# Patient Record
Sex: MALE | Race: WHITE | NOT HISPANIC OR LATINO | URBAN - METROPOLITAN AREA
[De-identification: names, ages, dates, MRNs, and addresses within clinical notes are randomized per-mention and may not be internally consistent; named-entity substitution may affect disease eponyms.]

---

## 2017-09-23 ENCOUNTER — GENERIC CONVERSION - ENCOUNTER (OUTPATIENT)
Dept: OTHER | Facility: OTHER | Age: 57
End: 2017-09-23

## 2017-10-04 ENCOUNTER — GENERIC CONVERSION - ENCOUNTER (OUTPATIENT)
Dept: OTHER | Facility: OTHER | Age: 57
End: 2017-10-04

## 2018-08-14 ENCOUNTER — OFFICE VISIT (OUTPATIENT)
Dept: FAMILY MEDICINE CLINIC | Facility: CLINIC | Age: 58
End: 2018-08-14
Payer: MEDICARE

## 2018-08-14 VITALS
SYSTOLIC BLOOD PRESSURE: 124 MMHG | HEIGHT: 69 IN | DIASTOLIC BLOOD PRESSURE: 82 MMHG | WEIGHT: 231 LBS | BODY MASS INDEX: 34.21 KG/M2 | HEART RATE: 68 BPM | TEMPERATURE: 98.5 F

## 2018-08-14 DIAGNOSIS — F20.9 SCHIZOPHRENIA, UNSPECIFIED TYPE (HCC): ICD-10-CM

## 2018-08-14 DIAGNOSIS — E78.2 MIXED HYPERLIPIDEMIA: ICD-10-CM

## 2018-08-14 DIAGNOSIS — N40.0 BENIGN PROSTATIC HYPERPLASIA, UNSPECIFIED WHETHER LOWER URINARY TRACT SYMPTOMS PRESENT: ICD-10-CM

## 2018-08-14 DIAGNOSIS — Z00.00 HEALTH MAINTENANCE EXAMINATION: Primary | ICD-10-CM

## 2018-08-14 DIAGNOSIS — Z12.11 SCREENING FOR COLON CANCER: ICD-10-CM

## 2018-08-14 DIAGNOSIS — G20 PARKINSONISM, UNSPECIFIED PARKINSONISM TYPE (HCC): ICD-10-CM

## 2018-08-14 DIAGNOSIS — Z12.5 SCREENING FOR PROSTATE CANCER: ICD-10-CM

## 2018-08-14 DIAGNOSIS — I10 ESSENTIAL HYPERTENSION: ICD-10-CM

## 2018-08-14 DIAGNOSIS — I48.21 PERMANENT ATRIAL FIBRILLATION (HCC): Primary | ICD-10-CM

## 2018-08-14 PROCEDURE — G0438 PPPS, INITIAL VISIT: HCPCS | Performed by: FAMILY MEDICINE

## 2018-08-14 RX ORDER — DIVALPROEX SODIUM 500 MG/1
500 TABLET, DELAYED RELEASE ORAL 2 TIMES DAILY
COMMUNITY

## 2018-08-14 RX ORDER — METOPROLOL TARTRATE 50 MG/1
1 TABLET, FILM COATED ORAL 3 TIMES DAILY
COMMUNITY
End: 2018-08-14 | Stop reason: DRUGHIGH

## 2018-08-14 RX ORDER — DIVALPROEX SODIUM 250 MG/1
250 TABLET, DELAYED RELEASE ORAL EVERY OTHER DAY
COMMUNITY

## 2018-08-14 RX ORDER — OLANZAPINE 10 MG/1
10 TABLET ORAL EVERY EVENING
COMMUNITY

## 2018-08-14 RX ORDER — GABAPENTIN 600 MG/1
1500 TABLET ORAL 2 TIMES DAILY
COMMUNITY
Start: 2014-11-26

## 2018-08-14 RX ORDER — FUROSEMIDE 20 MG/1
2 TABLET ORAL DAILY
COMMUNITY
End: 2021-08-10 | Stop reason: DRUGHIGH

## 2018-08-14 RX ORDER — METOPROLOL SUCCINATE 50 MG/1
50 TABLET, EXTENDED RELEASE ORAL 2 TIMES DAILY
COMMUNITY

## 2018-08-14 NOTE — PROGRESS NOTES
Assessment/Plan:  Health maintenance-the patient will go for the lab work and will follow up with the results when available  He will continue with his healthy diet and exercise  He will continue with his current medications  We will see him back in the office as scheduled  Diagnoses and all orders for this visit:    Health maintenance examination    Screening for colon cancer  -     Ambulatory Referral to GI Endoscopy; Future    Other orders  -     aspirin 81 MG tablet; Take 1 tablet by mouth daily  -     divalproex sodium (DEPAKOTE) 500 mg EC tablet; Take 500 mg by mouth 2 (two) times a day    -     gabapentin (NEURONTIN) 300 mg capsule; Take 600 mg by mouth 2 (two) times a day    -     furosemide (LASIX) 20 mg tablet; Take 3 tablets by mouth daily    -     Discontinue: metoprolol tartrate (LOPRESSOR) 50 mg tablet; Take 1 tablet by mouth 3 (three) times a day  -     OLANZapine (ZYPREXA) 10 mg tablet; Take 3 tablets by mouth every evening    -     divalproex sodium (DEPAKOTE) 250 mg EC tablet; Take 250 mg by mouth every other day  -     metoprolol succinate (TOPROL-XL) 50 mg 24 hr tablet; Take 50 mg by mouth 2 (two) times a day       Return in about 6 months (around 2/14/2019) for Recheck  Subjective:   Chief Complaint   Patient presents with    Physical Exam     Complete Physical 62 y/o male -- knows needs colonoscopy        Patient ID: Ajay Monteiro is a 61 y o  male presents today for a medicare wellness visit  Ajay Monteiro is a 61 y o  Male who presents for a complete physical    He is seeing the psychiatrist Dr Conchita Montano  He sees his cardiologist in [place]  There is occasional shortness of breath- he did address this with his cardiologist with heavy exertion  There is no severe dyspnea  He has not had a colonoscopy yet  He is interested in one  There are no GI or  problems  There is some straining with his bowel movements and he gets constipation at times      He is trying to drink water regularly  He is staying active  The following portions of the patient's history were reviewed and updated as appropriate: allergies, current medications, past family history, past medical history, past social history, past surgical history and problem list   Patient Active Problem List   Diagnosis    Atrial fibrillation ([facility] )    Benign prostatic hyperplasia    Cataract of both eyes    Hyperlipidemia    Hypertension    Obesity    Obstructive sleep apnea    Osteoarthritis    [facility]    Schizophrenia ([facility] )    Stasis dermatitis    Venous insufficiency     History reviewed  No pertinent past medical history  Past Surgical History:   Procedure Laterality Date    TOTAL HIP ARTHROPLASTY Left 08/2006    TOTAL KNEE ARTHROPLASTY Right 09/17/2011     Allergies   Allergen Reactions    Haloperidol      Family History   Problem Relation Age of Onset    Hypertension Mother     Stroke Family         syndrome      Social History     Social History    Marital status: /Civil Union     Spouse name: N/A    Number of children: N/A    Years of education: N/A     Occupational History    Not on file  Social History Main Topics    Smoking status: Never Smoker    Smokeless tobacco: Never Used    Alcohol use No    Drug use: No    Sexual activity: Not on file     Other Topics Concern    Not on file     Social History Narrative    Physical disability      No current outpatient prescriptions on file prior to visit  No current facility-administered medications on file prior to visit  Callum Scales is here for his Initial Wellness visit  Health Risk Assessment:  Patient rates overall health as good  Patient feels that their physical health rating is Same  Eyesight was rated as Same  Hearing was rated as Same  Patient feels that their emotional and mental health rating is Same  Pain experienced by patient in the last 7 days has been None       Emotional/Mental Health:  Patient has been feeling nervous/anxious  PHQ-9 Depression Screening:    Frequency of the following problems over the past two weeks:      1  Little interest or pleasure in doing things: 0 - not at all      2  Feeling down, depressed, or hopeless: 0 - not at all  PHQ-2 Score: 0          Broken Bones/Falls: Fall Risk Assessment:    In the past year, patient has experienced: No history of falling in past year          Bladder/Bowel:  Patient has not leaked urine accidently in the last six months  Patient reports no loss of bowel control  Immunizations:  Patient has had a flu vaccination within the last year  Patient has received a pneumonia shot  Patient has not received a shingles shot  Patient has received tetanus/diphtheria shot  Home Safety:  Patient does not have trouble with stairs inside or outside of their home  Patient currently reports that there are no safety hazards present in home, working smoke alarms,     Preventative Screenings:   prostate cancer screen performed, colon cancer screen completed, cholesterol screen completed, glaucoma eye exam completed,     Nutrition:  Current diet: Regular and Limited junk food with servings of the following:    Medications:  Patient is not currently taking any over-the-counter supplements  Patient is able to manage medications  Lifestyle Choices:  Patient reports no tobacco use  Patient has not smoked or used tobacco in the past   Patient reports no alcohol use  Patient drives a vehicle  Patient wears seat belt  Current level of exercise of physical activity described by patient as: walking daily          Activities of Daily Living:  Can get out of bed by his or her self, able to dress self, able to make own meals, able to do own shopping, able to bathe self, can do own laundry/housekeeping, can manage own money, pay bills and track expenses    Previous Hospitalizations:  No hospitalization or ED visit in past 12 months        Advanced Directives:  Patient has not decided on power of   Patient has not completed advanced directive  Review of Systems   Constitutional: Negative  HENT: Negative  Eyes: Negative  Respiratory: Negative  Cardiovascular: Negative  Gastrointestinal: Negative  Negative for bowel incontinence  Endocrine: Negative  Genitourinary: Negative  Musculoskeletal: Negative  Skin: Negative  Allergic/Immunologic: Negative  Neurological: Negative  Hematological: Negative  Psychiatric/Behavioral: The patient is nervous/anxious  Objective:  Vitals:    08/14/18 1506 08/14/18 1601   BP: 152/84 124/82   BP Location: Right arm    Patient Position: Sitting    Cuff Size: Standard    Pulse: 78 68   Temp: 98 5 °F (36 9 °C)    TempSrc: Tympanic    Weight: 105 kg (231 lb)    Height: 5' 9 3" (1 76 m)      Body mass index is 33 82 kg/m²  Wt Readings from Last 3 Encounters:   08/14/18 105 kg (231 lb)   05/13/14 105 kg (231 lb 8 oz)   11/12/13 109 kg (239 lb 8 oz)     Temp Readings from Last 3 Encounters:   08/14/18 98 5 °F (36 9 °C) (Tympanic)   07/18/12 98 6 °F (37 °C)     BP Readings from Last 3 Encounters:   08/14/18 124/82   05/13/14 120/80   11/12/13 120/80     Pulse Readings from Last 3 Encounters:   08/14/18 68   05/13/14 66   11/12/13 60        Physical Exam   Constitutional: He is oriented to person, place, and time  He appears well-developed and well-nourished  No distress  Eyes: EOM are normal  Pupils are equal, round, and reactive to light  Neck: Normal range of motion  Neck supple  No JVD present  No tracheal deviation present  No thyromegaly present  Cardiovascular: Normal rate, regular rhythm and normal heart sounds  Exam reveals no gallop and no friction rub  No murmur heard  Pulmonary/Chest: Effort normal and breath sounds normal  No stridor  No respiratory distress  He has no wheezes  He has no rales  He exhibits no tenderness  Abdominal: Soft  Bowel sounds are normal  He exhibits no distension and no mass  There is no tenderness  There is no rebound and no guarding  Musculoskeletal: Normal range of motion  Lymphadenopathy:     He has no cervical adenopathy  Neurological: He is alert and oriented to person, place, and time  He has normal reflexes  No cranial nerve deficit  He exhibits normal muscle tone  Coordination normal    Skin: He is not diaphoretic  Nursing note and vitals reviewed  No visits with results within 2 Month(s) from this visit     Latest known visit with results is:   Conversion Encounter Outpatient on 03/14/2015   Component Date Value    VALPROIC ACID TOTAL 03/14/2015 97     GLUCOSE FASTING 03/14/2015 92     Triglycerides 03/14/2015 168     Cholesterol 03/14/2015 157     HDL 03/14/2015 40     LDL Calculated 03/14/2015 83     Total Bilirubin 03/14/2015 0 53     Total Protein 03/14/2015 7 5     Alkaline Phosphatase 03/14/2015 53     ALT 03/14/2015 50     AST 03/14/2015 34     BILIRUBIN DIRECT 03/14/2015 0 15     Albumin 03/14/2015 3 9

## 2018-08-15 ENCOUNTER — TELEPHONE (OUTPATIENT)
Dept: FAMILY MEDICINE CLINIC | Facility: CLINIC | Age: 58
End: 2018-08-15

## 2018-08-15 DIAGNOSIS — F20.9 CHRONIC SCHIZOPHRENIA (HCC): Primary | ICD-10-CM

## 2018-08-16 ENCOUNTER — APPOINTMENT (OUTPATIENT)
Dept: LAB | Facility: CLINIC | Age: 58
End: 2018-08-16
Payer: MEDICARE

## 2018-08-16 DIAGNOSIS — Z12.5 SCREENING FOR PROSTATE CANCER: ICD-10-CM

## 2018-08-16 DIAGNOSIS — I10 ESSENTIAL HYPERTENSION: ICD-10-CM

## 2018-08-16 DIAGNOSIS — F20.9 SCHIZOPHRENIA, UNSPECIFIED TYPE (HCC): ICD-10-CM

## 2018-08-16 DIAGNOSIS — E78.2 MIXED HYPERLIPIDEMIA: ICD-10-CM

## 2018-08-16 DIAGNOSIS — N40.0 BENIGN PROSTATIC HYPERPLASIA, UNSPECIFIED WHETHER LOWER URINARY TRACT SYMPTOMS PRESENT: ICD-10-CM

## 2018-08-16 DIAGNOSIS — I48.21 PERMANENT ATRIAL FIBRILLATION (HCC): ICD-10-CM

## 2018-08-16 DIAGNOSIS — F20.9 CHRONIC SCHIZOPHRENIA (HCC): ICD-10-CM

## 2018-08-16 DIAGNOSIS — G20 PARKINSONISM, UNSPECIFIED PARKINSONISM TYPE (HCC): ICD-10-CM

## 2018-08-16 LAB
ALBUMIN SERPL BCP-MCNC: 3.9 G/DL (ref 3.5–5)
ALP SERPL-CCNC: 55 U/L (ref 46–116)
ALT SERPL W P-5'-P-CCNC: 48 U/L (ref 12–78)
ANION GAP SERPL CALCULATED.3IONS-SCNC: 9 MMOL/L (ref 4–13)
AST SERPL W P-5'-P-CCNC: 36 U/L (ref 5–45)
BASOPHILS # BLD AUTO: 0.05 THOUSANDS/ΜL (ref 0–0.1)
BASOPHILS NFR BLD AUTO: 1 % (ref 0–1)
BILIRUB SERPL-MCNC: 0.89 MG/DL (ref 0.2–1)
BILIRUB UR QL STRIP: NEGATIVE
BUN SERPL-MCNC: 14 MG/DL (ref 5–25)
CALCIUM SERPL-MCNC: 9 MG/DL (ref 8.3–10.1)
CHLORIDE SERPL-SCNC: 103 MMOL/L (ref 100–108)
CHOLEST SERPL-MCNC: 156 MG/DL (ref 50–200)
CLARITY UR: CLEAR
CO2 SERPL-SCNC: 28 MMOL/L (ref 21–32)
COLOR UR: YELLOW
CREAT SERPL-MCNC: 1.22 MG/DL (ref 0.6–1.3)
EOSINOPHIL # BLD AUTO: 0.24 THOUSAND/ΜL (ref 0–0.61)
EOSINOPHIL NFR BLD AUTO: 4 % (ref 0–6)
ERYTHROCYTE [DISTWIDTH] IN BLOOD BY AUTOMATED COUNT: 12.8 % (ref 11.6–15.1)
GFR SERPL CREATININE-BSD FRML MDRD: 65 ML/MIN/1.73SQ M
GLUCOSE P FAST SERPL-MCNC: 90 MG/DL (ref 65–99)
GLUCOSE UR STRIP-MCNC: NEGATIVE MG/DL
HCT VFR BLD AUTO: 41.6 % (ref 36.5–49.3)
HDLC SERPL-MCNC: 38 MG/DL (ref 40–60)
HGB BLD-MCNC: 13.7 G/DL (ref 12–17)
HGB UR QL STRIP.AUTO: NEGATIVE
IMM GRANULOCYTES # BLD AUTO: 0.02 THOUSAND/UL (ref 0–0.2)
IMM GRANULOCYTES NFR BLD AUTO: 0 % (ref 0–2)
KETONES UR STRIP-MCNC: NEGATIVE MG/DL
LDLC SERPL CALC-MCNC: 81 MG/DL (ref 0–100)
LDLC SERPL DIRECT ASSAY-MCNC: 100 MG/DL (ref 0–100)
LEUKOCYTE ESTERASE UR QL STRIP: NEGATIVE
LYMPHOCYTES # BLD AUTO: 1.92 THOUSANDS/ΜL (ref 0.6–4.47)
LYMPHOCYTES NFR BLD AUTO: 34 % (ref 14–44)
MCH RBC QN AUTO: 29.5 PG (ref 26.8–34.3)
MCHC RBC AUTO-ENTMCNC: 32.9 G/DL (ref 31.4–37.4)
MCV RBC AUTO: 90 FL (ref 82–98)
MONOCYTES # BLD AUTO: 0.62 THOUSAND/ΜL (ref 0.17–1.22)
MONOCYTES NFR BLD AUTO: 11 % (ref 4–12)
NEUTROPHILS # BLD AUTO: 2.88 THOUSANDS/ΜL (ref 1.85–7.62)
NEUTS SEG NFR BLD AUTO: 50 % (ref 43–75)
NITRITE UR QL STRIP: NEGATIVE
NONHDLC SERPL-MCNC: 118 MG/DL
NRBC BLD AUTO-RTO: 0 /100 WBCS
PH UR STRIP.AUTO: 6.5 [PH] (ref 4.5–8)
PLATELET # BLD AUTO: 223 THOUSANDS/UL (ref 149–390)
PMV BLD AUTO: 9.9 FL (ref 8.9–12.7)
POTASSIUM SERPL-SCNC: 3.9 MMOL/L (ref 3.5–5.3)
PROT SERPL-MCNC: 7.5 G/DL (ref 6.4–8.2)
PROT UR STRIP-MCNC: NEGATIVE MG/DL
RBC # BLD AUTO: 4.65 MILLION/UL (ref 3.88–5.62)
SODIUM SERPL-SCNC: 140 MMOL/L (ref 136–145)
SP GR UR STRIP.AUTO: 1.01 (ref 1–1.03)
T4 FREE SERPL-MCNC: 0.92 NG/DL (ref 0.76–1.46)
TRIGL SERPL-MCNC: 186 MG/DL
TSH SERPL DL<=0.05 MIU/L-ACNC: 6.08 UIU/ML (ref 0.36–3.74)
UROBILINOGEN UR QL STRIP.AUTO: 0.2 E.U./DL
VALPROATE SERPL-MCNC: 97 UG/ML (ref 50–100)
WBC # BLD AUTO: 5.73 THOUSAND/UL (ref 4.31–10.16)

## 2018-08-16 PROCEDURE — 83721 ASSAY OF BLOOD LIPOPROTEIN: CPT

## 2018-08-16 PROCEDURE — 80053 COMPREHEN METABOLIC PANEL: CPT

## 2018-08-16 PROCEDURE — 36415 COLL VENOUS BLD VENIPUNCTURE: CPT

## 2018-08-16 PROCEDURE — 84153 ASSAY OF PSA TOTAL: CPT

## 2018-08-16 PROCEDURE — 84439 ASSAY OF FREE THYROXINE: CPT

## 2018-08-16 PROCEDURE — 84443 ASSAY THYROID STIM HORMONE: CPT

## 2018-08-16 PROCEDURE — 80061 LIPID PANEL: CPT

## 2018-08-16 PROCEDURE — 81003 URINALYSIS AUTO W/O SCOPE: CPT

## 2018-08-16 PROCEDURE — 80164 ASSAY DIPROPYLACETIC ACD TOT: CPT

## 2018-08-16 PROCEDURE — 85025 COMPLETE CBC W/AUTO DIFF WBC: CPT

## 2018-08-17 DIAGNOSIS — R79.89 ABNORMAL THYROID BLOOD TEST: Primary | ICD-10-CM

## 2018-08-17 LAB — PSA SERPL DL<=0.01 NG/ML-MCNC: 2.05 NG/ML (ref 0–4)

## 2018-08-17 NOTE — PROGRESS NOTES
8/17/18 called and spoke in detail to patient about labs and to watch his sweet and carb intake   Also told patient we would like to repeat his tsh in 3 months

## 2018-08-17 NOTE — PROGRESS NOTES
8/17/18 called and tried to talk to patient   Patient was unavailable at this time will try again later

## 2018-08-22 ENCOUNTER — TELEPHONE (OUTPATIENT)
Dept: FAMILY MEDICINE CLINIC | Facility: CLINIC | Age: 58
End: 2018-08-22

## 2018-11-09 ENCOUNTER — TELEPHONE (OUTPATIENT)
Dept: FAMILY MEDICINE CLINIC | Facility: CLINIC | Age: 58
End: 2018-11-09

## 2018-11-09 DIAGNOSIS — R79.89 ELEVATED TSH: Primary | ICD-10-CM

## 2018-11-09 NOTE — TELEPHONE ENCOUNTER
Devorah Cheatham had an abnormal thyroid test 3 months ago and you wanted to repeat the test and he is asking for the paper work to have the testing done downstairs, if you would be so kind as to put it in the system he will get it done next week, thank you     [phone]

## 2018-11-13 ENCOUNTER — APPOINTMENT (OUTPATIENT)
Dept: LAB | Facility: CLINIC | Age: 58
End: 2018-11-13
Payer: MEDICARE

## 2018-11-13 DIAGNOSIS — R79.89 ELEVATED TSH: ICD-10-CM

## 2018-11-13 LAB
T4 FREE SERPL-MCNC: 0.95 NG/DL (ref 0.76–1.46)
TSH SERPL DL<=0.05 MIU/L-ACNC: 4.4 UIU/ML (ref 0.36–3.74)

## 2018-11-13 PROCEDURE — 36415 COLL VENOUS BLD VENIPUNCTURE: CPT

## 2018-11-13 PROCEDURE — 84443 ASSAY THYROID STIM HORMONE: CPT

## 2018-11-13 PROCEDURE — 84439 ASSAY OF FREE THYROXINE: CPT

## 2018-11-20 ENCOUNTER — TELEPHONE (OUTPATIENT)
Dept: FAMILY MEDICINE CLINIC | Facility: CLINIC | Age: 58
End: 2018-11-20

## 2018-11-21 NOTE — TELEPHONE ENCOUNTER
I spoke with the patient on 11/20/2018 and reviewed the results of his testing  His TSH was still mildly elevated but lower than his last value  His T4 free was in the normal range consistent with subclinical hypothyroidism  I advised him that no treatment is necessary at this time but will continue to monitor his thyroid levels every 6 months  He will continue with his current regimen and we will see him back as scheduled in March 2019 for checkup

## 2019-03-15 ENCOUNTER — OFFICE VISIT (OUTPATIENT)
Dept: FAMILY MEDICINE CLINIC | Facility: CLINIC | Age: 59
End: 2019-03-15
Payer: MEDICARE

## 2019-03-15 VITALS
HEART RATE: 67 BPM | WEIGHT: 231 LBS | DIASTOLIC BLOOD PRESSURE: 80 MMHG | BODY MASS INDEX: 34.21 KG/M2 | TEMPERATURE: 97.5 F | SYSTOLIC BLOOD PRESSURE: 138 MMHG | RESPIRATION RATE: 14 BRPM | OXYGEN SATURATION: 96 % | HEIGHT: 69 IN

## 2019-03-15 DIAGNOSIS — I48.21 PERMANENT ATRIAL FIBRILLATION (HCC): ICD-10-CM

## 2019-03-15 DIAGNOSIS — E03.8 SUBCLINICAL HYPOTHYROIDISM: Primary | ICD-10-CM

## 2019-03-15 DIAGNOSIS — Z23 NEED FOR VACCINATION: ICD-10-CM

## 2019-03-15 DIAGNOSIS — Z12.11 SCREENING FOR COLON CANCER: ICD-10-CM

## 2019-03-15 DIAGNOSIS — E66.9 OBESITY (BMI 30.0-34.9): ICD-10-CM

## 2019-03-15 DIAGNOSIS — F20.9 CHRONIC SCHIZOPHRENIA (HCC): ICD-10-CM

## 2019-03-15 DIAGNOSIS — G20 PARKINSONISM, UNSPECIFIED PARKINSONISM TYPE (HCC): ICD-10-CM

## 2019-03-15 DIAGNOSIS — E78.2 MIXED HYPERLIPIDEMIA: ICD-10-CM

## 2019-03-15 PROCEDURE — 99213 OFFICE O/P EST LOW 20 MIN: CPT | Performed by: FAMILY MEDICINE

## 2019-03-15 RX ORDER — OLANZAPINE 20 MG/1
TABLET ORAL
COMMUNITY
Start: 2018-12-21

## 2019-03-15 NOTE — PROGRESS NOTES
Assessment/Plan:  Subclinical hypothyroidism  The patient has a history of subclinical hypothyroidism-the patient will go for the follow-up lab work as ordered  We will follow up with him with the results  Hyperlipidemia  The patient will continue to work on his diet and exercise  We will see him back as scheduled  Atrial fibrillation [facility])  The patient will continue to follow up with his cardiologist as scheduled  The    Chronic schizophrenia [facility])  The patient is stable on his medication  He will continue to follow up with his psychiatrist as scheduled  Diagnoses and all orders for this visit:    Subclinical hypothyroidism  -     Comprehensive metabolic panel; Future  -     T4, free; Future  -     TSH, 3rd generation; Future    Mixed hyperlipidemia    Permanent atrial fibrillation (HCC)    Screening for colon cancer  -     Ambulatory referral to Gastroenterology; Future    [facility], unspecified [facility] )    Chronic schizophrenia ([facility] )  -     OLANZapine (ZyPREXA) 20 MG tablet; Once daily to take with a 10mg tab    Need for vaccination  -     Zoster Vac Recomb Adjuvanted ([address] [place] [address] [address]) 50 MCG/0 5ML SUSR; Inject 1 Dose into a muscle once for 1 dose    Obesity (BMI 30 0-34  9)       Return in about 5 months (around 8/15/2019) for Annual physical-physical      Subjective:   Chief Complaint   Patient presents with    Follow-up     recheck thyroid  Patient ID: Prudence Lindsey is a 60 y o  male presents today for a routine checkup  Prudence Lindsey is a 60 y o  male who presents today for follow-up of his subclinical hypothyroidism, hyperlipidemia, and other chronic medical conditions  He continues to see a psychiatrist regularly for management of his schizophrenia  He has a continues to see his cardiologist [facility] for management of his history atrial fibrillation S/P ablation  He does get some mild shortness of breath with exertion  There is non chest pain    There are no palpitations  There is no edema  He is seeing his cardiologist in May 2019 for follow up  There is no fatigue  There is some tiredness with the Zyprexa if he takes it too early  The patient denies any chest pain, shortness of breath, or palpitations  There is no edema  There are no headaches or visual changes  There is no lightheadedness, dizziness, or fainting spells  The patient currently denies any nausea, vomiting, or GERD symptoms  he has normal bowel movements and normal urine output  he has a normal appetite  The following portions of the patient's history were reviewed and updated as appropriate: allergies, current medications, past family history, past medical history, past social history, past surgical history and problem list   Patient Active Problem List   Diagnosis    Atrial fibrillation ([facility] )    Benign prostatic hyperplasia    Cataract of both eyes    Hyperlipidemia    Hypertension    Obesity    Obstructive sleep apnea    Osteoarthritis    Parkinsonism ([facility] )    Chronic schizophrenia ([facility] )    Stasis dermatitis    Venous insufficiency    Subclinical hypothyroidism     No past medical history on file    Past Surgical History:   Procedure Laterality Date    TOTAL HIP ARTHROPLASTY Left 08/2006    TOTAL KNEE ARTHROPLASTY Right 09/17/2011     Allergies   Allergen Reactions    Haloperidol      Family History   Problem Relation Age of Onset    Hypertension Mother     Stroke Family         syndrome      Social History     Socioeconomic History    Marital status: /Civil Union     Spouse name: Not on file    Number of children: Not on file    Years of education: Not on file    Highest education level: Not on file   Occupational History    Not on file   Social Needs    Financial resource strain: Not on file    Food insecurity:     Worry: Not on file     Inability: Not on file    Transportation needs:     Medical: Not on file     Non-medical: Not on file   Tobacco Use    Smoking status: Never Smoker    Smokeless tobacco: Never Used   Substance and Sexual Activity    Alcohol use: No    Drug use: No    Sexual activity: Not on file   Lifestyle    Physical activity:     Days per week: Not on file     Minutes per session: Not on file    Stress: Not on file   Relationships    Social connections:     Talks on phone: Not on file     Gets together: Not on file     Attends Confucianism service: Not on file     Active member of club or organization: Not on file     Attends meetings of clubs or organizations: Not on file     Relationship status: Not on file    Intimate partner violence:     Fear of current or ex partner: Not on file     Emotionally abused: Not on file     Physically abused: Not on file     Forced sexual activity: Not on file   Other Topics Concern    Not on file   Social History Narrative    Physical disability      Current Outpatient Medications on File Prior to Visit   Medication Sig Dispense Refill    aspirin 81 MG tablet Take 1 tablet by mouth daily      divalproex sodium (DEPAKOTE) 250 mg EC tablet Take 250 mg by mouth every other day      divalproex sodium (DEPAKOTE) 500 mg EC tablet Take 500 mg by mouth 2 (two) times a day        furosemide (LASIX) 20 mg tablet Take 2 tablets by mouth daily       gabapentin (NEURONTIN) 600 MG tablet Take 600 mg by mouth 2 (two) times a day        metoprolol succinate (TOPROL-XL) 50 mg 24 hr tablet Take 50 mg by mouth 2 (two) times a day      OLANZapine (ZYPREXA) 10 mg tablet Take 10 mg by mouth every evening       OLANZapine (ZyPREXA) 20 MG tablet Once daily to take with a 10mg tab       No current facility-administered medications on file prior to visit  Review of Systems   Constitutional: Negative  HENT: Negative  Eyes: Negative  Respiratory: Negative  Cardiovascular: Negative  Gastrointestinal: Negative  Endocrine: Negative  Genitourinary: Negative  Musculoskeletal: Negative      Skin: Negative  Allergic/Immunologic: Negative  Neurological: Negative  Hematological: Negative  Psychiatric/Behavioral: Negative  Objective:  Vitals:    03/15/19 1337   BP: 138/80   BP Location: Left arm   Patient Position: Sitting   Cuff Size: Standard   Pulse: 67   Resp: 14   Temp: 97 5 °F (36 4 °C)   TempSrc: Tympanic   SpO2: 96%   Weight: 105 kg (231 lb)   Height: 5' 9 3" (1 76 m)     Body mass index is 33 82 kg/m²  Wt Readings from Last 3 Encounters:   03/15/19 105 kg (231 lb)   08/14/18 105 kg (231 lb)   05/13/14 105 kg (231 lb 8 oz)     Temp Readings from Last 3 Encounters:   03/15/19 97 5 °F (36 4 °C) (Tympanic)   08/14/18 98 5 °F (36 9 °C) (Tympanic)   07/18/12 98 6 °F (37 °C)     BP Readings from Last 3 Encounters:   03/15/19 138/80   08/14/18 124/82   05/13/14 120/80     Pulse Readings from Last 3 Encounters:   03/15/19 67   08/14/18 68   05/13/14 66        Physical Exam   Constitutional: He is oriented to person, place, and time  He appears well-developed and well-nourished  No distress  Eyes: Pupils are equal, round, and reactive to light  EOM are normal    Neck: Normal range of motion  Neck supple  No JVD present  No tracheal deviation present  No thyromegaly present  Cardiovascular: Normal rate, regular rhythm and normal heart sounds  Exam reveals no gallop and no friction rub  No murmur heard  Pulmonary/Chest: Effort normal and breath sounds normal  No stridor  No respiratory distress  He has no wheezes  He has no rales  He exhibits no tenderness  Abdominal: Soft  Bowel sounds are normal  He exhibits no distension and no mass  There is no tenderness  There is no rebound and no guarding  Musculoskeletal: Normal range of motion  Lymphadenopathy:     He has no cervical adenopathy  Neurological: He is alert and oriented to person, place, and time  He has normal reflexes  He displays normal reflexes  No cranial nerve deficit  He exhibits normal muscle tone   Coordination normal    Skin: Skin is warm and dry  No rash noted  He is not diaphoretic  No erythema  No pallor  Nursing note and vitals reviewed  No visits with results within 2 Month(s) from this visit  Latest known visit with results is:   Appointment on 11/13/2018   Component Date Value    Free T4 11/13/2018 0 95     TSH 3RD GENERATON 11/13/2018 4 400*      BMI Counseling: Body mass index is 33 82 kg/m²  Discussed the patient's BMI with him  The BMI is above average  BMI counseling and education was provided to the patient  Nutrition recommendations include reducing portion sizes, decreasing overall calorie intake, 3-5 servings of fruits/vegetables daily, reducing fast food intake, moderation in carbohydrate intake, increasing intake of lean protein, reducing intake of saturated fat and trans fat and reducing intake of cholesterol  Exercise recommendations include exercising 3-5 times per week and strength training exercises

## 2019-03-17 NOTE — ASSESSMENT & PLAN NOTE
The patient has a history of subclinical hypothyroidism-the patient will go for the follow-up lab work as ordered  We will follow up with him with the results

## 2019-03-17 NOTE — ASSESSMENT & PLAN NOTE
The patient is stable on his medication  He will continue to follow up with his psychiatrist as scheduled

## 2019-03-18 ENCOUNTER — APPOINTMENT (OUTPATIENT)
Dept: LAB | Facility: CLINIC | Age: 59
End: 2019-03-18
Payer: MEDICARE

## 2019-03-18 DIAGNOSIS — E03.8 SUBCLINICAL HYPOTHYROIDISM: ICD-10-CM

## 2019-03-18 LAB
ALBUMIN SERPL BCP-MCNC: 3.8 G/DL (ref 3.5–5)
ALP SERPL-CCNC: 51 U/L (ref 46–116)
ALT SERPL W P-5'-P-CCNC: 31 U/L (ref 12–78)
ANION GAP SERPL CALCULATED.3IONS-SCNC: 4 MMOL/L (ref 4–13)
AST SERPL W P-5'-P-CCNC: 23 U/L (ref 5–45)
BILIRUB SERPL-MCNC: 0.55 MG/DL (ref 0.2–1)
BUN SERPL-MCNC: 18 MG/DL (ref 5–25)
CALCIUM SERPL-MCNC: 8.6 MG/DL (ref 8.3–10.1)
CHLORIDE SERPL-SCNC: 106 MMOL/L (ref 100–108)
CO2 SERPL-SCNC: 29 MMOL/L (ref 21–32)
CREAT SERPL-MCNC: 1.09 MG/DL (ref 0.6–1.3)
GFR SERPL CREATININE-BSD FRML MDRD: 74 ML/MIN/1.73SQ M
GLUCOSE SERPL-MCNC: 95 MG/DL (ref 65–140)
POTASSIUM SERPL-SCNC: 4.2 MMOL/L (ref 3.5–5.3)
PROT SERPL-MCNC: 7 G/DL (ref 6.4–8.2)
SODIUM SERPL-SCNC: 139 MMOL/L (ref 136–145)
T4 FREE SERPL-MCNC: 0.89 NG/DL (ref 0.76–1.46)
TSH SERPL DL<=0.05 MIU/L-ACNC: 5.34 UIU/ML (ref 0.36–3.74)

## 2019-03-18 PROCEDURE — 84439 ASSAY OF FREE THYROXINE: CPT

## 2019-03-18 PROCEDURE — 36415 COLL VENOUS BLD VENIPUNCTURE: CPT

## 2019-03-18 PROCEDURE — 80053 COMPREHEN METABOLIC PANEL: CPT

## 2019-03-18 PROCEDURE — 84443 ASSAY THYROID STIM HORMONE: CPT

## 2019-06-05 DIAGNOSIS — I48.21 PERMANENT ATRIAL FIBRILLATION (HCC): ICD-10-CM

## 2019-06-05 DIAGNOSIS — E66.9 OBESITY, UNSPECIFIED CLASSIFICATION, UNSPECIFIED OBESITY TYPE, UNSPECIFIED WHETHER SERIOUS COMORBIDITY PRESENT: ICD-10-CM

## 2019-06-05 DIAGNOSIS — E78.2 MIXED HYPERLIPIDEMIA: ICD-10-CM

## 2019-06-05 DIAGNOSIS — Z12.5 SCREENING FOR PROSTATE CANCER: ICD-10-CM

## 2019-06-05 DIAGNOSIS — Z11.4 SCREENING FOR HIV (HUMAN IMMUNODEFICIENCY VIRUS): ICD-10-CM

## 2019-06-05 DIAGNOSIS — Z00.00 HEALTHCARE MAINTENANCE: Primary | ICD-10-CM

## 2019-06-05 DIAGNOSIS — I10 ESSENTIAL HYPERTENSION: ICD-10-CM

## 2019-08-12 ENCOUNTER — APPOINTMENT (OUTPATIENT)
Dept: LAB | Facility: CLINIC | Age: 59
End: 2019-08-12
Payer: MEDICARE

## 2019-08-12 DIAGNOSIS — E66.9 OBESITY, UNSPECIFIED CLASSIFICATION, UNSPECIFIED OBESITY TYPE, UNSPECIFIED WHETHER SERIOUS COMORBIDITY PRESENT: ICD-10-CM

## 2019-08-12 DIAGNOSIS — I10 ESSENTIAL HYPERTENSION: ICD-10-CM

## 2019-08-12 DIAGNOSIS — Z12.5 SCREENING FOR PROSTATE CANCER: ICD-10-CM

## 2019-08-12 DIAGNOSIS — Z00.00 HEALTHCARE MAINTENANCE: ICD-10-CM

## 2019-08-12 DIAGNOSIS — E78.2 MIXED HYPERLIPIDEMIA: ICD-10-CM

## 2019-08-12 DIAGNOSIS — Z11.4 SCREENING FOR HIV (HUMAN IMMUNODEFICIENCY VIRUS): ICD-10-CM

## 2019-08-12 DIAGNOSIS — I48.21 PERMANENT ATRIAL FIBRILLATION (HCC): ICD-10-CM

## 2019-08-12 LAB
ALBUMIN SERPL BCP-MCNC: 3.8 G/DL (ref 3.5–5)
ALP SERPL-CCNC: 57 U/L (ref 46–116)
ALT SERPL W P-5'-P-CCNC: 42 U/L (ref 12–78)
ANION GAP SERPL CALCULATED.3IONS-SCNC: 7 MMOL/L (ref 4–13)
AST SERPL W P-5'-P-CCNC: 30 U/L (ref 5–45)
BACTERIA UR QL AUTO: ABNORMAL /HPF
BASOPHILS # BLD AUTO: 0.09 THOUSANDS/ΜL (ref 0–0.1)
BASOPHILS NFR BLD AUTO: 1 % (ref 0–1)
BILIRUB SERPL-MCNC: 0.74 MG/DL (ref 0.2–1)
BILIRUB UR QL STRIP: NEGATIVE
BUN SERPL-MCNC: 15 MG/DL (ref 5–25)
CALCIUM SERPL-MCNC: 8.8 MG/DL (ref 8.3–10.1)
CHLORIDE SERPL-SCNC: 102 MMOL/L (ref 100–108)
CHOLEST SERPL-MCNC: 155 MG/DL (ref 50–200)
CLARITY UR: CLEAR
CO2 SERPL-SCNC: 30 MMOL/L (ref 21–32)
COLOR UR: ABNORMAL
CREAT SERPL-MCNC: 1.23 MG/DL (ref 0.6–1.3)
EOSINOPHIL # BLD AUTO: 0.28 THOUSAND/ΜL (ref 0–0.61)
EOSINOPHIL NFR BLD AUTO: 4 % (ref 0–6)
ERYTHROCYTE [DISTWIDTH] IN BLOOD BY AUTOMATED COUNT: 12.9 % (ref 11.6–15.1)
GFR SERPL CREATININE-BSD FRML MDRD: 64 ML/MIN/1.73SQ M
GLUCOSE P FAST SERPL-MCNC: 95 MG/DL (ref 65–99)
GLUCOSE UR STRIP-MCNC: NEGATIVE MG/DL
HCT VFR BLD AUTO: 43.1 % (ref 36.5–49.3)
HDLC SERPL-MCNC: 34 MG/DL (ref 40–60)
HGB BLD-MCNC: 13.9 G/DL (ref 12–17)
HGB UR QL STRIP.AUTO: NEGATIVE
HYALINE CASTS #/AREA URNS LPF: ABNORMAL /LPF
IMM GRANULOCYTES # BLD AUTO: 0.03 THOUSAND/UL (ref 0–0.2)
IMM GRANULOCYTES NFR BLD AUTO: 0 % (ref 0–2)
KETONES UR STRIP-MCNC: NEGATIVE MG/DL
LDLC SERPL CALC-MCNC: 77 MG/DL (ref 0–100)
LEUKOCYTE ESTERASE UR QL STRIP: NEGATIVE
LYMPHOCYTES # BLD AUTO: 2.74 THOUSANDS/ΜL (ref 0.6–4.47)
LYMPHOCYTES NFR BLD AUTO: 37 % (ref 14–44)
MCH RBC QN AUTO: 28.9 PG (ref 26.8–34.3)
MCHC RBC AUTO-ENTMCNC: 32.3 G/DL (ref 31.4–37.4)
MCV RBC AUTO: 90 FL (ref 82–98)
MONOCYTES # BLD AUTO: 0.69 THOUSAND/ΜL (ref 0.17–1.22)
MONOCYTES NFR BLD AUTO: 9 % (ref 4–12)
NEUTROPHILS # BLD AUTO: 3.64 THOUSANDS/ΜL (ref 1.85–7.62)
NEUTS SEG NFR BLD AUTO: 49 % (ref 43–75)
NITRITE UR QL STRIP: NEGATIVE
NON-SQ EPI CELLS URNS QL MICRO: ABNORMAL /HPF
NRBC BLD AUTO-RTO: 0 /100 WBCS
PH UR STRIP.AUTO: 6.5 [PH]
PLATELET # BLD AUTO: 235 THOUSANDS/UL (ref 149–390)
PMV BLD AUTO: 10 FL (ref 8.9–12.7)
POTASSIUM SERPL-SCNC: 4 MMOL/L (ref 3.5–5.3)
PROT SERPL-MCNC: 7.3 G/DL (ref 6.4–8.2)
PROT UR STRIP-MCNC: ABNORMAL MG/DL
RBC # BLD AUTO: 4.81 MILLION/UL (ref 3.88–5.62)
RBC #/AREA URNS AUTO: ABNORMAL /HPF
SODIUM SERPL-SCNC: 139 MMOL/L (ref 136–145)
SP GR UR STRIP.AUTO: 1.02 (ref 1–1.03)
T4 FREE SERPL-MCNC: 0.87 NG/DL (ref 0.76–1.46)
TRIGL SERPL-MCNC: 222 MG/DL
TSH SERPL DL<=0.05 MIU/L-ACNC: 4.11 UIU/ML (ref 0.36–3.74)
UROBILINOGEN UR QL STRIP.AUTO: 0.2 E.U./DL
WBC # BLD AUTO: 7.47 THOUSAND/UL (ref 4.31–10.16)
WBC #/AREA URNS AUTO: ABNORMAL /HPF

## 2019-08-12 PROCEDURE — 80053 COMPREHEN METABOLIC PANEL: CPT

## 2019-08-12 PROCEDURE — G0103 PSA SCREENING: HCPCS

## 2019-08-12 PROCEDURE — 85025 COMPLETE CBC W/AUTO DIFF WBC: CPT

## 2019-08-12 PROCEDURE — 80061 LIPID PANEL: CPT

## 2019-08-12 PROCEDURE — 36415 COLL VENOUS BLD VENIPUNCTURE: CPT

## 2019-08-12 PROCEDURE — 84443 ASSAY THYROID STIM HORMONE: CPT

## 2019-08-12 PROCEDURE — 87389 HIV-1 AG W/HIV-1&-2 AB AG IA: CPT

## 2019-08-12 PROCEDURE — 84439 ASSAY OF FREE THYROXINE: CPT

## 2019-08-12 PROCEDURE — 81001 URINALYSIS AUTO W/SCOPE: CPT

## 2019-08-13 LAB
PSA FREE MFR SERPL: 21.8 %
PSA FREE SERPL-MCNC: 0.24 NG/ML
PSA SERPL-MCNC: 1.1 NG/ML (ref 0–4)

## 2019-08-14 LAB — HIV 1+2 AB+HIV1 P24 AG SERPL QL IA: NORMAL

## 2019-08-22 ENCOUNTER — OFFICE VISIT (OUTPATIENT)
Dept: FAMILY MEDICINE CLINIC | Facility: CLINIC | Age: 59
End: 2019-08-22
Payer: MEDICARE

## 2019-08-22 VITALS
SYSTOLIC BLOOD PRESSURE: 124 MMHG | TEMPERATURE: 97.3 F | BODY MASS INDEX: 34.51 KG/M2 | HEIGHT: 69 IN | DIASTOLIC BLOOD PRESSURE: 70 MMHG | RESPIRATION RATE: 18 BRPM | HEART RATE: 66 BPM | WEIGHT: 233 LBS

## 2019-08-22 DIAGNOSIS — Z23 NEED FOR VACCINATION: ICD-10-CM

## 2019-08-22 DIAGNOSIS — Z00.00 MEDICARE ANNUAL WELLNESS VISIT, INITIAL: Primary | ICD-10-CM

## 2019-08-22 PROCEDURE — G0439 PPPS, SUBSEQ VISIT: HCPCS | Performed by: FAMILY MEDICINE

## 2019-08-22 PROCEDURE — 90715 TDAP VACCINE 7 YRS/> IM: CPT | Performed by: FAMILY MEDICINE

## 2019-08-22 PROCEDURE — 90471 IMMUNIZATION ADMIN: CPT | Performed by: FAMILY MEDICINE

## 2019-08-22 NOTE — PROGRESS NOTES
Assessment and Plan:     Problem List Items Addressed This Visit     None      Visit Diagnoses     Medicare annual wellness visit, initial    -  Primary    Need for vaccination        Relevant Orders    TDAP VACCINE GREATER THAN OR EQUAL TO 6YO IM (Completed)       The patient had a normal exam today in the office  He is stable on his current medications  He will continue with his healthy diet and exercise  Will follow up with his specialists as scheduled  Will see him back in the office again 6 months  He was reminded to follow up for his flu shot in October or November  He is going to check with the pharmacy in regards to the Shingrix shingles vaccine  We will see him back as scheduled  Chief Complaint   Patient presents with   [facility] Wellness Visit     Initial Annual        History of Present Illness:     Patient presents for an initial Medicare wellness visit  Mary Grace Montano is a 60 y o  male who presents today for Medicare wellness visit  he   has been feeling well and has no complaints today  The patient denies any chest pain, shortness of breath, or palpitations  There is no edema  There are no headaches or visual changes  There is no lightheadedness, dizziness, or fainting spells  There are no GI symptoms  The patient goes for dental exams every 6 months and sees his eye doctor  The patient is watching his diet and follows a regular exercise program    He continues to see his Cardiologist and his Psychiatrist regularly  He exercises regularly  He is going to be scheduling a colonoscopy  Patient Care Team:  Dina Mondragon DO as PCP - General (Family Medicine)  Mayra Mckinley MD (Psychiatry)     Review of Systems:     Review of Systems   Constitutional: Negative  HENT: Negative  Eyes: Negative  Respiratory: Negative  Cardiovascular: Negative  Gastrointestinal: Negative  Negative for bowel incontinence  Endocrine: Negative  Genitourinary: Negative  Musculoskeletal: Negative  Skin: Negative  Allergic/Immunologic: Negative  Neurological: Negative  Hematological: Negative  Psychiatric/Behavioral: Negative  The patient is not nervous/anxious  Problem List:     Patient Active Problem List   Diagnosis    [facility] fibrillation [facility]    Benign prostatic hyperplasia    Cataract of both eyes    Hyperlipidemia    Hypertension    Obesity    Obstructive sleep apnea    Osteoarthritis    Parkinsonism [facility]    [facility]    Stasis dermatitis    Venous insufficiency    Subclinical hypothyroidism      Past Medical and Surgical History:     History reviewed  No pertinent past medical history    Past Surgical History:   Procedure Laterality Date    TOTAL HIP ARTHROPLASTY Left 08/2006    TOTAL KNEE ARTHROPLASTY Right 09/17/2011      Family History:     Family History   Problem Relation Age of Onset    Hypertension Mother     Stroke Family         syndrome       Social History:     Social History     Tobacco Use   Smoking Status Never Smoker   Smokeless Tobacco Never Used     Social History     Substance and Sexual Activity   Alcohol Use No     Social History     Substance and Sexual Activity   Drug Use No      Medications and Allergies:     Current Outpatient Medications   Medication Sig Dispense Refill    aspirin 81 MG tablet Take 1 tablet by mouth daily      divalproex sodium (DEPAKOTE) 250 mg EC tablet Take 250 mg by mouth every other day      divalproex sodium (DEPAKOTE) 500 mg EC tablet Take 500 mg by mouth 2 (two) times a day        furosemide (LASIX) 20 mg tablet Take 2 tablets by mouth daily       gabapentin (NEURONTIN) 600 MG tablet Take 600 mg by mouth 2 (two) times a day        metoprolol succinate (TOPROL-XL) 50 mg 24 hr tablet Take 50 mg by mouth 2 (two) times a day      OLANZapine (ZYPREXA) 10 mg tablet Take 10 mg by mouth every evening       OLANZapine (ZyPREXA) 20 MG tablet Once daily to take with a 10mg tab       No current facility-administered medications for this visit  Allergies   Allergen Reactions    Haloperidol      Neuroleptic Malignant Syndrome  Immunizations:     Immunization History   Administered Date(s) Administered    INFLUENZA 11/02/2013, 11/04/2016, 09/23/2017, 10/13/2018    Influenza TIV (IM) 1960, 11/10/2012, 11/04/2016, 09/23/2017    Pneumococcal Polysaccharide PPV23 11/12/2013    Td (adult), adsorbed 1959    Tdap 08/22/2019      Medicare Screening Tests and Risk Assessments:     Chauncey Limon is here for his Initial Wellness visit  Last Medicare Wellness visit information reviewed, patient interviewed and updates made to the record today  Health Risk Assessment:  Patient rates overall health as good  Patient feels that their physical health rating is Same  Eyesight was rated as Same  Hearing was rated as Same  Patient feels that their emotional and mental health rating is Same  Pain experienced by patient in the last 7 days has been None  Emotional/Mental Health:  Patient has not been feeling nervous/anxious  PHQ-9 Depression Screening:    Frequency of the following problems over the past two weeks:      1  Little interest or pleasure in doing things: 0 - not at all      2  Feeling down, depressed, or hopeless: 0 - not at all  PHQ-2 Score: 0          Broken Bones/Falls: Fall Risk Assessment:    In the past year, patient has experienced: No history of falling in past year          Bladder/Bowel:  Patient has not leaked urine accidently in the last six months  Patient reports no loss of bowel control  Immunizations:  Patient has had a flu vaccination within the last year  Patient has received a pneumonia shot  Patient has not received a shingles shot  Patient has not received tetanus/diphtheria shot   (Additional Comments: He is looking into the shingles vaccine   )    Home Safety:  Patient does not have trouble with stairs inside or outside of their home  Patient currently reports that there are no safety hazards present in home, working smoke alarms, working carbon monoxide detectors  Preventative Screenings:   prostate cancer screen performed, no colon cancer screen completed, cholesterol screen completed, glaucoma eye exam completed,     Nutrition:  Current diet: Regular and Limited junk food with servings of the following:    Medications:  Patient is not currently taking any over-the-counter supplements  Patient is able to manage medications  Lifestyle Choices:  Patient reports no tobacco use  Patient has not smoked or used tobacco in the past   Patient reports no alcohol use  Patient drives a vehicle  Patient wears seat belt  Current level of exercise of physical activity described by patient as: He exercises regularly           Activities of Daily Living:  Can get out of bed by his or her self, able to dress self, able to make own meals, able to do own shopping, able to bathe self, can do own laundry/housekeeping, can manage own money, pay bills and track expenses    Previous Hospitalizations:  No hospitalization or ED visit in past 12 months        Advanced Directives:  Patient has not decided on power of   Patient has not completed advanced directive          Preventative Screening/Counseling:      Cardiovascular:      General: Risks and Benefits Discussed and Screening Current      Counseling: Healthy Diet and Healthy Weight          Diabetes:      General: Risks and Benefits Discussed and Screening Current      Counseling: Healthy Diet and Healthy Weight          Colorectal Cancer:      General: Risks and Benefits Discussed      Due for studies: Colonoscopy - Low Risk          Prostate Cancer:      General: Risks and Benefits Discussed and Screening Current          Osteoporosis:      General: Risks and Benefits Discussed and Screening Current          AAA:      General: Screening Not Indicated          Glaucoma: General: Risks and Benefits Discussed and Screening Current          HIV:      General: Screening Not Indicated          Hepatitis C:      General: Screening Not Indicated        Advanced Directives:   Patient has no living will for healthcare, does not have durable POA for healthcare, patient does not have an advanced directive  Information on ACP and/or AD provided  No 5 wishes given  End of life assessment reviewed with patient  Provider agrees with end of life decisions   Immunizations:  Patient reviewed and up to date           Visual Acuity Screening    Right eye Left eye Both eyes   Without correction: 20/40 20/40 20/40   With correction:           Physical Exam:     /70 (BP Location: Left arm, Patient Position: Sitting, Cuff Size: Standard)   Pulse 66   Temp (!) 97 3 °F (36 3 °C) (Tympanic)   Resp 18   Ht 5' 9 3" (1 76 m)   Wt 106 kg (233 lb)   BMI 34 11 kg/m²     Physical Exam   Constitutional: He is oriented to person, place, and time  He appears well-developed and well-nourished  No distress  Eyes: Pupils are equal, round, and reactive to light  EOM are normal    Neck: Normal range of motion  Neck supple  No JVD present  No tracheal deviation present  No thyromegaly present  Cardiovascular: Normal rate, regular rhythm and normal heart sounds  Exam reveals no gallop and no friction rub  No murmur heard  Pulmonary/Chest: Effort normal and breath sounds normal  No stridor  No respiratory distress  He has no wheezes  He has no rales  He exhibits no tenderness  Abdominal: Soft  Bowel sounds are normal  He exhibits no distension and no mass  There is no tenderness  There is no rebound and no guarding  Musculoskeletal: Normal range of motion  Lymphadenopathy:     He has no cervical adenopathy  Neurological: He is alert and oriented to person, place, and time  He has normal reflexes  He displays normal reflexes  No cranial nerve deficit  He exhibits normal muscle tone   Coordination normal    Skin: Skin is warm and dry  No rash noted  He is not diaphoretic  No erythema  No pallor  Nursing note and vitals reviewed        Appointment on 08/12/2019   Component Date Value    WBC 08/12/2019 7 47     RBC 08/12/2019 4 81     Hemoglobin 08/12/2019 13 9     Hematocrit 08/12/2019 43 1     MCV 08/12/2019 90     MCH 08/12/2019 28 9     MCHC 08/12/2019 32 3     RDW 08/12/2019 12 9     MPV 08/12/2019 10 0     Platelets 94/12/3781 235     nRBC 08/12/2019 0     Neutrophils Relative 08/12/2019 49     Immat GRANS % 08/12/2019 0     Lymphocytes Relative 08/12/2019 37     Monocytes Relative 08/12/2019 9     Eosinophils Relative 08/12/2019 4     Basophils Relative 08/12/2019 1     Neutrophils Absolute 08/12/2019 3 64     Immature Grans Absolute 08/12/2019 0 03     Lymphocytes Absolute 08/12/2019 2 74     Monocytes Absolute 08/12/2019 0 69     Eosinophils Absolute 08/12/2019 0 28     Basophils Absolute 08/12/2019 0 09     Sodium 08/12/2019 139     Potassium 08/12/2019 4 0     Chloride 08/12/2019 102     CO2 08/12/2019 30     ANION GAP 08/12/2019 7     BUN 08/12/2019 15     Creatinine 08/12/2019 1 23     Glucose, Fasting 08/12/2019 95     Calcium 08/12/2019 8 8     AST 08/12/2019 30     ALT 08/12/2019 42     Alkaline Phosphatase 08/12/2019 57     Total Protein 08/12/2019 7 3     Albumin 08/12/2019 3 8     Total Bilirubin 08/12/2019 0 74     eGFR 08/12/2019 64     Cholesterol 08/12/2019 155     Triglycerides 08/12/2019 222*    HDL, Direct 08/12/2019 34*    LDL Calculated 08/12/2019 77     TSH 3RD GENERATON 08/12/2019 4 110*    Color, UA 08/12/2019 Dk Yellow     Clarity, UA 08/12/2019 Clear     Specific Gravity, UA 08/12/2019 1 024     pH, UA 08/12/2019 6 5     Leukocytes, UA 08/12/2019 Negative     Nitrite, UA 08/12/2019 Negative     Protein, UA 08/12/2019 Trace*    Glucose, UA 08/12/2019 Negative     Ketones, UA 08/12/2019 Negative     Urobilinogen, UA 08/12/2019 0  2     Bilirubin, UA 08/12/2019 Negative     Blood, UA 08/12/2019 Negative     Prostate Specific Antige* 08/12/2019 1 1     PSA, Free 08/12/2019 0 24     PSA, Free Pct 08/12/2019 21 8     HIV-1/HIV-2 Ab 08/12/2019 Non-Reactive     RBC, UA 08/12/2019 4-10*    WBC, UA 08/12/2019 None Seen     Epithelial Cells 08/12/2019 None Seen     Bacteria, UA 08/12/2019 None Seen     Hyaline Casts, UA 08/12/2019 None Seen     Free T4 08/12/2019 0 87

## 2019-08-22 NOTE — PATIENT INSTRUCTIONS
Obesity   AMBULATORY CARE:   Obesity  is when your body mass index (BMI) is greater than 30  Your healthcare provider will use your height and weight to measure your BMI  The risks of obesity include  many health problems, such as injuries or physical disability  You may need tests to check for the following:  · Diabetes     · High blood pressure or high cholesterol     · Heart disease     · Gallbladder or liver disease     · Cancer of the colon, breast, prostate, liver, or kidney     · Sleep apnea     · Arthritis or gout  Seek care immediately if:   · You have a severe headache, confusion, or difficulty speaking  · You have weakness on one side of your body  · You have chest pain, sweating, or shortness of breath  Contact your healthcare provider if:   · You have symptoms of gallbladder or liver disease, such as pain in your upper abdomen  · You have knee or hip pain and discomfort while walking  · You have symptoms of diabetes, such as intense hunger and thirst, and frequent urination  · You have symptoms of sleep apnea, such as snoring or daytime sleepiness  · You have questions or concerns about your condition or care  Treatment for obesity  focuses on helping you lose weight to improve your health  Even a small decrease in BMI can reduce the risk for many health problems  Your healthcare provider will help you set a weight-loss goal   · Lifestyle changes  are the first step in treating obesity  These include making healthy food choices and getting regular physical activity  Your healthcare provider may suggest a weight-loss program that involves coaching, education, and therapy  · Medicine  may help you lose weight when it is used with a healthy diet and physical activity  · Surgery  can help you lose weight if you are very obese and have other health problems  There are several types of weight-loss surgery  Ask your healthcare provider for more information    Be successful losing weight:   · Set small, realistic goals  An example of a small goal is to walk for 20 minutes 5 days a week  Anther goal is to lose 5% of your body weight  · Tell friends, family members, and coworkers about your goals  and ask for their support  Ask a friend to lose weight with you, or join a weight-loss support group  · Identify foods or triggers that may cause you to overeat , and find ways to avoid them  Remove tempting high-calorie foods from your home and workplace  Place a bowl of fresh fruit on your kitchen counter  If stress causes you to eat, then find other ways to cope with stress  · Keep a diary to track what you eat and drink  Also write down how many minutes of physical activity you do each day  Weigh yourself once a week and record it in your diary  Eating changes: You will need to eat 500 to 1,000 fewer calories each day than you currently eat to lose 1 to 2 pounds a week  The following changes will help you cut calories:  · Eat smaller portions  Use small plates, no larger than 9 inches in diameter  Fill your plate half full of fruits and vegetables  Measure your food using measuring cups until you know what a serving size looks like  · Eat 3 meals and 1 or 2 snacks each day  Plan your meals in advance  Zonia Stalls and eat at home most of the time  Eat slowly  · Eat fruits and vegetables at every meal   They are low in calories and high in fiber, which makes you feel full  Do not add butter, margarine, or cream sauce to vegetables  Use herbs to season steamed vegetables  · Eat less fat and fewer fried foods  Eat more baked or grilled chicken and fish  These protein sources are lower in calories and fat than red meat  Limit fast food  Dress your salads with olive oil and vinegar instead of bottled dressing  · Limit the amount of sugar you eat  Do not drink sugary beverages  Limit alcohol  Activity changes:  Physical activity is good for your body in many ways   It helps you burn calories and build strong muscles  It decreases stress and depression, and improves your mood  It can also help you sleep better  Talk to your healthcare provider before you begin an exercise program   · Exercise for at least 30 minutes 5 days a week  Start slowly  Set aside time each day for physical activity that you enjoy and that is convenient for you  It is best to do both weight training and an activity that increases your heart rate, such as walking, bicycling, or swimming  · Find ways to be more active  Do yard work and housecleaning  Walk up the stairs instead of using elevators  Spend your leisure time going to events that require walking, such as outdoor festivals or fairs  This extra physical activity can help you lose weight and keep it off  Follow up with your healthcare provider as directed: You may need to meet with a dietitian  Write down your questions so you remember to ask them during your visits  © 2017 [address] [employer] Information is for End User's use only and may not be sold, redistributed or otherwise used for commercial purposes  All illustrations and images included in CareNotes® are the copyrighted property of [employer]  or Feroz Hernandez  The above information is an  only  It is not intended as medical advice for individual conditions or treatments  Talk to your doctor, nurse or pharmacist before following any medical regimen to see if it is safe and effective for you  Urinary Incontinence   WHAT YOU NEED TO KNOW:   What is urinary incontinence? Urinary incontinence (UI) is when you lose control of your bladder  What causes UI? UI occurs because your bladder cannot store or empty urine properly  The following are the most common types of UI:  · Stress incontinence  is when you leak urine due to increased bladder pressure  This may happen when you cough, sneeze, or exercise       · Urge incontinence  is when you feel the need to urinate right away and leak urine accidentally  · Mixed incontinence  is when you have both stress and urge UI  What are the signs and symptoms of UI?   · You feel like your bladder does not empty completely when you urinate  · You urinate often and need to urinate immediately  · You leak urine when you sleep, or you wake up with the urge to urinate  · You leak urine when you cough, sneeze, exercise, or laugh  How is UI diagnosed? Your healthcare provider will ask how often you leak urine and whether you have stress or urge symptoms  Tell him which medicines you take, how often you urinate, and how much liquid you drink each day  You may need any of the following tests:  · Urine tests  may show infection or kidney function  · A pelvic exam  may be done to check for blockages  A pelvic exam will also show if your bladder, uterus, or other organs have moved out of place  · An x-ray, ultrasound, or CT  may show problems with parts of your urinary system  You may be given contrast liquid to help your organs show up better in the pictures  Tell the healthcare provider if you have ever had an allergic reaction to contrast liquid  Do not enter the MRI room with anything metal  Metal can cause serious injury  Tell the healthcare provider if you have any metal in or on your body  · A bladder scan  will show how much urine is left in your bladder after you urinate  You will be asked to urinate and then healthcare providers will use a small ultrasound machine to check the urine left in your bladder  · Cystometry  is used to check the function of your urinary system  Your healthcare provider checks the pressure in your bladder while filling it with fluid  Your bladder pressure may also be tested when your bladder is full and while you urinate  How is UI treated? · Medicines  can help strengthen your bladder control      · Electrical stimulation  is used to send a small amount of electrical energy to your pelvic floor muscles  This helps control your bladder function  Electrodes may be placed outside your body or in your rectum  For women, the electrodes may be placed in the vagina  · A bulking agent  may be injected into the wall of your urethra to make it thicker  This helps keep your urethra closed and decreases urine leakage  · Devices  such as a clamp, pessary, or tampon may help stop urine leaks  Ask your healthcare provider for more information about these and other devices  · Surgery  may be needed if other treatments do not work  Several types of surgery can help improve your bladder control  Ask your healthcare provider for more information about the surgery you may need  How can I manage my symptoms? · Do pelvic muscle exercises often  Your pelvic muscles help you stop urinating  Squeeze these muscles tight for 5 seconds, then relax for 5 seconds  Gradually work up to squeezing for 10 seconds  Do 3 sets of 15 repetitions a day, or as directed  This will help strengthen your pelvic muscles and improve bladder control  · A catheter  may be used to help empty your bladder  A catheter is a tiny, plastic tube that is put into your bladder to drain your urine  Your healthcare provider may tell you to use a catheter to prevent your bladder from getting too full and leaking urine  · Keep a UI record  Write down how often you leak urine and how much you leak  Make a note of what you were doing when you leaked urine  · Train your bladder  Go to the bathroom at set times, such as every 2 hours, even if you do not feel the urge to go  You can also try to hold your urine when you feel the urge to go  For example, hold your urine for 5 minutes when you feel the urge to go  As that becomes easier, hold your urine for 10 minutes  · Drink liquids as directed  Ask your healthcare provider how much liquid to drink each day and which liquids are best for you   You may need to limit the amount of liquid you drink to help control your urine leakage  Limit or do not have drinks that contain caffeine or alcohol  Do not drink any liquid right before you go to bed  · Prevent constipation  Eat a variety of high-fiber foods  Good examples are high-fiber cereals, beans, vegetables, and whole-grain breads  Prune juice may help make your bowel movement softer  Walking is the best way to trigger your intestines to have a bowel movement  · Exercise regularly and maintain a healthy weight  Ask your healthcare provider how much you should weigh and about the best exercise plan for you  Weight loss and exercise will decrease pressure on your bladder and help you control your leakage  Ask him to help you create a weight loss plan if you are overweight  When should I seek immediate care? · You have severe pain  · You are confused or cannot think clearly  When should I contact my healthcare provider? · You have a fever  · You see blood in your urine  · You have pain when you urinate  · You have new or worse pain, even after treatment  · Your mouth feels dry or you have vision changes  · Your urine is cloudy or smells bad  · You have questions or concerns about your condition or care  CARE AGREEMENT:   You have the right to help plan your care  Learn about your health condition and how it may be treated  Discuss treatment options with your caregivers to decide what care you want to receive  You always have the right to refuse treatment  The above information is an  only  It is not intended as medical advice for individual conditions or treatments  Talk to your doctor, nurse or pharmacist before following any medical regimen to see if it is safe and effective for you  © 2017 [address] [employer] Information is for End User's use only and may not be sold, redistributed or otherwise used for commercial purposes   All illustrations and images included in CareNotes® are the copyrighted property of [employer] M [employer] Inc  or Feroz Hernandez  Cigarette Smoking and Your Health   AMBULATORY CARE:   Risks to your health if you smoke:  Nicotine and other chemicals found in tobacco damage every cell in your body  Even if you are a light smoker, you have an increased risk for cancer, heart disease, and lung disease  If you are pregnant or have diabetes, smoking increases your risk for complications  Benefits to your health if you stop smoking:   · You decrease respiratory symptoms such as coughing, wheezing, and shortness of breath  · You reduce your risk for cancers of the lung, mouth, throat, kidney, bladder, pancreas, stomach, and cervix  If you already have cancer, you increase the benefits of chemotherapy  You also reduce your risk for cancer returning or a second cancer from developing  · You reduce your risk for heart disease, blood clots, heart attack, and stroke  · You reduce your risk for lung infections, and diseases such as pneumonia, asthma, chronic bronchitis, and emphysema  · Your circulation improves  More oxygen can be delivered to your body  If you have diabetes, you lower your risk for complications, such as kidney, artery, and eye diseases  You also lower your risk for nerve damage  Nerve damage can lead to amputations, poor vision, and blindness  · You improve your body's ability to heal and to fight infections  Benefits to the health of others if you stop smoking:  Tobacco is harmful to nonsmokers who breathe in your secondhand smoke  The following are ways the health of others around you may improve when you stop smoking:  · You lower the risks for lung cancer and heart disease in nonsmoking adults  · If you are pregnant, you lower the risk for miscarriage, early delivery, low birth weight, and stillbirth  You also lower your baby's risk for SIDS, obesity, developmental delay, and neurobehavioral problems, such as ADHD  · If you have children, you lower their risk for ear infections, colds, pneumonia, bronchitis, and asthma  For more information and support to stop smoking:   · Smokefree  gov  Phone: 8- 029 - 172-4033  Web Address: www smokefrM[employer]  Follow up with your healthcare provider as directed:  Write down your questions so you remember to ask them during your visits  © 2017 [address] [employer] Information is for End User's use only and may not be sold, redistributed or otherwise used for commercial purposes  All illustrations and images included in CareNotes® are the copyrighted property of [employer] M [employer]  or Feroz Hernandez  The above information is an  only  It is not intended as medical advice for individual conditions or treatments  Talk to your doctor, nurse or pharmacist before following any medical regimen to see if it is safe and effective for you  Fall Prevention   WHAT YOU NEED TO KNOW:   What is fall prevention? Fall prevention includes ways to make your home and other areas safer  It also includes ways you can move more carefully to prevent a fall  What increases my risk for falls? · Lack of vitamin D    · Not getting enough sleep each night    · Trouble walking or keeping your balance, or foot problems    · Health conditions that cause changes in your blood pressure, vision, or muscle strength and coordination    · Medicines that make you dizzy, weak, or sleepy    · Problems seeing clearly    · Shoes that have high heels or are not supportive    · Tripping hazards, such as items left on the floor, no handrails on the stairs, or broken steps  How can I help protect myself from falls? · Stand or sit up slowly  This may help you keep your balance and prevent falls  If you need to get up during the night, sit up first  Be sure you are fully awake before you stand  Turn on the light before you start walking  Go slowly in case you are still sleepy   Make sure you will not trip over any pets sleeping in the bedroom  · Use assistive devices as directed  Your healthcare provider may suggest that you use a cane or walker to help you keep your balance  You may need to have grab bars put in your bathroom near the toilet or in the shower  · Wear shoes that fit well and have soles that   Wear shoes both inside and outside  Use slippers with good   Do not wear shoes with high heels  · Wear a personal alarm  This is a device that allows you to call 911 if you fall and need help  Ask your healthcare provider for more information  · Stay active  Exercise can help strengthen your muscles and improve your balance  Your healthcare provider may recommend water aerobics or walking  He or she may also recommend physical therapy to improve your coordination  Never start an exercise program without talking to your healthcare provider first      · Manage medical conditions  Keep all appointments with your healthcare providers  Visit your eye doctor as directed  How can I make my home safer? · Add items to prevent falls in the bathroom  Put nonslip strips on your bath or shower floor to prevent you from slipping  Use a bath mat if you do not have carpet in the bathroom  This will prevent you from falling when you step out of the bath or shower  Use a shower seat so you do not need to stand while you shower  Sit on the toilet or a chair in your bathroom to dry yourself and put on clothing  This will prevent you from losing your balance from drying or dressing yourself while you are standing  · Keep paths clear  Remove books, shoes, and other objects from walkways and stairs  Place cords for telephones and lamps out of the way so that you do not need to walk over them  Tape them down if you cannot move them  Remove small rugs  If you cannot remove a rug, secure it with double-sided tape  This will prevent you from tripping  · Install bright lights in your home  Use night lights to help light paths to the bathroom or kitchen  Always turn on the light before you start walking  · Keep items you use often on shelves within reach  Do not use a step stool to help you reach an item  · Paint or place reflective tape on the edges of your stairs  This will help you see the stairs better  Call 911 or have someone else call if:   · You have fallen and are unconscious  · You have fallen and cannot move part of your body  Contact your healthcare provider if:   · You have fallen and have pain or a headache  · You have questions or concerns about your condition or care  CARE AGREEMENT:   You have the right to help plan your care  Learn about your health condition and how it may be treated  Discuss treatment options with your caregivers to decide what care you want to receive  You always have the right to refuse treatment  The above information is an  only  It is not intended as medical advice for individual conditions or treatments  Talk to your doctor, nurse or pharmacist before following any medical regimen to see if it is safe and effective for you  © 2017 [address] [facility] Information is for End User's use only and may not be sold, redistributed or otherwise used for commercial purposes  All illustrations and images included in CareNotes® are the copyrighted property of [employer]  or Feroz Hernandez  Advance Directives   WHAT YOU NEED TO KNOW:   What are advance directives? Advance directives are legal documents that state your wishes and plans for medical care  These plans are made ahead of time in case you lose your ability to make decisions for yourself  Advance directives can apply to any medical decision, such as the treatments you want, and if you want to donate organs  What are the types of advance directives? There are many types of advance directives, and each state has rules about how to use them   You may choose a combination of any of the following:  · Living will: This is a written record of the treatment you want  You can also choose which treatments you do not want, which to limit, and which to stop at a certain time  This includes surgery, medicine, IV fluid, and tube feedings  · Durable power of  for healthcare [facility]): This is a written record that states who you want to make healthcare choices for you when you are unable to make them for yourself  This person, called a proxy, is usually a family member or a friend  You may choose more than 1 proxy  · Do not resuscitate (DNR) order:  A DNR order is used in case your heart stops beating or you stop breathing  It is a request not to have certain forms of treatment, such as CPR  A DNR order may be included in other types of advance directives  · Medical directive: This covers the care that you want if you are in a coma, near death, or unable to make decisions for yourself  You can list the treatments you want for each condition  Treatment may include pain medicine, surgery, blood transfusions, dialysis, IV or tube feedings, and a ventilator (breathing machine)  · Values history: This document has questions about your views, beliefs, and how you feel and think about life  This information can help others choose the care that you would choose  Why are advance directives important? An advance directive helps you control your care  Although spoken wishes may be used, it is better to have your wishes written down  Spoken wishes can be misunderstood, or not followed  Treatments may be given even if you do not want them  An advance directive may make it easier for your family to make difficult choices about your care  How do I decide what to put in my advance directives? · Make informed decisions:  Make sure you fully understand treatments or care you may receive   Think about the benefits and problems your decisions could cause for you or your family  Talk to healthcare providers if you have concerns or questions before you write down your wishes  You may also want to talk with your Jain or , or a   Check your state laws to make sure that what you put in your advance directive is legal      · Sign all forms:  Sign and date your advance directive when you have finished  You may also need 2 witnesses to sign the forms  Witnesses cannot be your doctor or his staff, your spouse, heirs or beneficiaries, people you owe money to, or your chosen proxy  Talk to your family, proxy, and healthcare providers about your advance directive  Give each person a copy, and keep one for yourself in a place you can get to easily  Do not keep it hidden or locked away  · Review and revise your plans: You can revise your advance directive at any time, as long as you are able to make decisions  Review your plan every year, and when there are changes in your life, or your health  When you make changes, let your family, proxy, and healthcare providers know  Give each a new copy  Where can I find more information? · [employer]  [address] , [address]  Phone: 9- 625 - 140-2014  Phone: 4- 946 - 852-6752  Web Address: http://www  aafp org  · [address] [facility])  [address] [facility], [address] [facility] , [address] [place] [address]  Phone: 5- [phone] - [phone]  Phone: [phone]  Web Address: Amy pedersen  [facility] AGREEMENT:   You have the right to help plan your care  To help with this plan, you must learn about your health condition and treatment options  You must also learn about advance directives and how they are used  Work with your healthcare providers to decide what care will be used to treat you  You always have the right to refuse treatment  The above information is an  only   It is not intended as medical advice for individual conditions or treatments  Talk to your doctor, nurse or pharmacist before following any medical regimen to see if it is safe and effective for you  © 2017 [address] [employer] Information is for End User's use only and may not be sold, redistributed or otherwise used for commercial purposes  All illustrations and images included in CareNotes® are the copyrighted property of A D A M , Inc  or Feroz Hernandez

## 2020-02-27 ENCOUNTER — APPOINTMENT (OUTPATIENT)
Dept: LAB | Facility: CLINIC | Age: 60
End: 2020-02-27
Payer: MEDICARE

## 2020-02-27 ENCOUNTER — OFFICE VISIT (OUTPATIENT)
Dept: FAMILY MEDICINE CLINIC | Facility: CLINIC | Age: 60
End: 2020-02-27
Payer: MEDICARE

## 2020-02-27 VITALS
SYSTOLIC BLOOD PRESSURE: 110 MMHG | DIASTOLIC BLOOD PRESSURE: 80 MMHG | WEIGHT: 233.6 LBS | HEART RATE: 77 BPM | TEMPERATURE: 98 F | OXYGEN SATURATION: 96 % | BODY MASS INDEX: 34.6 KG/M2 | HEIGHT: 69 IN

## 2020-02-27 DIAGNOSIS — E78.2 MIXED HYPERLIPIDEMIA: ICD-10-CM

## 2020-02-27 DIAGNOSIS — Z12.11 SCREENING FOR COLON CANCER: ICD-10-CM

## 2020-02-27 DIAGNOSIS — I10 ESSENTIAL HYPERTENSION: ICD-10-CM

## 2020-02-27 DIAGNOSIS — F20.9 CHRONIC SCHIZOPHRENIA (HCC): ICD-10-CM

## 2020-02-27 DIAGNOSIS — Z11.59 ENCOUNTER FOR HEPATITIS C SCREENING TEST FOR LOW RISK PATIENT: ICD-10-CM

## 2020-02-27 DIAGNOSIS — E03.8 SUBCLINICAL HYPOTHYROIDISM: ICD-10-CM

## 2020-02-27 DIAGNOSIS — I10 ESSENTIAL HYPERTENSION: Primary | ICD-10-CM

## 2020-02-27 DIAGNOSIS — I48.21 PERMANENT ATRIAL FIBRILLATION (HCC): ICD-10-CM

## 2020-02-27 LAB
ALBUMIN SERPL BCP-MCNC: 4.2 G/DL (ref 3.5–5)
ALP SERPL-CCNC: 61 U/L (ref 46–116)
ALT SERPL W P-5'-P-CCNC: 39 U/L (ref 12–78)
ANION GAP SERPL CALCULATED.3IONS-SCNC: 4 MMOL/L (ref 4–13)
AST SERPL W P-5'-P-CCNC: 25 U/L (ref 5–45)
BILIRUB SERPL-MCNC: 0.43 MG/DL (ref 0.2–1)
BUN SERPL-MCNC: 17 MG/DL (ref 5–25)
CALCIUM SERPL-MCNC: 8.7 MG/DL (ref 8.3–10.1)
CHLORIDE SERPL-SCNC: 107 MMOL/L (ref 100–108)
CHOLEST SERPL-MCNC: 144 MG/DL (ref 50–200)
CO2 SERPL-SCNC: 29 MMOL/L (ref 21–32)
CREAT SERPL-MCNC: 1.11 MG/DL (ref 0.6–1.3)
GFR SERPL CREATININE-BSD FRML MDRD: 72 ML/MIN/1.73SQ M
GLUCOSE P FAST SERPL-MCNC: 101 MG/DL (ref 65–99)
HCV AB SER QL: NORMAL
HDLC SERPL-MCNC: 37 MG/DL
LDLC SERPL CALC-MCNC: 69 MG/DL (ref 0–100)
LDLC SERPL DIRECT ASSAY-MCNC: 82 MG/DL (ref 0–100)
NONHDLC SERPL-MCNC: 107 MG/DL
POTASSIUM SERPL-SCNC: 4.3 MMOL/L (ref 3.5–5.3)
PROT SERPL-MCNC: 7.6 G/DL (ref 6.4–8.2)
SODIUM SERPL-SCNC: 140 MMOL/L (ref 136–145)
T4 FREE SERPL-MCNC: 0.99 NG/DL (ref 0.76–1.46)
TRIGL SERPL-MCNC: 191 MG/DL
TSH SERPL DL<=0.05 MIU/L-ACNC: 4.59 UIU/ML (ref 0.36–3.74)
VALPROATE SERPL-MCNC: 57 UG/ML (ref 50–100)

## 2020-02-27 PROCEDURE — 86803 HEPATITIS C AB TEST: CPT

## 2020-02-27 PROCEDURE — 3074F SYST BP LT 130 MM HG: CPT | Performed by: FAMILY MEDICINE

## 2020-02-27 PROCEDURE — 80164 ASSAY DIPROPYLACETIC ACD TOT: CPT

## 2020-02-27 PROCEDURE — 3008F BODY MASS INDEX DOCD: CPT | Performed by: FAMILY MEDICINE

## 2020-02-27 PROCEDURE — 80053 COMPREHEN METABOLIC PANEL: CPT

## 2020-02-27 PROCEDURE — 83721 ASSAY OF BLOOD LIPOPROTEIN: CPT

## 2020-02-27 PROCEDURE — 99214 OFFICE O/P EST MOD 30 MIN: CPT | Performed by: FAMILY MEDICINE

## 2020-02-27 PROCEDURE — 84439 ASSAY OF FREE THYROXINE: CPT

## 2020-02-27 PROCEDURE — 84443 ASSAY THYROID STIM HORMONE: CPT

## 2020-02-27 PROCEDURE — 3079F DIAST BP 80-89 MM HG: CPT | Performed by: FAMILY MEDICINE

## 2020-02-27 PROCEDURE — 1036F TOBACCO NON-USER: CPT | Performed by: FAMILY MEDICINE

## 2020-02-27 PROCEDURE — 80061 LIPID PANEL: CPT

## 2020-02-27 PROCEDURE — 36415 COLL VENOUS BLD VENIPUNCTURE: CPT

## 2020-02-27 NOTE — ASSESSMENT & PLAN NOTE
The patient will go for the up-to-date thyroid panel as ordered and will follow up with the results  We will have further instructions at that time

## 2020-02-27 NOTE — ASSESSMENT & PLAN NOTE
The patient will go for the up-to-date lipid panel as ordered  He will continue to work on improving his diet and exercise  We will see him back as scheduled

## 2020-02-27 NOTE — ASSESSMENT & PLAN NOTE
The patient's blood pressure is well controlled with his current medication  We have made no changes today  He will go for the lab work as ordered and we will follow up with the results  We will see him back as scheduled for his Medicare wellness visit

## 2020-02-27 NOTE — PROGRESS NOTES
Assessment/Plan:  Hypertension  The patient's blood pressure is well controlled with his current medication  We have made no changes today  He will go for the lab work as ordered and we will follow up with the results  We will see him back as scheduled for his Medicare wellness visit  Subclinical hypothyroidism  The patient will go for the up-to-date thyroid panel as ordered and will follow up with the results  We will have further instructions at that time  Hyperlipidemia  The patient will go for the up-to-date lipid panel as ordered  He will continue to work on improving his diet and exercise  We will see him back as scheduled  Diagnoses and all orders for this visit:    Essential hypertension  -     Comprehensive metabolic panel; Future  -     LDL cholesterol, direct; Future  -     Lipid panel; Future  -     T4, free; Future  -     TSH, 3rd generation; Future    Subclinical hypothyroidism  -     T4, free; Future  -     TSH, 3rd generation; Future    Mixed hyperlipidemia  -     Comprehensive metabolic panel; Future  -     LDL cholesterol, direct; Future  -     Lipid panel; Future    Permanent atrial fibrillation    Encounter for hepatitis C screening test for low risk patient  -     Hepatitis C antibody; Future    Screening for colon cancer  -     Ambulatory referral to Gastroenterology; Future    [facility] ([facility] )  -     Valproic acid level, total; Future       Return in about 6 months (around 8/27/2020) for Annual physical- [facility]  Subjective:   Chief Complaint   Patient presents with    Follow-up     Check up 6 months Hypertension        Patient ID: Suleiman Mckeon is a 60 y o  male presents today for a routine checkup  Suleiman Mckeon is a 60 y o  Male  The patient denies any chest pain, shortness of breath, or palpitations  There is no edema  There are no headaches or visual changes  There is no lightheadedness, dizziness, or fainting spells    The patient currently denies any nausea, vomiting, or GERD symptoms  he has normal bowel movements and normal urine output  he has a normal appetite  He still needs to schedule his colonoscopy and is planning to schedule it in the near future  Hypertension   This is a chronic problem  The current episode started more than 1 year ago  The problem is unchanged  The problem is controlled  Pertinent negatives include no anxiety, blurred vision, chest pain, headaches, malaise/fatigue, neck pain, orthopnea, palpitations, peripheral edema, PND, shortness of breath or sweats  The following portions of the patient's history were reviewed and updated as appropriate: allergies, current medications, past family history, past medical history, past social history, past surgical history and problem list   Patient Active Problem List   Diagnosis    Atrial fibrillation ([facility] )    Benign prostatic hyperplasia    Cataract of both eyes    Hyperlipidemia    Hypertension    Obesity    Obstructive sleep apnea    Osteoarthritis    [facility]    Chronic schizophrenia ([facility] )    Stasis dermatitis    Venous insufficiency    Subclinical hypothyroidism     History reviewed  No pertinent past medical history  Past Surgical History:   Procedure Laterality Date    TOTAL HIP ARTHROPLASTY Left 08/2006    TOTAL KNEE ARTHROPLASTY Right 09/17/2011     Allergies   Allergen Reactions    Haloperidol      Neuroleptic Malignant Syndrome         Family History   Problem Relation Age of Onset    Hypertension Mother     Stroke Family         syndrome      Social History     Socioeconomic History    Marital status: /Civil Union     Spouse name: Not on file    Number of children: Not on file    Years of education: Not on file    Highest education level: Not on file   Occupational History    Not on file   Social Needs    Financial resource strain: Not on file    Food insecurity:     Worry: Not on file     Inability: Not on file   Justyna Smith Transportation needs:     Medical: Not on file     Non-medical: Not on file   Tobacco Use    Smoking status: Never Smoker    Smokeless tobacco: Never Used   Substance and Sexual Activity    Alcohol use: No    Drug use: No    Sexual activity: Not on file   Lifestyle    Physical activity:     Days per week: Not on file     Minutes per session: Not on file    Stress: Not on file   Relationships    Social connections:     Talks on phone: Not on file     Gets together: Not on file     Attends Faith service: Not on file     Active member of club or organization: Not on file     Attends meetings of clubs or organizations: Not on file     Relationship status: Not on file    Intimate partner violence:     Fear of current or ex partner: Not on file     Emotionally abused: Not on file     Physically abused: Not on file     Forced sexual activity: Not on file   Other Topics Concern    Not on file   Social History Narrative    Physical disability      Current Outpatient Medications on File Prior to Visit   Medication Sig Dispense Refill    aspirin 81 MG tablet Take 1 tablet by mouth daily      divalproex sodium (DEPAKOTE) 250 mg EC tablet Take 250 mg by mouth every other day      divalproex sodium (DEPAKOTE) 500 mg EC tablet Take 500 mg by mouth 2 (two) times a day        furosemide (LASIX) 20 mg tablet Take 2 tablets by mouth daily       gabapentin (NEURONTIN) 600 MG tablet Take 600 mg by mouth 2 (two) times a day        metoprolol succinate (TOPROL-XL) 50 mg 24 hr tablet Take 50 mg by mouth 2 (two) times a day      OLANZapine (ZYPREXA) 10 mg tablet Take 10 mg by mouth every evening       OLANZapine (ZyPREXA) 20 MG tablet Once daily to take with a 10mg tab       No current facility-administered medications on file prior to visit  Review of Systems   Constitutional: Negative  Negative for malaise/fatigue  HENT: Negative  Eyes: Negative  Negative for blurred vision  Respiratory: Negative  Negative for shortness of breath  Cardiovascular: Negative  Negative for chest pain, palpitations, orthopnea and PND  Gastrointestinal: Negative  Endocrine: Negative  Genitourinary: Negative  Musculoskeletal: Negative  Negative for neck pain  Skin: Negative  Allergic/Immunologic: Negative  Neurological: Negative  Negative for headaches  Hematological: Negative  Psychiatric/Behavioral: Negative  Objective:  Vitals:    02/27/20 0902   BP: 110/80   BP Location: Left arm   Patient Position: Sitting   Cuff Size: Standard   Pulse: 77   Temp: 98 °F (36 7 °C)   TempSrc: Tympanic   SpO2: 96%   Weight: 106 kg (233 lb 9 6 oz)   Height: 5' 9 3" (1 76 m)     Body mass index is 34 2 kg/m²  Physical Exam   Constitutional: He is oriented to person, place, and time  He appears well-developed and well-nourished  No distress  Eyes: Pupils are equal, round, and reactive to light  EOM are normal    Neck: Normal range of motion  Neck supple  No JVD present  No tracheal deviation present  No thyromegaly present  Cardiovascular: Normal rate, regular rhythm and normal heart sounds  Exam reveals no gallop and no friction rub  No murmur heard  Pulmonary/Chest: Effort normal and breath sounds normal  No stridor  No respiratory distress  He has no wheezes  He has no rales  He exhibits no tenderness  Abdominal: Soft  Bowel sounds are normal  He exhibits no distension and no mass  There is no tenderness  There is no rebound and no guarding  Musculoskeletal: Normal range of motion  Lymphadenopathy:     He has no cervical adenopathy  Neurological: He is alert and oriented to person, place, and time  He has normal reflexes  He displays normal reflexes  No cranial nerve deficit  He exhibits normal muscle tone  Coordination normal    Skin: Skin is warm and dry  No rash noted  He is not diaphoretic  No erythema  No pallor  Nursing note and vitals reviewed        No visits with results within 2 Month(s) from this visit     Latest known visit with results is:   Appointment on 08/12/2019   Component Date Value    WBC 08/12/2019 7 47     RBC 08/12/2019 4 81     Hemoglobin 08/12/2019 13 9     Hematocrit 08/12/2019 43 1     MCV 08/12/2019 90     MCH 08/12/2019 28 9     MCHC 08/12/2019 32 3     RDW 08/12/2019 12 9     MPV 08/12/2019 10 0     Platelets 81/54/1913 235     nRBC 08/12/2019 0     Neutrophils Relative 08/12/2019 49     Immat GRANS % 08/12/2019 0     Lymphocytes Relative 08/12/2019 37     Monocytes Relative 08/12/2019 9     Eosinophils Relative 08/12/2019 4     Basophils Relative 08/12/2019 1     Neutrophils Absolute 08/12/2019 3 64     Immature Grans Absolute 08/12/2019 0 03     Lymphocytes Absolute 08/12/2019 2 74     Monocytes Absolute 08/12/2019 0 69     Eosinophils Absolute 08/12/2019 0 28     Basophils Absolute 08/12/2019 0 09     Sodium 08/12/2019 139     Potassium 08/12/2019 4 0     Chloride 08/12/2019 102     CO2 08/12/2019 30     ANION GAP 08/12/2019 7     BUN 08/12/2019 15     Creatinine 08/12/2019 1 23     Glucose, Fasting 08/12/2019 95     Calcium 08/12/2019 8 8     AST 08/12/2019 30     ALT 08/12/2019 42     Alkaline Phosphatase 08/12/2019 57     Total Protein 08/12/2019 7 3     Albumin 08/12/2019 3 8     Total Bilirubin 08/12/2019 0 74     eGFR 08/12/2019 64     Cholesterol 08/12/2019 155     Triglycerides 08/12/2019 222*    HDL, Direct 08/12/2019 34*    LDL Calculated 08/12/2019 77     TSH 3RD GENERATON 08/12/2019 4 110*    Color, UA 08/12/2019 Dk Yellow     Clarity, UA 08/12/2019 Clear     Specific Gravity, UA 08/12/2019 1 024     pH, UA 08/12/2019 6 5     Leukocytes, UA 08/12/2019 Negative     Nitrite, UA 08/12/2019 Negative     Protein, UA 08/12/2019 Trace*    Glucose, UA 08/12/2019 Negative     Ketones, UA 08/12/2019 Negative     Urobilinogen, UA 08/12/2019 0 2     Bilirubin, UA 08/12/2019 Negative     Blood, UA 08/12/2019 Negative     Prostate Specific Antige* 08/12/2019 1 1     PSA, Free 08/12/2019 0 24     PSA, Free Pct 08/12/2019 21 8     HIV-1/HIV-2 Ab 08/12/2019 Non-Reactive     RBC, UA 08/12/2019 4-10*    WBC, UA 08/12/2019 None Seen     Epithelial Cells 08/12/2019 None Seen     Bacteria, UA 08/12/2019 None Seen     Hyaline Casts, UA 08/12/2019 None Seen     Free T4 08/12/2019 0 87       BMI Counseling: Body mass index is 34 2 kg/m²  The BMI is above normal  Nutrition recommendations include decreasing portion sizes, encouraging healthy choices of fruits and vegetables, decreasing fast food intake, consuming healthier snacks, limiting drinks that contain sugar, moderation in carbohydrate intake and increasing intake of lean protein  Exercise recommendations include exercising 3-5 times per week and strength training exercises  No pharmacotherapy was ordered  Patient referred to PCP due to patient being overweight

## 2020-03-09 ENCOUNTER — TELEPHONE (OUTPATIENT)
Dept: FAMILY MEDICINE CLINIC | Facility: CLINIC | Age: 60
End: 2020-03-09

## 2020-03-09 NOTE — TELEPHONE ENCOUNTER
Please let the patient know that his labs looked okay but his fasting blood sugar and triglycerides were mildly elevated  So he should watch his diet  Otherwise everything else looked okay  We will see him back as scheduled

## 2020-03-10 ENCOUNTER — TELEPHONE (OUTPATIENT)
Dept: FAMILY MEDICINE CLINIC | Facility: CLINIC | Age: 60
End: 2020-03-10

## 2020-03-10 NOTE — TELEPHONE ENCOUNTER
Message left on patient's cell [phone]:  Labs all OK except triglycerides and FBS a little elevated  Needs to decrease intake of starchy and sugary foods and drinks  Call office if has any ? Yogi RICHTER

## 2020-03-16 NOTE — TELEPHONE ENCOUNTER
I did speak with the patient on 3/10/2020 and reviewed the results of his testing  His TSH was mildly elevated with a normal T4 free consistent with his previously known diagnosis of subclinical hypothyroidism  I reassured the patient that he does not need treatment for it at this time since he is asymptomatic and since his TSH is below 10  All of his other lab work looked very good  He will continue with his current medications  We will see him back as scheduled

## 2020-06-17 NOTE — TELEPHONE ENCOUNTER
Patient's Name: Brisa Perez  MRN: <[id number]>  YOB: 1966  Date of Visit: 6/17/2020  Primary Care Provider: REGINA Guadarrama CNP  Referring Provider: No ref. provider found    Subjective:     Chief Complaint   Patient presents with    Follow-up     itchy, flaky and burning of scalp     Dermatitis     pt has antisynthetase syndrome and polymyositis       History of Present Illness: Patient is a new patient to me, previously evaluated by Dr. Maryanne Lilly. Patient presents for follow-up of scalp issues. Patient was last evaluated on 2/19/20. At their last visit they were prescribed clobetasol cream 0.05% to her body and Tac 0.025% oint to face. Patient reports her rash on her thighs, chest and scalp have not improved    She has been using clobetasol ointment every other day, which her  applies on her scalp. Signs and symptoms: itching, burning and flaking  Location: scalp  Severity: Severity now is 10/10. Overall since last visit severity was 10/10. Modifying factors: Things that make this condition worse are unsure. Things that make this condition better are topical steroids (for a few hours). Duration: This condition has gone on for several years. Current treatment: clobetasol cream  Previous treatment: similar to current    Patient is known to have polymyositis/ anti-synthetase syndrome with polymyositis. She is followed closely by rheumatology. Currently on Cellcept 1g twice daily, Azathioprine 50 mg once daily and Prednisone 10 mg daily. She reports improvement of her muscle strength  She reports being very vigilant with sun exposure, however at times she doesn't because she feels she does better when exposed to the sun. denies fatigue, poor appetite, recent infections, fevers, night sweats, or unintentional weight loss  GI: denies   nausea, vomiting, diarrhea, abdominal pain.   Skin: denies new rash or mouth sores      Past medical/surgical/family history reviewed Spouse informed 11/12/18 alk with no changes since last visit on 2/19/20    Past Medical History:  Past Medical History:   Diagnosis Date    Allergic rhinitis     Arthritis     Asthma     Chronic sinusitis     Dermatitis     Hypothyroidism     Polymyositis ([address]        Past Surgical History:  Past Surgical History:   Procedure Laterality Date    CARPAL TUNNEL RELEASE Right 9/11/2019    RIGHT CARPAL TUNNEL RELEASE performed by Ryne Cheema MD at [facility] Left 12/27/2019    LEFT CARPAL TUNNEL RELEASE performed by Ryne Cheema MD at [address] [address]    SKIN BIOPSY      TONSILLECTOMY      WISDOM TOOTH EXTRACTION         Past Family History:  Family History   Problem Relation Age of Onset    Heart Disease Mother         afib    Stroke Mother     Atrial Fibrillation Mother     COPD Mother     Diabetes Father          Allergies: Allergies   Allergen Reactions    Wasp Venom Other (See Comments)       Current Medications:  Current Outpatient Medications   Medication Sig Dispense Refill    levothyroxine (SYNTHROID) 100 MCG tablet 1 tab daily 90 tablet 0    mycophenolate (CELLCEPT) 500 MG tablet Take 2 tab po  tablet 3    clobetasol (TEMOVATE) 0.05 % ointment Apply to affected area twice daily on body and hands 60 g 2    triamcinolone (KENALOG) 0.025 % cream Apply to affected area on face twice daily 80 g 2    azaTHIOprine (IMURAN) 50 MG tablet Take 3 tab po daily. (Patient taking differently: daily ) 90 tablet 2    ibuprofen (ADVIL;MOTRIN) 600 MG tablet Take 1 tablet by mouth every 6 hours as needed for Pain 60 tablet 1    PARoxetine (PAXIL) 10 MG tablet Take 1 tablet by mouth every morning 90 tablet 1    ALBUTEROL SULFATE IN Inhale into the lungs as needed      predniSONE (DELTASONE) 10 MG tablet 2 tab po daily x 15 days. 1.5 tab po daily x 15 days,thereafter take 1 tab po daily.  (Patient taking differently: 5 mg daily ) 120 tablet 1     No current facility-administered medications for this visit. Review of Systems:  Constitutional: No fevers, chills or recent illness. feels well   Skin: Skin:As per HPI AND otherwise no new, bleeding or symptomatic skin lesions      Objective:   Physical Examination:  General: alert, comfortable, no apparent distress, well-appearing  Psych: alert, oriented and pleasant  Neuro: oriented to person, place, and time  Skin: Areas examined: head including face, lips, conjunctiva and lids, neck, hair/scalp, chest, including breasts and axilla, abdomen, back, buttocks, right upper extremity, left upper extremity, right lower extremity, left lower extremity, left hand, right hand, digits and nails, patient declined genital exam, teeth and gums and oropharynx including mucosa      All areas examined were within normal limits except those listed below with the appropriate assessment and plan    Lab results: 5/14    Aldolase 12. 5High   1.5 - 8.1 U/L     Total CK 441High   26 - 192 U/L     CRP 7.4High   0.0 - 5.1 mg/L     Sed Rate 10  0 - 30 mm/Hr     BIOPSY RESULTS:       Assessment and Plan (with relevant objective exam findings):     1. Rash and scalp pruritis  Location/objective findings: Upper back, posterior neck, shoulders (shawl sign), anterior neck and upper chest (V sign)  with violaceous and poikilodermatous erythema. Lateral hips and thighs with scaly violaceous and poikilodermatous plaques.    -Scalp with diffuse erythematous scaly plaques extending onto the preauricular and mastoid process    - Hyperkeratosis, scaling, fissuring on the radial aspect of thumbs and ulnar aspect of fingers. Erythema and scaling of distal phalanges    - No oral lesions noted on exam    Despite the biopsy results showing dermatitis, the clinical morphology of her rash is more consistent with DM. The scalp symptom of burning sensation is more commonly seen with DM than with psoriasis.     It seems that her myositis is controled with current

## 2021-02-10 ENCOUNTER — OFFICE VISIT (OUTPATIENT)
Dept: FAMILY MEDICINE CLINIC | Facility: CLINIC | Age: 61
End: 2021-02-10
Payer: MEDICARE

## 2021-02-10 VITALS
BODY MASS INDEX: 33.53 KG/M2 | WEIGHT: 226.4 LBS | TEMPERATURE: 97.3 F | HEIGHT: 69 IN | OXYGEN SATURATION: 98 % | SYSTOLIC BLOOD PRESSURE: 120 MMHG | DIASTOLIC BLOOD PRESSURE: 84 MMHG | HEART RATE: 72 BPM

## 2021-02-10 DIAGNOSIS — I10 ESSENTIAL HYPERTENSION: ICD-10-CM

## 2021-02-10 DIAGNOSIS — Z23 NEED FOR VACCINATION: ICD-10-CM

## 2021-02-10 DIAGNOSIS — R19.5 CHANGE IN STOOL CALIBER: ICD-10-CM

## 2021-02-10 DIAGNOSIS — Z00.00 MEDICARE ANNUAL WELLNESS VISIT, SUBSEQUENT: Primary | ICD-10-CM

## 2021-02-10 DIAGNOSIS — Z12.5 SCREENING FOR PROSTATE CANCER: ICD-10-CM

## 2021-02-10 DIAGNOSIS — N40.0 BENIGN PROSTATIC HYPERPLASIA WITHOUT LOWER URINARY TRACT SYMPTOMS: ICD-10-CM

## 2021-02-10 DIAGNOSIS — E03.8 SUBCLINICAL HYPOTHYROIDISM: ICD-10-CM

## 2021-02-10 DIAGNOSIS — E78.2 MIXED HYPERLIPIDEMIA: ICD-10-CM

## 2021-02-10 DIAGNOSIS — K59.09 CHRONIC CONSTIPATION: ICD-10-CM

## 2021-02-10 PROCEDURE — G0439 PPPS, SUBSEQ VISIT: HCPCS | Performed by: FAMILY MEDICINE

## 2021-02-10 RX ORDER — ZOSTER VACCINE RECOMBINANT, ADJUVANTED 50 MCG/0.5
KIT INTRAMUSCULAR
Qty: 1 EACH | Refills: 1 | Status: SHIPPED | OUTPATIENT
Start: 2021-02-10 | End: 2022-02-11 | Stop reason: ALTCHOICE

## 2021-02-10 NOTE — PROGRESS NOTES
Assessment and Plan:     Problem List Items Addressed This Visit        Endocrine    Subclinical hypothyroidism    Relevant Orders    T4, free    TSH, 3rd generation       Cardiovascular and Mediastinum    Hypertension    Relevant Orders    CBC and differential    Comprehensive metabolic panel    Lipid panel    LDL cholesterol, direct    T4, free    TSH, 3rd generation       Genitourinary    Benign prostatic hyperplasia    Relevant Orders    PSA, ultrasensitive       Other    Hyperlipidemia    Relevant Orders    Comprehensive metabolic panel    Lipid panel    LDL cholesterol, direct      Other Visit Diagnoses     Medicare annual wellness visit, subsequent    -  Primary    Relevant Orders    Ambulatory referral to Gastroenterology    CBC and differential    Comprehensive metabolic panel    Lipid panel    LDL cholesterol, direct    T4, free    TSH, 3rd generation    PSA, ultrasensitive    Change in stool caliber        Relevant Orders    Ambulatory referral to Gastroenterology    Chronic constipation        Relevant Orders    Ambulatory referral to Gastroenterology    Screening for prostate cancer        Relevant Orders    PSA, ultrasensitive    Need for vaccination        Relevant Medications    Zoster Vac Recomb Adjuvanted (Shingrix) 50 MCG/0 5ML SUSR       The patient had a normal exam today in the office  He is stable on his current medications  He will go for the up-to-date testing as ordered  He has noticed a change in his stool caliber and is having increased constipation -he is overdue for colonoscopy and will schedule one as soon as possible  We will monitor him closely  We will contact him with the results of his labs when available  He will continue with his current medications and will continue follow-up with his psychiatrist as scheduled  BMI Counseling: Body mass index is 33 14 kg/m²   The BMI is above normal  Nutrition recommendations include decreasing portion sizes, encouraging healthy choices of fruits and vegetables, decreasing fast food intake, consuming healthier snacks, limiting drinks that contain sugar, moderation in carbohydrate intake, increasing intake of lean protein, reducing intake of saturated and trans fat and reducing intake of cholesterol  Exercise recommendations include exercising 3-5 times per week and strength training exercises  No pharmacotherapy was ordered  Patient referred to PCP due to patient being overweight  Preventive health issues were discussed with patient, and age appropriate screening tests were ordered as noted in patient's After Visit Summary  Personalized health advice and appropriate referrals for health education or preventive services given if needed, as noted in patient's After Visit Summary  Chief Complaint   Patient presents with    Medicare Wellness Visit     Subsequent Medicare Wellness exam 63year old        History of Present Illness:     Patient presents for a Subsequent Medicare wellness visit  Trudy Burkitt is a 63 y o  male who presents today for a subsequent Medicare wellness visit  he  has been feeling well and has no complaints today  The patient denies any chest pain, shortness of breath, or palpitations  There is no edema  There are no headaches or visual changes  There is no lightheadedness, dizziness, or fainting spells  There are no GI symptoms  The patient goes for dental exams every 6 months and sees his eye doctor  The patient is watching his diet and follows a regular exercise program    He sees his Cardiologist and Psychiatrist regularly  He is feeling well  He is watching his diet  He tries to exercise  He has had some difficulty moving his bowels and he has had increased gas  His stool has changed in caliber and is in clumps  He has had this over a year        Patient Care Team:  Ankush Bains DO as PCP - General (Family Medicine)  Constance Govea MD (Psychiatry)  Marbella Delong MD (Cardiology) Review of Systems:     Review of Systems   Constitutional: Negative  HENT: Negative  Eyes: Negative  Respiratory: Negative  Cardiovascular: Negative  Gastrointestinal: Negative  Endocrine: Negative  Genitourinary: Negative  Musculoskeletal: Negative  Skin: Negative  Allergic/Immunologic: Negative  Neurological: Negative  Hematological: Negative  Psychiatric/Behavioral: Negative  Problem List:     Patient Active Problem List   Diagnosis    [facility]    Benign prostatic hyperplasia    Cataract of both eyes    Hyperlipidemia    Hypertension    Obesity    Obstructive sleep apnea    Osteoarthritis    [facility]    [facility] schizophrenia ([facility] )    Stasis dermatitis    Venous insufficiency    Subclinical hypothyroidism      Past Medical and Surgical History:     History reviewed  No pertinent past medical history    Past Surgical History:   Procedure Laterality Date    TOTAL HIP ARTHROPLASTY Left 08/2006    TOTAL KNEE ARTHROPLASTY Right 09/17/2011      Family History:     Family History   Problem Relation Age of Onset    Hypertension Mother     Stroke Family         syndrome       Social History:        Social History     Socioeconomic History    Marital status: /Civil Union     Spouse name: None    Number of children: None    Years of education: None    Highest education level: None   Occupational History    None   Social Needs    Financial resource strain: None    Food insecurity     Worry: None     Inability: None    Transportation needs     Medical: None     Non-medical: None   Tobacco Use    Smoking status: Never Smoker    Smokeless tobacco: Never Used   Substance and Sexual Activity    Alcohol use: No    Drug use: No    Sexual activity: None   Lifestyle    Physical activity     Days per week: 2 days     Minutes per session: 20 min    Stress: Not at all   Relationships    Social connections     Talks on phone: None     Gets together: None     Attends Congregation service: None     Active member of club or organization: None     Attends meetings of clubs or organizations: None     Relationship status:     Intimate partner violence     Fear of current or ex partner: No     Emotionally abused: No     Physically abused: No     Forced sexual activity: No   Other Topics Concern    None   Social History Narrative    Physical disability       Medications and Allergies:     Current Outpatient Medications   Medication Sig Dispense Refill    aspirin 81 MG tablet Take 1 tablet by mouth daily      divalproex sodium (DEPAKOTE) 250 mg EC tablet Take 250 mg by mouth every other day      divalproex sodium (DEPAKOTE) 500 mg EC tablet Take 500 mg by mouth 2 (two) times a day        furosemide (LASIX) 20 mg tablet Take 2 tablets by mouth daily       gabapentin (NEURONTIN) 600 MG tablet Take 600 mg by mouth 2 (two) times a day        metoprolol succinate (TOPROL-XL) 50 mg 24 hr tablet Take 50 mg by mouth 2 (two) times a day      OLANZapine (ZYPREXA) 10 mg tablet Take 10 mg by mouth every evening       OLANZapine (ZyPREXA) 20 MG tablet Once daily to take with a 10mg tab      Zoster Vac Recomb Adjuvanted (Shingrix) 50 MCG/0 5ML SUSR Administer 1 dose intramuscularly now and repeat for a 2nd dose in 2-6 months 1 each 1     No current facility-administered medications for this visit  Allergies   Allergen Reactions    Haloperidol      Neuroleptic Malignant Syndrome          Immunizations:     Immunization History   Administered Date(s) Administered    INFLUENZA 11/02/2013, 11/04/2016, 09/23/2017, 10/13/2018, 08/12/2020    Influenza Quadrivalent 3 years and older 08/12/2020    Influenza, injectable, quadrivalent, preservative free 0 5 mL 10/16/2019    Influenza, seasonal, injectable 1959, 11/10/2012, 11/04/2016, 09/23/2017    Pneumococcal Polysaccharide PPV23 11/12/2013    Td (adult), adsorbed 1959    Tdap 08/22/2019      Health Maintenance:         Topic Date Due    Colorectal Cancer Screening  01/09/2014    HIV Screening  Completed    Hepatitis C Screening  Completed     There are no preventive care reminders to display for this patient  Medicare Screening Tests and Risk Assessments:     Sara Infante is here for his Initial Wellness visit  Last Medicare Wellness visit information reviewed, patient interviewed and updates made to the record today  Health Risk Assessment:   Patient rates overall health as very good  Patient feels that their physical health rating is slightly better  Eyesight was rated as same  Hearing was rated as same  Patient feels that their emotional and mental health rating is same  Pain experienced in the last 7 days has been some  Patient's pain rating has been 2/10  Patient states that he has experienced no weight loss or gain in last 6 months  Occasional achiness  Depression Screening:   PHQ-2 Score: 0      Fall Risk Screening: In the past year, patient has experienced: no history of falling in past year      Home Safety:  Patient does not have trouble with stairs inside or outside of their home  Patient has working smoke alarms and has working carbon monoxide detector  Home safety hazards include: none  Nutrition:   Current diet is Regular, Limited junk food and No Added Salt  Medications:   Patient is not currently taking any over-the-counter supplements  Patient is able to manage medications  Activities of Daily Living (ADLs)/Instrumental Activities of Daily Living (IADLs):   Walk and transfer into and out of bed and chair?: Yes  Dress and groom yourself?: Yes    Bathe or shower yourself?: Yes    Feed yourself? Yes  Do your laundry/housekeeping?: Yes  Manage your money, pay your bills and track your expenses?: Yes  Make your own meals?: No    Do your own shopping?: No    ADL comments: His wife does the cooking        Previous Hospitalizations:   Any hospitalizations or ED visits within the last 12 months?: No      Advance Care Planning:   Living will: No    Durable POA for healthcare: No    Advanced directive: No    Advanced directive counseling given: Yes    Five wishes given: No    Patient declined ACP directive: No    End of Life Decisions reviewed with patient: Yes    Provider agrees with end of life decisions: Yes      Comments: He is looking into it  PREVENTIVE SCREENINGS      Cardiovascular Screening:    General: Risks and Benefits Discussed and Screening Current      Diabetes Screening:     General: Screening Current and Risks and Benefits Discussed      Colorectal Cancer Screening:     General: Risks and Benefits Discussed    Due for: Colonoscopy - Low Risk      Prostate Cancer Screening:    General: Risks and Benefits Discussed and Screening Current      Osteoporosis Screening:    General: Risks and Benefits Discussed and Screening Current      Abdominal Aortic Aneurysm (AAA) Screening:        General: Screening Not Indicated      Lung Cancer Screening:     General: Screening Not Indicated      Hepatitis C Screening:    General: Screening Current    Other Counseling Topics:   Alcohol use counseling, car/seat belt/driving safety, skin self-exam, sunscreen and calcium and vitamin D intake and regular weightbearing exercise  Visual Acuity Screening    Right eye Left eye Both eyes   Without correction: 20/25 20/20    With correction:           Physical Exam:     /84 (BP Location: Left arm, Patient Position: Sitting, Cuff Size: Large)   Pulse 72   Temp (!) 97 3 °F (36 3 °C) (Tympanic)   Ht 5' 9 3" (1 76 m)   Wt 103 kg (226 lb 6 4 oz)   SpO2 98%   BMI 33 14 kg/m²     Physical Exam  Vitals signs and nursing note reviewed  Constitutional:       Appearance: He is well-developed  HENT:      Head: Normocephalic and atraumatic        Right Ear: External ear normal       Left Ear: External ear normal       Nose: Nose normal       Mouth/Throat: Pharynx: No oropharyngeal exudate  Eyes:      Conjunctiva/sclera: Conjunctivae normal       Pupils: Pupils are equal, round, and reactive to light  Neck:      Musculoskeletal: Normal range of motion and neck supple  Thyroid: No thyromegaly  Trachea: No tracheal deviation  Cardiovascular:      Rate and Rhythm: Normal rate and regular rhythm  Heart sounds: Normal heart sounds  No murmur  No friction rub  No gallop  Pulmonary:      Effort: Pulmonary effort is normal  No respiratory distress  Breath sounds: Normal breath sounds  No stridor  No wheezing or rales  Chest:      Chest wall: No tenderness  Abdominal:      General: Bowel sounds are normal  There is no distension  Palpations: Abdomen is soft  There is no mass  Tenderness: There is no abdominal tenderness  There is no guarding or rebound  Hernia: No hernia is present  Genitourinary:     Penis: No tenderness  Musculoskeletal: Normal range of motion  General: No tenderness or deformity  Lymphadenopathy:      Cervical: No cervical adenopathy  Skin:     General: Skin is warm and dry  Coloration: Skin is not pale  Findings: No erythema or rash  Neurological:      Mental Status: He is alert and oriented to person, place, and time  Cranial Nerves: No cranial nerve deficit  Sensory: No sensory deficit  Motor: No abnormal muscle tone  Coordination: Coordination normal       Deep Tendon Reflexes: Reflexes normal    Psychiatric:         Behavior: Behavior normal          Thought Content:  Thought content normal          Judgment: Judgment normal           Michael Barcenas DO

## 2021-02-10 NOTE — PATIENT INSTRUCTIONS

## 2021-02-15 ENCOUNTER — LAB (OUTPATIENT)
Dept: LAB | Facility: CLINIC | Age: 61
End: 2021-02-15
Payer: MEDICARE

## 2021-02-15 DIAGNOSIS — E03.8 SUBCLINICAL HYPOTHYROIDISM: ICD-10-CM

## 2021-02-15 DIAGNOSIS — I10 ESSENTIAL HYPERTENSION: ICD-10-CM

## 2021-02-15 DIAGNOSIS — Z12.5 SCREENING FOR PROSTATE CANCER: ICD-10-CM

## 2021-02-15 DIAGNOSIS — Z00.00 MEDICARE ANNUAL WELLNESS VISIT, SUBSEQUENT: ICD-10-CM

## 2021-02-15 DIAGNOSIS — N40.0 BENIGN PROSTATIC HYPERPLASIA WITHOUT LOWER URINARY TRACT SYMPTOMS: ICD-10-CM

## 2021-02-15 DIAGNOSIS — E78.2 MIXED HYPERLIPIDEMIA: ICD-10-CM

## 2021-02-15 LAB
ALBUMIN SERPL BCP-MCNC: 4.2 G/DL (ref 3.5–5)
ALP SERPL-CCNC: 54 U/L (ref 46–116)
ALT SERPL W P-5'-P-CCNC: 33 U/L (ref 12–78)
ANION GAP SERPL CALCULATED.3IONS-SCNC: 4 MMOL/L (ref 4–13)
AST SERPL W P-5'-P-CCNC: 28 U/L (ref 5–45)
BASOPHILS # BLD AUTO: 0.08 THOUSANDS/ΜL (ref 0–0.1)
BASOPHILS NFR BLD AUTO: 1 % (ref 0–1)
BILIRUB SERPL-MCNC: 0.82 MG/DL (ref 0.2–1)
BUN SERPL-MCNC: 21 MG/DL (ref 5–25)
CALCIUM SERPL-MCNC: 9.1 MG/DL (ref 8.3–10.1)
CHLORIDE SERPL-SCNC: 104 MMOL/L (ref 100–108)
CHOLEST SERPL-MCNC: 152 MG/DL (ref 50–200)
CO2 SERPL-SCNC: 32 MMOL/L (ref 21–32)
CREAT SERPL-MCNC: 1.24 MG/DL (ref 0.6–1.3)
EOSINOPHIL # BLD AUTO: 0.28 THOUSAND/ΜL (ref 0–0.61)
EOSINOPHIL NFR BLD AUTO: 3 % (ref 0–6)
ERYTHROCYTE [DISTWIDTH] IN BLOOD BY AUTOMATED COUNT: 12.3 % (ref 11.6–15.1)
GFR SERPL CREATININE-BSD FRML MDRD: 62 ML/MIN/1.73SQ M
GLUCOSE P FAST SERPL-MCNC: 94 MG/DL (ref 65–99)
HCT VFR BLD AUTO: 43.6 % (ref 36.5–49.3)
HDLC SERPL-MCNC: 38 MG/DL
HGB BLD-MCNC: 14.1 G/DL (ref 12–17)
IMM GRANULOCYTES # BLD AUTO: 0.05 THOUSAND/UL (ref 0–0.2)
IMM GRANULOCYTES NFR BLD AUTO: 1 % (ref 0–2)
LDLC SERPL CALC-MCNC: 67 MG/DL (ref 0–100)
LDLC SERPL DIRECT ASSAY-MCNC: 84 MG/DL (ref 0–100)
LYMPHOCYTES # BLD AUTO: 2.43 THOUSANDS/ΜL (ref 0.6–4.47)
LYMPHOCYTES NFR BLD AUTO: 28 % (ref 14–44)
MCH RBC QN AUTO: 29.2 PG (ref 26.8–34.3)
MCHC RBC AUTO-ENTMCNC: 32.3 G/DL (ref 31.4–37.4)
MCV RBC AUTO: 90 FL (ref 82–98)
MONOCYTES # BLD AUTO: 0.97 THOUSAND/ΜL (ref 0.17–1.22)
MONOCYTES NFR BLD AUTO: 11 % (ref 4–12)
NEUTROPHILS # BLD AUTO: 4.85 THOUSANDS/ΜL (ref 1.85–7.62)
NEUTS SEG NFR BLD AUTO: 56 % (ref 43–75)
NONHDLC SERPL-MCNC: 114 MG/DL
NRBC BLD AUTO-RTO: 0 /100 WBCS
PLATELET # BLD AUTO: 235 THOUSANDS/UL (ref 149–390)
PMV BLD AUTO: 9.8 FL (ref 8.9–12.7)
POTASSIUM SERPL-SCNC: 4.4 MMOL/L (ref 3.5–5.3)
PROT SERPL-MCNC: 7.5 G/DL (ref 6.4–8.2)
RBC # BLD AUTO: 4.83 MILLION/UL (ref 3.88–5.62)
SODIUM SERPL-SCNC: 140 MMOL/L (ref 136–145)
T4 FREE SERPL-MCNC: 0.98 NG/DL (ref 0.76–1.46)
TRIGL SERPL-MCNC: 237 MG/DL
TSH SERPL DL<=0.05 MIU/L-ACNC: 4.68 UIU/ML (ref 0.36–3.74)
WBC # BLD AUTO: 8.66 THOUSAND/UL (ref 4.31–10.16)

## 2021-02-15 PROCEDURE — 84153 ASSAY OF PSA TOTAL: CPT

## 2021-02-15 PROCEDURE — 80061 LIPID PANEL: CPT

## 2021-02-15 PROCEDURE — 85025 COMPLETE CBC W/AUTO DIFF WBC: CPT

## 2021-02-15 PROCEDURE — 84439 ASSAY OF FREE THYROXINE: CPT

## 2021-02-15 PROCEDURE — 36415 COLL VENOUS BLD VENIPUNCTURE: CPT

## 2021-02-15 PROCEDURE — 80053 COMPREHEN METABOLIC PANEL: CPT

## 2021-02-15 PROCEDURE — 83721 ASSAY OF BLOOD LIPOPROTEIN: CPT

## 2021-02-15 PROCEDURE — 84443 ASSAY THYROID STIM HORMONE: CPT

## 2021-02-16 LAB — PSA SERPL DL<=0.01 NG/ML-MCNC: 2.14 NG/ML (ref 0–4)

## 2021-02-17 ENCOUNTER — OFFICE VISIT (OUTPATIENT)
Dept: GASTROENTEROLOGY | Facility: CLINIC | Age: 61
End: 2021-02-17
Payer: MEDICARE

## 2021-02-17 ENCOUNTER — TELEPHONE (OUTPATIENT)
Dept: GASTROENTEROLOGY | Facility: CLINIC | Age: 61
End: 2021-02-17

## 2021-02-17 VITALS
SYSTOLIC BLOOD PRESSURE: 136 MMHG | HEART RATE: 74 BPM | HEIGHT: 69 IN | BODY MASS INDEX: 33.77 KG/M2 | WEIGHT: 228 LBS | DIASTOLIC BLOOD PRESSURE: 86 MMHG

## 2021-02-17 DIAGNOSIS — K59.09 OTHER CONSTIPATION: Primary | ICD-10-CM

## 2021-02-17 DIAGNOSIS — Z12.11 SCREENING FOR COLON CANCER: ICD-10-CM

## 2021-02-17 PROCEDURE — 99203 OFFICE O/P NEW LOW 30 MIN: CPT | Performed by: INTERNAL MEDICINE

## 2021-02-17 RX ORDER — LINACLOTIDE 72 UG/1
72 CAPSULE, GELATIN COATED ORAL DAILY
Qty: 30 CAPSULE | Refills: 5 | Status: SHIPPED | OUTPATIENT
Start: 2021-02-17 | End: 2021-08-10

## 2021-02-17 NOTE — PROGRESS NOTES
[id number] - Outpatient Consultation  Suleiman Mckeon 63 y o  male MRN: [id number]  Encounter: [id number]    ASSESSMENT AND PLAN:      1  Other constipation    57-year-old male with worsening constipation  Probably a combination of anti cholinergic side effects of some of his medications as well as age  I would like to schedule him for colonoscopy to exclude a structural cause I have placed him on Linzess in the interim  Further recommendations await the results of the colonoscopy  - linaCLOtide (Linzess) 72 MCG CAPS; Take 72 mcg by mouth daily  Dispense: 30 capsule; Refill: 5    2  Screening for colon cancer    Colonoscopy scheduled      Followup Appointment:  2 months  ______________________________________________________________________    Chief Complaint   Patient presents with    Constipation     Has never had a colonoscopy  HPI:   Suleiman Mckeon is a 63y o  year old male Who presents with worsening constipation over the last 1-2 years  He reports his stools become much less frequent perhaps every 3-4 days  No rectal bleeding no abdominal pain  No upper tract symptoms  He started taking Metamucil every day which she thinks is helping a little bit  No significant weight loss  No abdominal pain      Historical Information   Past Medical History:   Diagnosis Date    [facility]     Hypertension     Parkinson[facility]     Schizophrenia ([facility] )      Past Surgical History:   Procedure Laterality Date    TOTAL HIP ARTHROPLASTY Left 08/2006    TOTAL KNEE ARTHROPLASTY Right 09/17/2011     Social History     Substance and Sexual Activity   Alcohol Use No     Social History     Substance and Sexual Activity   Drug Use No     Social History     Tobacco Use   Smoking Status Never Smoker   Smokeless Tobacco Never Used     Family History   Problem Relation Age of Onset    Hypertension Mother     Stroke Family         syndrome     Cancer Father  Colon polyps Sister     Colon cancer Neg Hx        Meds/Allergies     Current Outpatient Medications:     aspirin 81 MG tablet    divalproex sodium (DEPAKOTE) 250 mg EC tablet    divalproex sodium (DEPAKOTE) 500 mg EC tablet    furosemide (LASIX) 20 mg tablet    gabapentin (NEURONTIN) 600 MG tablet    metoprolol succinate (TOPROL-XL) 50 mg 24 hr tablet    OLANZapine (ZYPREXA) 10 mg tablet    OLANZapine (ZyPREXA) 20 MG tablet    linaCLOtide (Linzess) 72 MCG CAPS    Zoster Vac Recomb Adjuvanted (Shingrix) 50 MCG/0 5ML SUSR    Allergies   Allergen Reactions    Haloperidol      Neuroleptic Malignant Syndrome  PHYSICAL EXAM:    Blood pressure 136/86, pulse 74, height 5' 9" (1 753 m), weight 103 kg (228 lb)  Body mass index is 33 67 kg/m²  General Appearance: NAD, cooperative, alert  Eyes: Anicteric, PERRLA, EOMI  ENT:  Normocephalic, atraumatic, normal mucosa  Neck:  Supple, symmetrical, trachea midline,   Resp:  Clear to auscultation bilaterally; no rales, rhonchi or wheezing; respirations unlabored   CV:  S1 S2, Regular rate and rhythm; no murmur, rub, or gallop  GI:  Soft, non-tender, non-distended; normal bowel sounds; no masses, no organomegaly   Rectal: Deferred  Musculoskeletal: No cyanosis, clubbing or edema  Normal ROM    Skin:  No jaundice, rashes, or lesions   Heme/Lymph: No palpable cervical lymphadenopathy  Psych: Normal affect, good eye contact  Neuro: No gross deficits, AAOx3    Lab Results:   Lab Results   Component Value Date    WBC 8 66 02/15/2021    HGB 14 1 02/15/2021    HCT 43 6 02/15/2021    MCV 90 02/15/2021     02/15/2021     Lab Results   Component Value Date    K 4 4 02/15/2021     02/15/2021    CO2 32 02/15/2021    BUN 21 02/15/2021    CREATININE 1 24 02/15/2021    GLUF 94 02/15/2021    CALCIUM 9 1 02/15/2021    AST 28 02/15/2021    ALT 33 02/15/2021    ALKPHOS 54 02/15/2021    PROT 7 5 03/14/2015    BILITOT 0 53 03/14/2015    EGFR 62 02/15/2021     No results found for: IRON, TIBC, FERRITIN  No results found for: LIPASE    Radiology Results:   No results found  REVIEW OF SYSTEMS:    CONSTITUTIONAL: Denies any fever, chills, rigors, and weight loss  HEENT: No earache or tinnitus  Denies hearing loss or visual disturbances  CARDIOVASCULAR: No chest pain or palpitations  RESPIRATORY: Denies any cough, hemoptysis, shortness of breath or dyspnea on exertion  GASTROINTESTINAL: As noted in the History of Present Illness  GENITOURINARY: No problems with urination  Denies any hematuria or dysuria  NEUROLOGIC: No dizziness or vertigo, denies headaches  MUSCULOSKELETAL: Denies any muscle or joint pain  SKIN: Denies skin rashes or itching  ENDOCRINE: Denies excessive thirst  Denies intolerance to heat or cold  PSYCHOSOCIAL: Denies depression or anxiety  Denies any recent memory loss

## 2021-03-15 NOTE — TELEPHONE ENCOUNTER
1st call-lvm for pt to sched the colon dr Cruz Pulling ordered from last ov-pt stated he would call but has not

## 2021-03-29 DIAGNOSIS — Z23 ENCOUNTER FOR IMMUNIZATION: ICD-10-CM

## 2021-04-12 VITALS — HEIGHT: 69 IN | BODY MASS INDEX: 33.77 KG/M2 | WEIGHT: 228 LBS

## 2021-04-12 NOTE — TELEPHONE ENCOUNTER
Why does your doctor want you to have this procedure? Screening colon     Do you have kidney disease?  no  If yes, are you on dialysis :     Have you had diverticulitis within the past 2 months? no    Are you diabetic?  no  If yes, insulin dependent: If yes, provide diabetic instructions sheet     Do take iron supplements?  no  If yes, instruct patient to hold iron supplement for 7 days prior    Are you on a blood thinner? no   Was the blood thinner sheet complete and faxed to cardiologist no  Plavix (clopidogrel), Coumadin (warfarin), Lovenox (enoxaparin), Xarelto (rivaroxaban), Pradaxa(dabigatran), Eliquis(apixaban) Savaysa/Lixiana (edoxapan)    Do you have an automatic implantable cardiac defibrillator (AICD)/pacemaker ([facility])? no  Was AICD/pacemaker sheet completed and faxed to cardiologist? no    Are you on home oxygen? no  If yes, continuous or nocturnal:     Have you been treated for MRSA, VRE or any communicable diseases? no    Heart attack, stroke, or stent within 3 months? no  Schedule at Hospital if within 3-6 months   Use nitroglycerin for chest pain in the last 6 months? no    History of organ  transplant?  no   If yes, notify Endo      History of neck/throat/tongue surgery or cancer? no  IF yes, notify Endo      Any problems with anesthesia in the past? no     Was stool C diff ordered?  no Stool specimen needs to be completed prior to procedure    Do have any facial or body piercings?no     Do you have a latex allergy? no     Do have an allergy to metals? (Bravo study only) no     If pediatric patient, was consent faxed to pediatrician no     Patient rights reviewed yes     Miralax prep given and instructions emailed to patient  I have reviewed the provider's instructions with the patient, answering all questions to his satisfaction

## 2021-04-27 ENCOUNTER — TRANSCRIBE ORDERS (OUTPATIENT)
Dept: ADMINISTRATIVE | Facility: HOSPITAL | Age: 61
End: 2021-04-27

## 2021-04-27 ENCOUNTER — APPOINTMENT (OUTPATIENT)
Dept: LAB | Facility: HOSPITAL | Age: 61
End: 2021-04-27
Payer: MEDICARE

## 2021-04-27 DIAGNOSIS — G93.41 METABOLIC ENCEPHALOPATHY: ICD-10-CM

## 2021-04-27 DIAGNOSIS — G25.89 ORGANIC WRITERS' CRAMP: Primary | ICD-10-CM

## 2021-04-27 DIAGNOSIS — G25.89 ORGANIC WRITERS' CRAMP: ICD-10-CM

## 2021-04-27 LAB
ALBUMIN SERPL BCP-MCNC: 4 G/DL (ref 3.5–5)
ALP SERPL-CCNC: 66 U/L (ref 46–116)
ALT SERPL W P-5'-P-CCNC: 29 U/L (ref 12–78)
AMMONIA PLAS-SCNC: 18 UMOL/L (ref 11–35)
AMYLASE SERPL-CCNC: 74 IU/L (ref 25–115)
ANION GAP SERPL CALCULATED.3IONS-SCNC: 10 MMOL/L (ref 4–13)
AST SERPL W P-5'-P-CCNC: 21 U/L (ref 5–45)
BASOPHILS # BLD AUTO: 0.06 THOUSANDS/ΜL (ref 0–0.1)
BASOPHILS NFR BLD AUTO: 1 % (ref 0–1)
BILIRUB SERPL-MCNC: 0.6 MG/DL (ref 0.2–1)
BUN SERPL-MCNC: 17 MG/DL (ref 5–25)
CALCIUM SERPL-MCNC: 9 MG/DL (ref 8.3–10.1)
CHLORIDE SERPL-SCNC: 106 MMOL/L (ref 100–108)
CO2 SERPL-SCNC: 27 MMOL/L (ref 21–32)
CREAT SERPL-MCNC: 1.12 MG/DL (ref 0.6–1.3)
EOSINOPHIL # BLD AUTO: 0.32 THOUSAND/ΜL (ref 0–0.61)
EOSINOPHIL NFR BLD AUTO: 6 % (ref 0–6)
ERYTHROCYTE [DISTWIDTH] IN BLOOD BY AUTOMATED COUNT: 12.3 % (ref 11.6–15.1)
ERYTHROCYTE [SEDIMENTATION RATE] IN BLOOD: 6 MM/HOUR (ref 0–19)
FOLATE SERPL-MCNC: 11.8 NG/ML (ref 3.1–17.5)
GFR SERPL CREATININE-BSD FRML MDRD: 71 ML/MIN/1.73SQ M
GLUCOSE SERPL-MCNC: 88 MG/DL (ref 65–140)
HCT VFR BLD AUTO: 42.6 % (ref 36.5–49.3)
HGB BLD-MCNC: 14.1 G/DL (ref 12–17)
IMM GRANULOCYTES # BLD AUTO: 0.04 THOUSAND/UL (ref 0–0.2)
IMM GRANULOCYTES NFR BLD AUTO: 1 % (ref 0–2)
LYMPHOCYTES # BLD AUTO: 1.63 THOUSANDS/ΜL (ref 0.6–4.47)
LYMPHOCYTES NFR BLD AUTO: 29 % (ref 14–44)
MCH RBC QN AUTO: 29.3 PG (ref 26.8–34.3)
MCHC RBC AUTO-ENTMCNC: 33.1 G/DL (ref 31.4–37.4)
MCV RBC AUTO: 88 FL (ref 82–98)
MONOCYTES # BLD AUTO: 0.53 THOUSAND/ΜL (ref 0.17–1.22)
MONOCYTES NFR BLD AUTO: 9 % (ref 4–12)
NEUTROPHILS # BLD AUTO: 3.06 THOUSANDS/ΜL (ref 1.85–7.62)
NEUTS SEG NFR BLD AUTO: 54 % (ref 43–75)
NRBC BLD AUTO-RTO: 0 /100 WBCS
PLATELET # BLD AUTO: 235 THOUSANDS/UL (ref 149–390)
PMV BLD AUTO: 9.5 FL (ref 8.9–12.7)
POTASSIUM SERPL-SCNC: 4.2 MMOL/L (ref 3.5–5.3)
PROT SERPL-MCNC: 7.4 G/DL (ref 6.4–8.2)
RBC # BLD AUTO: 4.82 MILLION/UL (ref 3.88–5.62)
SODIUM SERPL-SCNC: 143 MMOL/L (ref 136–145)
VALPROATE SERPL-MCNC: 81 UG/ML (ref 50–100)
VIT B12 SERPL-MCNC: 416 PG/ML (ref 100–900)
WBC # BLD AUTO: 5.64 THOUSAND/UL (ref 4.31–10.16)

## 2021-04-27 PROCEDURE — 36415 COLL VENOUS BLD VENIPUNCTURE: CPT

## 2021-04-27 PROCEDURE — 82607 VITAMIN B-12: CPT

## 2021-04-27 PROCEDURE — 85025 COMPLETE CBC W/AUTO DIFF WBC: CPT

## 2021-04-27 PROCEDURE — 82140 ASSAY OF AMMONIA: CPT

## 2021-04-27 PROCEDURE — 82746 ASSAY OF FOLIC ACID SERUM: CPT

## 2021-04-27 PROCEDURE — 82150 ASSAY OF AMYLASE: CPT

## 2021-04-27 PROCEDURE — 85652 RBC SED RATE AUTOMATED: CPT

## 2021-04-27 PROCEDURE — 80164 ASSAY DIPROPYLACETIC ACD TOT: CPT

## 2021-04-27 PROCEDURE — 80053 COMPREHEN METABOLIC PANEL: CPT

## 2021-05-05 ENCOUNTER — HOSPITAL ENCOUNTER (OUTPATIENT)
Dept: GASTROENTEROLOGY | Facility: AMBULATORY SURGERY CENTER | Age: 61
Discharge: HOME | End: 2021-05-05
Payer: MEDICARE

## 2021-05-05 ENCOUNTER — ANESTHESIA (OUTPATIENT)
Dept: GASTROENTEROLOGY | Facility: AMBULATORY SURGERY CENTER | Age: 61
End: 2021-05-05

## 2021-05-05 ENCOUNTER — ANESTHESIA EVENT (OUTPATIENT)
Dept: GASTROENTEROLOGY | Facility: AMBULATORY SURGERY CENTER | Age: 61
End: 2021-05-05

## 2021-05-05 VITALS
OXYGEN SATURATION: 97 % | SYSTOLIC BLOOD PRESSURE: 122 MMHG | BODY MASS INDEX: 32.78 KG/M2 | RESPIRATION RATE: 24 BRPM | HEART RATE: 64 BPM | TEMPERATURE: 99.3 F | DIASTOLIC BLOOD PRESSURE: 74 MMHG | HEIGHT: 70 IN | WEIGHT: 229 LBS

## 2021-05-05 DIAGNOSIS — K59.09 OTHER CONSTIPATION: ICD-10-CM

## 2021-05-05 PROCEDURE — 45385 COLONOSCOPY W/LESION REMOVAL: CPT | Performed by: INTERNAL MEDICINE

## 2021-05-05 PROCEDURE — 88305 TISSUE EXAM BY PATHOLOGIST: CPT | Performed by: PATHOLOGY

## 2021-05-05 RX ORDER — ONDANSETRON 2 MG/ML
4 INJECTION INTRAMUSCULAR; INTRAVENOUS ONCE AS NEEDED
Status: CANCELLED | OUTPATIENT
Start: 2021-05-05

## 2021-05-05 RX ORDER — PROPOFOL 10 MG/ML
INJECTION, EMULSION INTRAVENOUS AS NEEDED
Status: DISCONTINUED | OUTPATIENT
Start: 2021-05-05 | End: 2021-05-05

## 2021-05-05 RX ORDER — METOCLOPRAMIDE HYDROCHLORIDE 5 MG/ML
10 INJECTION INTRAMUSCULAR; INTRAVENOUS ONCE AS NEEDED
Status: CANCELLED | OUTPATIENT
Start: 2021-05-05

## 2021-05-05 RX ORDER — SODIUM CHLORIDE 9 MG/ML
50 INJECTION, SOLUTION INTRAVENOUS CONTINUOUS
Status: DISCONTINUED | OUTPATIENT
Start: 2021-05-05 | End: 2021-05-09 | Stop reason: HOSPADM

## 2021-05-05 RX ORDER — HYDRALAZINE HYDROCHLORIDE 20 MG/ML
5 INJECTION INTRAMUSCULAR; INTRAVENOUS
Status: CANCELLED | OUTPATIENT
Start: 2021-05-05

## 2021-05-05 RX ORDER — MEPERIDINE HYDROCHLORIDE 50 MG/ML
12.5 INJECTION INTRAMUSCULAR; INTRAVENOUS; SUBCUTANEOUS
Status: CANCELLED | OUTPATIENT
Start: 2021-05-05

## 2021-05-05 RX ORDER — LABETALOL 20 MG/4 ML (5 MG/ML) INTRAVENOUS SYRINGE
5
Status: CANCELLED | OUTPATIENT
Start: 2021-05-05

## 2021-05-05 RX ORDER — FENTANYL CITRATE/PF 50 MCG/ML
12.5 SYRINGE (ML) INJECTION
Status: CANCELLED | OUTPATIENT
Start: 2021-05-05

## 2021-05-05 RX ADMIN — PROPOFOL 20 MG: 10 INJECTION, EMULSION INTRAVENOUS at 08:34

## 2021-05-05 RX ADMIN — PROPOFOL 20 MG: 10 INJECTION, EMULSION INTRAVENOUS at 08:30

## 2021-05-05 RX ADMIN — PROPOFOL 20 MG: 10 INJECTION, EMULSION INTRAVENOUS at 08:49

## 2021-05-05 RX ADMIN — PROPOFOL 20 MG: 10 INJECTION, EMULSION INTRAVENOUS at 08:44

## 2021-05-05 RX ADMIN — PROPOFOL 20 MG: 10 INJECTION, EMULSION INTRAVENOUS at 08:32

## 2021-05-05 RX ADMIN — PROPOFOL 80 MG: 10 INJECTION, EMULSION INTRAVENOUS at 08:28

## 2021-05-05 RX ADMIN — PROPOFOL 20 MG: 10 INJECTION, EMULSION INTRAVENOUS at 08:36

## 2021-05-05 RX ADMIN — PROPOFOL 20 MG: 10 INJECTION, EMULSION INTRAVENOUS at 08:51

## 2021-05-05 RX ADMIN — PROPOFOL 20 MG: 10 INJECTION, EMULSION INTRAVENOUS at 08:38

## 2021-05-05 RX ADMIN — PROPOFOL 20 MG: 10 INJECTION, EMULSION INTRAVENOUS at 09:01

## 2021-05-05 RX ADMIN — PROPOFOL 20 MG: 10 INJECTION, EMULSION INTRAVENOUS at 08:42

## 2021-05-05 RX ADMIN — PROPOFOL 20 MG: 10 INJECTION, EMULSION INTRAVENOUS at 08:58

## 2021-05-05 RX ADMIN — PROPOFOL 20 MG: 10 INJECTION, EMULSION INTRAVENOUS at 08:40

## 2021-05-05 RX ADMIN — PROPOFOL 20 MG: 10 INJECTION, EMULSION INTRAVENOUS at 08:46

## 2021-05-05 RX ADMIN — SODIUM CHLORIDE 50 ML/HR: 9 INJECTION, SOLUTION INTRAVENOUS at 08:19

## 2021-05-05 RX ADMIN — PROPOFOL 20 MG: 10 INJECTION, EMULSION INTRAVENOUS at 08:56

## 2021-05-05 RX ADMIN — PROPOFOL 20 MG: 10 INJECTION, EMULSION INTRAVENOUS at 08:54

## 2021-05-05 NOTE — DISCHARGE INSTRUCTIONS
Colonoscopy   WHAT YOU NEED TO KNOW:   A colonoscopy is a procedure to examine the inside of your colon (intestine) with a scope  Polyps or tissue growths may have been removed during your colonoscopy  It is normal to feel bloated and to have some abdominal discomfort  You should be passing gas  If you have hemorrhoids or you had polyps removed, you may have a small amount of bleeding  DISCHARGE INSTRUCTIONS:   Seek care immediately if:    You have sudden, severe abdominal pain   You have problems swallowing   You have a large amount of black, sticky bowel movements or blood in your bowel movements   You have sudden trouble breathing   You feel weak, lightheaded, or faint or your heart beats faster than normal for you  Contact your healthcare provider if:    You have a fever and chills   You have nausea or are vomiting   Your abdomen is bloated or feels full and hard   You have abdominal pain   You have black, sticky bowel movements or blood in your bowel movements   You have not had a bowel movement for 3 days after your procedure   You have rash or hives   You have questions or concerns about your procedure  Activity:    Do not lift, strain, or run for 24 hours after your procedure   Rest after your procedure  You have been given medicine to relax you  Do not drive or make important decisions until the day after your procedure  Return to your normal activity as directed   Relieve gas and discomfort from bloating by lying on your right side with a heating pad on your abdomen  You may need to take short walks to help the gas move out  Eat small meals until bloating is relieved  Follow up with your healthcare provider as directed: Write down your questions so you remember to ask them during your visits  If you take a blood thinner, please review the specific instructions from your endoscopist about when you should resume it   These can be found in the Recommendation and Your Medication list sections of this After Visit Summary  Hemorrhoids   WHAT YOU NEED TO KNOW:   What are hemorrhoids? Hemorrhoids are swollen blood vessels inside your rectum (internal hemorrhoids) or on your anus (external hemorrhoids)  Sometimes a hemorrhoid may prolapse  This means it extends out of your anus  What increases my risk for hemorrhoids? · Pregnancy or obesity    · Straining or sitting for a long time during bowel movements    · Liver disease    · Weak muscles around the anus caused by older age, rectal surgery, or anal intercourse    · A lack of physical activity    · Chronic diarrhea or constipation    · A low-fiber diet    What are the signs and symptoms of hemorrhoids? · Pain or itching around your anus or inside your rectum    · Swelling or bumps around your anus    · Bright red blood in your bowel movement, on the toilet paper, or in the toilet bowl    · Tissue bulging out of your anus (prolapsed hemorrhoids)    · Incontinence (poor control over urine or bowel movements)    How are hemorrhoids diagnosed? Your healthcare provider will ask about your symptoms, the foods you eat, and your bowel movements  He or she will examine your anus for external hemorrhoids  You may need the following:  · A digital rectal exam  is a test to check for hemorrhoids  Your healthcare provider will put a gloved finger inside your anus to feel for the hemorrhoids  · An anoscopy  is a test that uses a scope (small tube with a light and camera on the end) to look at your hemorrhoids  How are hemorrhoids treated? Treatment will depend on your symptoms  You may need any of the following:  · Medicines  can help decrease pain and swelling, and soften your bowel movement  The medicine may be a pill, pad, cream, or ointment  · Procedures  may be used to shrink or remove your hemorrhoid  Examples include rubber-band ligation, sclerotherapy, and photocoagulation   These procedures may be done in your healthcare provider's office  Ask your healthcare provider for more information about these procedures  · Surgery  may be needed to shrink or remove your hemorrhoids  How can I manage my symptoms? · Apply ice on your anus for 15 to 20 minutes every hour or as directed  Use an ice pack, or put crushed ice in a plastic bag  Cover it with a towel before you apply it to your anus  Ice helps prevent tissue damage and decreases swelling and pain  · Take a sitz bath  Fill a bathtub with 4 to 6 inches of warm water  You may also use a sitz bath pan that fits inside a toilet bowl  Sit in the sitz bath for 15 minutes  Do this 3 times a day, and after each bowel movement  The warm water can help decrease pain and swelling  · Keep your anal area clean  Gently wash the area with warm water daily  Soap may irritate the area  After a bowel movement, wipe with moist towelettes or wet toilet paper  Dry toilet paper can irritate the area  How can I help prevent hemorrhoids? · Do not strain to have a bowel movement  Do not sit on the toilet too long  These actions can increase pressure on the tissues in your rectum and anus  · Drink plenty of liquids  Liquids can help prevent constipation  Ask how much liquid to drink each day and which liquids are best for you  · Eat a variety of high-fiber foods  Examples include fruits, vegetables, and whole grains  Ask your healthcare provider how much fiber you need each day  You may need to take a fiber supplement  · Exercise as directed  Exercise, such as walking, may make it easier to have a bowel movement  Ask your healthcare provider to help you create an exercise plan  · Do not have anal sex  Anal sex can weaken the skin around your rectum and anus  · Avoid heavy lifting  This can cause straining and increase your risk for another hemorrhoid  When should I seek immediate care?    · You have severe pain in your rectum or around your anus  · You have severe pain in your abdomen and you are vomiting  · You have bleeding from your anus that soaks through your underwear  When should I contact my healthcare provider? · You have frequent and painful bowel movements  · Your hemorrhoid looks or feels more swollen than usual      · You do not have a bowel movement for 2 days or more  · You see or feel tissue coming through your anus  · You have questions or concerns about your condition or care  CARE AGREEMENT:   You have the right to help plan your care  Learn about your health condition and how it may be treated  Discuss treatment options with your healthcare providers to decide what care you want to receive  You always have the right to refuse treatment  The above information is an  only  It is not intended as medical advice for individual conditions or treatments  Talk to your doctor, nurse or pharmacist before following any medical regimen to see if it is safe and effective for you  © Copyright [address] Information is for End User's use only and may not be sold, redistributed or otherwise used for commercial purposes  All illustrations and images included in CareNotes® are the copyrighted property of [employer]  or [address] [place], [place] [address]kiley Slaughter   Colorectal Polyps   WHAT YOU NEED TO KNOW:   What are colorectal polyps? Colorectal polyps are small growths of tissue in the lining of the colon and rectum  Most polyps are hyperplastic polyps and are usually benign (noncancerous)  Certain types of polyps, called adenomatous polyps, may turn into cancer  What increases my risk of colorectal polyps? The exact cause of colorectal polyps is unknown   The following may increase your risk:  · Older age    · A diet of foods high in fat and low in fiber     · Family history of polyps    · Intestinal diseases, such as Crohn's disease or ulcerative colitis    · An unhealthy lifestyle, such as physical inactivity, smoking, or drinking alcohol    · Obesity    What are the signs and symptoms of colorectal polyps? · Blood in your bowel movement or bleeding from the rectum    · Change in bowel movement habits, such as diarrhea and constipation    · Abdominal pain    How are colorectal polyps diagnosed? You should have fecal blood screening once a year for colorectal disease if you are over 48years old  You should be screened earlier if you have an intestinal disease or a family history of polyps or colorectal cancer  During this screening, a sample of your bowel movement is checked for blood, which may be an early sign of colorectal polyps or cancer  You may also need any of the following tests:  · Digital rectal exam:  Your healthcare provider will examine your anus and use a finger to check your rectum for polyps  · Barium enema: A barium enema is an x-ray of the colon  A tube is put into your anus, and a liquid called barium is put through the tube  Barium is used so that healthcare providers can see your colon better on the x-ray film  · Virtual colonoscopy: This is a CT scan that takes pictures of the inside of your colon and rectum  A small, flexible tube is put into your rectum and air or carbon dioxide (gas) is used to expand your colon  This lets healthcare providers clearly see your colon and any polyps on a monitor  · Colonoscopy or sigmoidoscopy: These procedures help your healthcare provider see the inside of your colon using a flexible tube with a small light and camera on the end  During a sigmoidoscopy, your healthcare provider will only look at rectum and lower colon  During a colonoscopy, healthcare providers will look at the full length of your colon  Healthcare providers may remove a small amount of tissue from the colon for a biopsy  How are colorectal polyps treated? A polypectomy is a minimally invasive procedure to remove your polyps   They may be removed during a colonoscopy or sigmoidoscopy  Your healthcare provider may need to remove the polyps with a laparoscope  Laparoscopy is done by inserting a small, flexible scope into incisions made on your abdomen  What are the risks of colorectal polyps? You may bleed during a colonoscopy procedure  Your bowel may be perforated (torn) when polyps are removed  This may lead to an open abdominal surgery  During surgery, you may bleed too much or get an infection  Adenomatous polyps that are not removed may turn into cancer and become more difficult to treat  Where can I find support and more information? · Sandoval Robin ([facility])  [address], [place] [address]  Phone: [phone]  Web Address: Gabriela Barrios  [facility] gov    When should I contact my healthcare provider? · You have a fever  · You have chills, a cough, or feel weak and achy  · You have abdominal pain that does not go away or gets worse after you take medicine  · Your abdomen is swollen  · You are losing weight without trying  · You have questions or concerns about your condition or care  When should I seek immediate care or call 911? · You have sudden shortness of breath  · You have a fast heart rate, fast breathing, or are too dizzy to stand up  · You have severe abdominal pain  · You see blood in your bowel movement  CARE AGREEMENT:   You have the right to help plan your care  Learn about your health condition and how it may be treated  Discuss treatment options with your healthcare providers to decide what care you want to receive  You always have the right to refuse treatment  The above information is an  only  It is not intended as medical advice for individual conditions or treatments  Talk to your doctor, nurse or pharmacist before following any medical regimen to see if it is safe and effective for you    © [address] [employer] Information is for End User's use only and may not be sold, redistributed or otherwise used for commercial purposes   All illustrations and images included in CareNotes® are the copyrighted property of [employer]  or [facility] Heather Colmenares

## 2021-05-05 NOTE — ANESTHESIA PREPROCEDURE EVALUATION
Procedure:  COLONOSCOPY    Relevant Problems   CARDIO   (+) Atrial fibrillation (HCC)   (+) Hyperlipidemia   (+) Hypertension      ENDO   (+) Subclinical hypothyroidism      /RENAL   (+) Benign prostatic hyperplasia      MUSCULOSKELETAL   (+) Osteoarthritis      NEURO/PSYCH   (+) Chronic schizophrenia (HCC)      PULMONARY   (+) Obstructive sleep apnea        Physical Exam    Airway    Mallampati score: III  TM Distance: >3 FB  Neck ROM: full     Dental   No notable dental hx     Cardiovascular  Rhythm: regular, Rate: normal, Cardiovascular exam normal    Pulmonary  Pulmonary exam normal Breath sounds clear to auscultation,     Other Findings        Anesthesia Plan  ASA Score- 3     Anesthesia Type- IV sedation with anesthesia with ASA Monitors  Additional Monitors:   Airway Plan:           Plan Factors-Exercise tolerance (METS): >4 METS  Chart reviewed  Existing labs reviewed  Patient summary reviewed  Patient is not a current smoker  Induction-     Postoperative Plan-     Informed Consent- Anesthetic plan and risks discussed with patient

## 2021-05-05 NOTE — H&P
History and Physical - [facility]  Al Juarez 63 y o  male MRN: [id number]    HPI: Al Juarez is a 63y o  year old male who presents for colonoscopy for constipation and screening    REVIEW OF SYSTEMS: Per the HPI, and otherwise unremarkable  Historical Information   Past Medical History:   Diagnosis Date    At[facility] fibrillation ([facility] )     Hypertension     Parkinsonism ([facility] )     Schizophrenia ([facility] )      Past Surgical History:   Procedure Laterality Date    AV NODE ABLATION      JOINT REPLACEMENT      knee, hip    TOTAL HIP ARTHROPLASTY Left 08/2006    TOTAL KNEE ARTHROPLASTY Right 09/17/2011     Social History   Social History     Substance and Sexual Activity   Alcohol Use No     Social History     Substance and Sexual Activity   Drug Use No     Social History     Tobacco Use   Smoking Status Never Smoker   Smokeless Tobacco Never Used     Family History   Problem Relation Age of Onset    Hypertension Mother     Stroke Family         syndrome     Cancer Father     Colon polyps Sister     Colon cancer Neg Hx        Meds/Allergies       Current Outpatient Medications:     aspirin 81 MG tablet    divalproex sodium (DEPAKOTE) 250 mg EC tablet    divalproex sodium (DEPAKOTE) 500 mg EC tablet    furosemide (LASIX) 20 mg tablet    gabapentin (NEURONTIN) 600 MG tablet    metoprolol succinate (TOPROL-XL) 50 mg 24 hr tablet    OLANZapine (ZYPREXA) 10 mg tablet    OLANZapine (ZyPREXA) 20 MG tablet    linaCLOtide (Linzess) 72 MCG CAPS    Zoster Vac Recomb Adjuvanted (Shingrix) 50 MCG/0 5ML SUSR    Current Facility-Administered Medications:     sodium chloride 0 9 % infusion, 50 mL/hr, Intravenous, Continuous, 50 mL/hr at 05/05/21 0819    Allergies   Allergen Reactions    Haloperidol      Neuroleptic Malignant Syndrome           Objective     /78   Pulse 91   Temp 99 3 °F (37 4 °C)   Resp 15   Ht 5' 9 5" (1 765 m)   Wt 104 kg (229 lb)   SpO2 95%   BMI 33 33 kg/m²     PHYSICAL EXAM    Gen: NAD AAOx3  Head: Normocephalic, Atraumatic  CV: S1S2 RRR no m/r/g  CHEST: Clear b/l no c/r/w  ABD: soft, +BS NT/ND  EXT: no edema    ASSESSMENT/PLAN:  This is a 63y o  year old male here for colonoscopy, and he is stable and optimized for his procedure

## 2021-05-06 ENCOUNTER — TELEPHONE (OUTPATIENT)
Dept: GASTROENTEROLOGY | Facility: CLINIC | Age: 61
End: 2021-05-06

## 2021-05-06 NOTE — TELEPHONE ENCOUNTER
[place] [facility] [place]  [phone]  Ret'd call  The call he missed was courtesy f/u call from Endo  Pt asked when he would get results?-advised to allow the Dr 2 wks to get back to him  He is feeling well/has no concerns

## 2021-07-29 ENCOUNTER — OFFICE VISIT (OUTPATIENT)
Dept: URGENT CARE | Facility: CLINIC | Age: 61
End: 2021-07-29
Payer: MEDICARE

## 2021-07-29 VITALS
WEIGHT: 225 LBS | HEART RATE: 84 BPM | HEIGHT: 70 IN | RESPIRATION RATE: 16 BRPM | TEMPERATURE: 97.6 F | OXYGEN SATURATION: 97 % | BODY MASS INDEX: 32.21 KG/M2

## 2021-07-29 DIAGNOSIS — J06.9 UPPER RESPIRATORY TRACT INFECTION, UNSPECIFIED TYPE: Primary | ICD-10-CM

## 2021-07-29 PROCEDURE — G0463 HOSPITAL OUTPT CLINIC VISIT: HCPCS | Performed by: PREVENTIVE MEDICINE

## 2021-07-29 PROCEDURE — 99213 OFFICE O/P EST LOW 20 MIN: CPT | Performed by: PREVENTIVE MEDICINE

## 2021-07-29 NOTE — PROGRESS NOTES
[facility]        NAME: Joanie Calle is a 63 y o  male  : 1960    MRN: [id number]  DATE: 2021  TIME: 10:44 AM    Assessment and Plan   Upper respiratory tract infection, unspecified type [J06 9]  1  Upper respiratory tract infection, unspecified type           Patient Instructions       Follow up with PCP in 3-5 days  Proceed to  ER if symptoms worsen  Chief Complaint     Chief Complaint   Patient presents with    Sinus Problem     began 6 days ago  pt reports he is improving    Cough         History of Present Illness         Head cold type symptoms times 5 days  He started to feel better the last 2 days  Mild cough but nonproductive  Mild fullness in the left ear  Denies fever  Note he is vaccinated to [facility]  Review of Systems   Review of Systems   Constitutional: Negative for fatigue and fever  HENT: Positive for congestion  Negative for sore throat  Respiratory: Positive for cough            Current Medications       Current Outpatient Medications:     aspirin 81 MG tablet, Take 1 tablet by mouth daily, Disp: , Rfl:     divalproex sodium (DEPAKOTE) 250 mg EC tablet, Take 250 mg by mouth every other day, Disp: , Rfl:     divalproex sodium (DEPAKOTE) 500 mg EC tablet, Take 500 mg by mouth 2 (two) times a day  , Disp: , Rfl:     furosemide (LASIX) 20 mg tablet, Take 2 tablets by mouth daily , Disp: , Rfl:     gabapentin (NEURONTIN) 600 MG tablet, Take 600 mg by mouth 2 (two) times a day  , Disp: , Rfl:     metoprolol succinate (TOPROL-XL) 50 mg 24 hr tablet, Take 50 mg by mouth 2 (two) times a day, Disp: , Rfl:     OLANZapine (ZYPREXA) 10 mg tablet, Take 10 mg by mouth every evening , Disp: , Rfl:     OLANZapine (ZyPREXA) 20 MG tablet, Once daily to take with a 10mg tab, Disp: , Rfl:     linaCLOtide (Linzess) 72 MCG CAPS, Take 72 mcg by mouth daily (Patient not taking: Reported on 2021), Disp: 30 capsule, Rfl: 5    Zoster Vac Recomb Adjuvanted (Shingrix) 50 MCG/0 5ML SUSR, Administer 1 dose intramuscularly now and repeat for a 2nd dose in 2-6 months, Disp: 1 each, Rfl: 1    Current Allergies     Allergies as of 07/29/2021 - Reviewed 05/05/2021   Allergen Reaction Noted    Haloperidol  11/12/2013            The following portions of the patient's history were reviewed and updated as appropriate: allergies, current medications, past family history, past medical history, past social history, past surgical history and problem list      Past Medical History:   Diagnosis Date    [facility]     Hypertension     Parkinsonism [facility]     [facility]        Past Surgical History:   Procedure Laterality Date    AV NODE ABLATION      JOINT REPLACEMENT      knee, hip    TOTAL HIP ARTHROPLASTY Left 08/2006    TOTAL KNEE ARTHROPLASTY Right 09/17/2011       Family History   Problem Relation Age of Onset    Hypertension Mother     Stroke Family         syndrome     Cancer Father     Colon polyps Sister     Colon cancer Neg Hx          Medications have been verified  Objective   Pulse 84   Temp 97 6 °F (36 4 °C)   Resp 16   Ht 5' 9 5" (1 765 m)   Wt 102 kg (225 lb)   SpO2 97%   BMI 32 75 kg/m²   No LMP for male patient  Physical Exam     Physical Exam  HENT:      Right Ear: Tympanic membrane normal       Left Ear: Tympanic membrane normal       Mouth/Throat:      Mouth: Mucous membranes are moist       Pharynx: Oropharynx is clear  No oropharyngeal exudate or posterior oropharyngeal erythema  Pulmonary:      Comments: Very mild end inspiratory wheezes both bases  These clear with coughing and deep breathing  Lymphadenopathy:      Cervical: No cervical adenopathy

## 2021-08-05 ENCOUNTER — TELEPHONE (OUTPATIENT)
Dept: FAMILY MEDICINE CLINIC | Facility: CLINIC | Age: 61
End: 2021-08-05

## 2021-08-05 DIAGNOSIS — B34.9 VIRAL INFECTION, UNSPECIFIED: Primary | ICD-10-CM

## 2021-08-05 PROCEDURE — U0005 INFEC AGEN DETEC AMPLI PROBE: HCPCS | Performed by: FAMILY MEDICINE

## 2021-08-05 PROCEDURE — U0003 INFECTIOUS AGENT DETECTION BY NUCLEIC ACID (DNA OR RNA); SEVERE ACUTE RESPIRATORY SYNDROME CORONAVIRUS 2 (SARS-COV-2) (CORONAVIRUS DISEASE [COVID-19]), AMPLIFIED PROBE TECHNIQUE, MAKING USE OF HIGH THROUGHPUT TECHNOLOGIES AS DESCRIBED BY CMS-2020-01-R: HCPCS | Performed by: FAMILY MEDICINE

## 2021-08-05 NOTE — TELEPHONE ENCOUNTER
Patient would like covid test ordered  He still doesn't feel better  He went to urgent care the other week because we had no apt's  Are you able to order a test for him   He is still having a bad cough

## 2021-08-10 ENCOUNTER — OFFICE VISIT (OUTPATIENT)
Dept: FAMILY MEDICINE CLINIC | Facility: CLINIC | Age: 61
End: 2021-08-10
Payer: MEDICARE

## 2021-08-10 VITALS
RESPIRATION RATE: 20 BRPM | OXYGEN SATURATION: 97 % | WEIGHT: 216.4 LBS | HEIGHT: 69 IN | BODY MASS INDEX: 32.05 KG/M2 | DIASTOLIC BLOOD PRESSURE: 62 MMHG | TEMPERATURE: 97.8 F | HEART RATE: 67 BPM | SYSTOLIC BLOOD PRESSURE: 100 MMHG

## 2021-08-10 DIAGNOSIS — R06.00 DYSPNEA, UNSPECIFIED TYPE: ICD-10-CM

## 2021-08-10 DIAGNOSIS — F20.9 CHRONIC SCHIZOPHRENIA (HCC): ICD-10-CM

## 2021-08-10 DIAGNOSIS — I10 ESSENTIAL HYPERTENSION: Primary | ICD-10-CM

## 2021-08-10 DIAGNOSIS — E78.2 MIXED HYPERLIPIDEMIA: ICD-10-CM

## 2021-08-10 DIAGNOSIS — E03.8 SUBCLINICAL HYPOTHYROIDISM: ICD-10-CM

## 2021-08-10 DIAGNOSIS — G25.9 EXTRAPYRAMIDAL SYNDROME: ICD-10-CM

## 2021-08-10 DIAGNOSIS — I48.0 PAROXYSMAL ATRIAL FIBRILLATION (HCC): ICD-10-CM

## 2021-08-10 PROCEDURE — 99214 OFFICE O/P EST MOD 30 MIN: CPT | Performed by: FAMILY MEDICINE

## 2021-08-10 RX ORDER — FUROSEMIDE 40 MG/1
40 TABLET ORAL DAILY
COMMUNITY
Start: 2021-06-29

## 2021-08-10 NOTE — PROGRESS NOTES
Assessment/Plan:  Problem List Items Addressed This Visit        Endocrine    Subclinical hypothyroidism    Relevant Orders    T4, free    TSH, 3rd generation    Anti-microsomal antibody       Cardiovascular and Mediastinum    Hypertension - Primary     The patient's blood pressure stable on his current medication  We have made no changes today  He will continue with his healthy diet and exercise  We will see him back as scheduled  He will go for the lab work as ordered and we will follow up with the results  Relevant Medications    furosemide (LASIX) 40 mg tablet    Other Relevant Orders    Comprehensive metabolic panel    LDL cholesterol, direct    Lipid panel    T4, free    TSH, 3rd generation    Anti-microsomal antibody    Paroxysmal atrial fibrillation (HCC)     The patient will continue to follow-up with his cardiologist as scheduled  He will continue on the aspirin therapy  Nervous and Auditory    Extrapyramidal syndrome     The patient will continue to follow-up with Neurology as scheduled  Other    Chronic schizophrenia ([facility] )     The patient is stable on his current medication  He will continue to follow-up with his psychiatrist as scheduled  Dyspnea     The patient will go for the chest x-ray as ordered and will follow up closely with the results  We will have further instructions at that time  Relevant Orders    XR chest pa & lateral    Hyperlipidemia    Relevant Orders    Comprehensive metabolic panel    LDL cholesterol, direct    Lipid panel          Return in about 6 months (around 2/10/2022) for Annual physical- [facility]  Subjective:   Chief Complaint   Patient presents with    Follow-up     6 month follow-up, viral infection last week, finally starting to feel better, negative covid, lingering non productive cough        Patient ID: Prince Guzman is a 63 y o  male presents today for a routine checkup    Prince Guzman is a 63 y o  male presents today for checkup of his hypertension, hyperlipidemia, and subclinical hypothyroidism  He continues to see his psychiatrist regularly for management of his schizophrenia  He was sick with a respiratory infection last week and is finally starting to feel better  He did have a negative COVID-19 test   He has been fully vaccinated  The patient has a lingering cough-it is better but worse at night when he lays down  There is no shortness of breath  There are no fevers  He is feeling better overall  There is no shortness of breath or wheezing  He was having a slight wheeze when he was seen in [facility]  He has lost 9 lbs  He is watching his diet  He is doing well on his medications  The patient denies any chest pain, shortness of breath, or palpitations  There is no edema  There are no headaches or visual changes  There is no lightheadedness, dizziness, or fainting spells  Hypertension  This is a chronic problem  The current episode started more than 1 year ago  The problem is unchanged  The problem is controlled  Pertinent negatives include no anxiety, blurred vision, chest pain, headaches, malaise/fatigue, neck pain, orthopnea, palpitations, peripheral edema, PND, shortness of breath or sweats  The current treatment provides significant improvement  There are no compliance problems        The following portions of the patient's history were reviewed and updated as appropriate: allergies, current medications, past family history, past medical history, past social history, past surgical history and problem list   Patient Active Problem List   Diagnosis    Paroxysmal atrial fibrillation ([address] )    Benign prostatic hyperplasia    Cataract of both eyes    Hyperlipidemia    Hypertension    Obesity    Obstructive sleep apnea    Osteoarthritis    Extrapyramidal syndrome    Chronic schizophrenia ([address] )    Stasis dermatitis    Venous insufficiency    Subclinical hypothyroidism    Dyspnea     Past Medical History:   Diagnosis Date    Atrial fibrillation ([facility] )     Hypertension     Parkinsonism ([facility] )     Schizophrenia ([facility] )      Past Surgical History:   Procedure Laterality Date    AV NODE ABLATION      JOINT REPLACEMENT      knee, hip    TOTAL HIP ARTHROPLASTY Left 08/2006    TOTAL KNEE ARTHROPLASTY Right 09/17/2011     Allergies   Allergen Reactions    Haloperidol Hives and Rash     Neuroleptic Malignant Syndrome  Family History   Problem Relation Age of Onset    Hypertension Mother     Stroke Family         syndrome     Cancer Father     Colon polyps Sister     Colon cancer Neg Hx      Social History     Socioeconomic History    Marital status: /Civil Union     Spouse name: Not on file    Number of children: Not on file    Years of education: Not on file    Highest education level: Not on file   Occupational History    Not on file   Tobacco Use    Smoking status: Never Smoker    Smokeless tobacco: Never Used   Vaping Use    Vaping Use: Never used   Substance and Sexual Activity    Alcohol use: No    Drug use: No    Sexual activity: Not on file   Other Topics Concern    Not on file   Social History Narrative    Physical disability      Social Determinants of Health     Financial Resource Strain:     Difficulty of Paying Living Expenses:    Food Insecurity:     Worried About Running Out of Food in the Last Year:     Ran Out of Food in the Last Year:    Transportation Needs:     Lack of Transportation (Medical):      Lack of Transportation (Non-Medical):    Physical Activity: Insufficiently Active    Days of Exercise per Week: 2 days    Minutes of Exercise per Session: 20 min   Stress: No Stress Concern Present    Feeling of Stress : Not at all   Social Connections: Unknown    Frequency of Communication with Friends and Family: Not on file    Frequency of Social Gatherings with Friends and Family: Not on file    Attends Hoahaoism Services: Not on file   [place] Member of Clubs or Organizations: Not on file    Attends Club or Organization Meetings: Not on file    Marital Status:    Intimate Partner Violence: Not At Risk    Fear of Current or Ex-Partner: No    Emotionally Abused: No    Physically Abused: No    Sexually Abused: No     Current Outpatient Medications on File Prior to Visit   Medication Sig Dispense Refill    aspirin 81 MG tablet Take 1 tablet by mouth daily      divalproex sodium (DEPAKOTE) 250 mg EC tablet Take 250 mg by mouth every other day      divalproex sodium (DEPAKOTE) 500 mg EC tablet Take 500 mg by mouth 2 (two) times a day        furosemide (LASIX) 40 mg tablet Take 40 mg by mouth daily      gabapentin (NEURONTIN) 600 MG tablet Take 1,500 mg by mouth 2 (two) times a day 2 5 tablets per day  1 in AM and 1 5 in PM      metoprolol succinate (TOPROL-XL) 50 mg 24 hr tablet Take 50 mg by mouth 2 (two) times a day      OLANZapine (ZYPREXA) 10 mg tablet Take 10 mg by mouth every evening       OLANZapine (ZyPREXA) 20 MG tablet Once daily to take with a 10mg tab      Zoster Vac Recomb Adjuvanted (Shingrix) 50 MCG/0 5ML SUSR Administer 1 dose intramuscularly now and repeat for a 2nd dose in 2-6 months 1 each 1     No current facility-administered medications on file prior to visit  Review of Systems   Constitutional: Negative  Negative for malaise/fatigue  HENT: Negative  Eyes: Negative  Negative for blurred vision  Respiratory: Negative  Negative for shortness of breath  Cardiovascular: Negative  Negative for chest pain, palpitations, orthopnea and PND  Gastrointestinal: Negative  Endocrine: Negative  Genitourinary: Negative  Musculoskeletal: Negative  Negative for neck pain  Skin: Negative  Allergic/Immunologic: Negative  Neurological: Negative  Negative for headaches  Hematological: Negative  Psychiatric/Behavioral: Negative          Objective:  Vitals:    08/10/21 1041   BP: 100/62   BP Location: Right arm   Patient Position: Sitting   Cuff Size: Large   Pulse: 67   Resp: 20   Temp: 97 8 °F (36 6 °C)   TempSrc: Tympanic   SpO2: 97%   Weight: 98 2 kg (216 lb 6 4 oz)   Height: 5' 8 75" (1 746 m)     Body mass index is 32 19 kg/m²  Physical Exam  Vitals and nursing note reviewed  Constitutional:       General: He is not in acute distress  Appearance: He is well-developed  He is not diaphoretic  Eyes:      Pupils: Pupils are equal, round, and reactive to light  Neck:      Thyroid: No thyromegaly  Vascular: No JVD  Trachea: No tracheal deviation  Cardiovascular:      Rate and Rhythm: Normal rate and regular rhythm  Heart sounds: Normal heart sounds  No murmur heard  No friction rub  No gallop  Pulmonary:      Effort: Pulmonary effort is normal  No respiratory distress  Breath sounds: Normal breath sounds  No stridor  No wheezing or rales  Chest:      Chest wall: No tenderness  Abdominal:      General: Bowel sounds are normal  There is no distension  Palpations: Abdomen is soft  There is no mass  Tenderness: There is no abdominal tenderness  There is no guarding or rebound  Musculoskeletal:         General: Normal range of motion  Cervical back: Normal range of motion and neck supple  Lymphadenopathy:      Cervical: No cervical adenopathy  Skin:     General: Skin is warm and dry  Coloration: Skin is not pale  Findings: No erythema or rash  Neurological:      Mental Status: He is alert and oriented to person, place, and time  Cranial Nerves: No cranial nerve deficit  Motor: No abnormal muscle tone  Coordination: Coordination normal       Deep Tendon Reflexes: Reflexes are normal and symmetric   Reflexes normal

## 2021-08-11 PROBLEM — R06.00 DYSPNEA: Status: ACTIVE | Noted: 2021-08-11

## 2021-08-11 NOTE — ASSESSMENT & PLAN NOTE
The patient will continue to follow-up with his cardiologist as scheduled  He will continue on the aspirin therapy

## 2021-08-11 NOTE — ASSESSMENT & PLAN NOTE
The patient's blood pressure stable on his current medication  We have made no changes today  He will continue with his healthy diet and exercise  We will see him back as scheduled  He will go for the lab work as ordered and we will follow up with the results

## 2021-08-11 NOTE — ASSESSMENT & PLAN NOTE
The patient will go for the chest x-ray as ordered and will follow up closely with the results  We will have further instructions at that time

## 2021-08-13 ENCOUNTER — APPOINTMENT (OUTPATIENT)
Dept: RADIOLOGY | Facility: CLINIC | Age: 61
End: 2021-08-13
Payer: MEDICARE

## 2021-08-13 ENCOUNTER — APPOINTMENT (OUTPATIENT)
Dept: LAB | Facility: CLINIC | Age: 61
End: 2021-08-13
Payer: MEDICARE

## 2021-08-13 DIAGNOSIS — E78.2 MIXED HYPERLIPIDEMIA: ICD-10-CM

## 2021-08-13 DIAGNOSIS — I10 ESSENTIAL HYPERTENSION: ICD-10-CM

## 2021-08-13 DIAGNOSIS — E03.8 SUBCLINICAL HYPOTHYROIDISM: ICD-10-CM

## 2021-08-13 DIAGNOSIS — R06.00 DYSPNEA, UNSPECIFIED TYPE: ICD-10-CM

## 2021-08-13 LAB
ALBUMIN SERPL BCP-MCNC: 3.7 G/DL (ref 3.5–5)
ALP SERPL-CCNC: 52 U/L (ref 46–116)
ALT SERPL W P-5'-P-CCNC: 37 U/L (ref 12–78)
ANION GAP SERPL CALCULATED.3IONS-SCNC: 3 MMOL/L (ref 4–13)
AST SERPL W P-5'-P-CCNC: 42 U/L (ref 5–45)
BILIRUB SERPL-MCNC: 0.73 MG/DL (ref 0.2–1)
BUN SERPL-MCNC: 15 MG/DL (ref 5–25)
CALCIUM SERPL-MCNC: 8.8 MG/DL (ref 8.3–10.1)
CHLORIDE SERPL-SCNC: 108 MMOL/L (ref 100–108)
CHOLEST SERPL-MCNC: 148 MG/DL (ref 50–200)
CO2 SERPL-SCNC: 29 MMOL/L (ref 21–32)
CREAT SERPL-MCNC: 1.14 MG/DL (ref 0.6–1.3)
GFR SERPL CREATININE-BSD FRML MDRD: 69 ML/MIN/1.73SQ M
GLUCOSE P FAST SERPL-MCNC: 80 MG/DL (ref 65–99)
HDLC SERPL-MCNC: 38 MG/DL
LDLC SERPL CALC-MCNC: 73 MG/DL (ref 0–100)
LDLC SERPL DIRECT ASSAY-MCNC: 78 MG/DL (ref 0–100)
NONHDLC SERPL-MCNC: 110 MG/DL
POTASSIUM SERPL-SCNC: 4.3 MMOL/L (ref 3.5–5.3)
PROT SERPL-MCNC: 7.6 G/DL (ref 6.4–8.2)
SODIUM SERPL-SCNC: 140 MMOL/L (ref 136–145)
T4 FREE SERPL-MCNC: 0.93 NG/DL (ref 0.76–1.46)
TRIGL SERPL-MCNC: 187 MG/DL
TSH SERPL DL<=0.05 MIU/L-ACNC: 2.95 UIU/ML (ref 0.36–3.74)

## 2021-08-13 PROCEDURE — 83721 ASSAY OF BLOOD LIPOPROTEIN: CPT

## 2021-08-13 PROCEDURE — 84443 ASSAY THYROID STIM HORMONE: CPT

## 2021-08-13 PROCEDURE — 36415 COLL VENOUS BLD VENIPUNCTURE: CPT

## 2021-08-13 PROCEDURE — 80061 LIPID PANEL: CPT

## 2021-08-13 PROCEDURE — 71046 X-RAY EXAM CHEST 2 VIEWS: CPT

## 2021-08-13 PROCEDURE — 80053 COMPREHEN METABOLIC PANEL: CPT

## 2021-08-13 PROCEDURE — 84439 ASSAY OF FREE THYROXINE: CPT

## 2021-08-13 PROCEDURE — 86376 MICROSOMAL ANTIBODY EACH: CPT

## 2021-08-15 LAB — THYROPEROXIDASE AB SERPL-ACNC: 12 IU/ML (ref 0–34)

## 2022-02-11 ENCOUNTER — OFFICE VISIT (OUTPATIENT)
Dept: FAMILY MEDICINE CLINIC | Facility: CLINIC | Age: 62
End: 2022-02-11
Payer: MEDICARE

## 2022-02-11 VITALS
BODY MASS INDEX: 32.94 KG/M2 | TEMPERATURE: 98.3 F | RESPIRATION RATE: 18 BRPM | SYSTOLIC BLOOD PRESSURE: 120 MMHG | HEIGHT: 69 IN | HEART RATE: 80 BPM | WEIGHT: 222.4 LBS | DIASTOLIC BLOOD PRESSURE: 82 MMHG | OXYGEN SATURATION: 97 %

## 2022-02-11 DIAGNOSIS — Z12.5 ENCOUNTER FOR SCREENING FOR MALIGNANT NEOPLASM OF PROSTATE: ICD-10-CM

## 2022-02-11 DIAGNOSIS — F20.9 CHRONIC SCHIZOPHRENIA (HCC): ICD-10-CM

## 2022-02-11 DIAGNOSIS — B35.4 TINEA CORPORIS: ICD-10-CM

## 2022-02-11 DIAGNOSIS — Z13.1 SCREENING FOR DIABETES MELLITUS: ICD-10-CM

## 2022-02-11 DIAGNOSIS — Z13.6 SCREENING FOR CARDIOVASCULAR CONDITION: ICD-10-CM

## 2022-02-11 DIAGNOSIS — Z00.00 MEDICARE ANNUAL WELLNESS VISIT, SUBSEQUENT: Primary | ICD-10-CM

## 2022-02-11 DIAGNOSIS — N40.0 BENIGN PROSTATIC HYPERPLASIA, UNSPECIFIED WHETHER LOWER URINARY TRACT SYMPTOMS PRESENT: ICD-10-CM

## 2022-02-11 DIAGNOSIS — Z23 NEED FOR VACCINATION: ICD-10-CM

## 2022-02-11 PROCEDURE — G0439 PPPS, SUBSEQ VISIT: HCPCS | Performed by: FAMILY MEDICINE

## 2022-02-11 RX ORDER — ZOSTER VACCINE RECOMBINANT, ADJUVANTED 50 MCG/0.5
KIT INTRAMUSCULAR
Qty: 1 EACH | Refills: 1 | Status: SHIPPED | OUTPATIENT
Start: 2022-02-11 | End: 2022-04-29 | Stop reason: ALTCHOICE

## 2022-02-11 RX ORDER — KETOCONAZOLE 20 MG/G
CREAM TOPICAL 2 TIMES DAILY
Qty: 15 G | Refills: 1 | Status: SHIPPED | OUTPATIENT
Start: 2022-02-11 | End: 2022-04-29 | Stop reason: ALTCHOICE

## 2022-02-11 NOTE — PROGRESS NOTES
Assessment and Plan:     Problem List Items Addressed This Visit        Genitourinary    Benign prostatic hyperplasia    Relevant Orders    PSA, Total Screen       Other    Chronic schizophrenia ([address] )    Relevant Orders    Valproic acid level, total      Other Visit Diagnoses     Medicare annual wellness visit, subsequent    -  Primary    Screening for cardiovascular condition        Relevant Orders    Comprehensive metabolic panel    Lipid Panel with Direct LDL reflex    Screening for diabetes mellitus        Relevant Orders    Comprehensive metabolic panel    Lipid Panel with Direct LDL reflex    Need for vaccination        Relevant Medications    Zoster Vac Recomb Adjuvanted (Shingrix) 50 MCG/0 5ML SUSR    Tinea corporis        Relevant Medications    ketoconazole (NIZORAL) 2 % cream    Encounter for screening for malignant neoplasm of prostate         Relevant Orders    PSA, Total Screen      The patient had a normal exam today in the office  He will go for the testing as indicated and will follow up with the results  We will see him back in the office as scheduled  BMI Counseling: Body mass index is 32 94 kg/m²  The BMI is above normal  Nutrition recommendations include decreasing portion sizes, encouraging healthy choices of fruits and vegetables, decreasing fast food intake, consuming healthier snacks, limiting drinks that contain sugar, moderation in carbohydrate intake, increasing intake of lean protein, reducing intake of saturated and trans fat and reducing intake of cholesterol  Exercise recommendations include exercising 3-5 times per week and strength training exercises  No pharmacotherapy was ordered  Patient referred to PCP  Rationale for BMI follow-up plan is due to patient being overweight or obese  Depression Screening and Follow-up Plan: Patient was screened for depression during today's encounter  They screened negative with a PHQ-2 score of 0        Preventive health issues were discussed with patient, and age appropriate screening tests were ordered as noted in patient's After Visit Summary  Personalized health advice and appropriate referrals for health education or preventive services given if needed, as noted in patient's After Visit Summary  Chief Complaint   Patient presents with   [facility] Wellness Visit     Subsequent Medicare Wellness        History of Present Illness:     Patient presents for a subsequent Medicare wellness visit  Laura Perez is a 57 y o  male who presents today for a subsequent Medicare wellness visit  he   has been feeling well and has no complaints today  The patient denies any chest pain, shortness of breath, or palpitations  There is no edema  There are no headaches or visual changes  There is no lightheadedness, dizziness, or fainting spells  There are no GI symptoms  The patient goes for dental exams every 6 months and sees his eye doctor  The patient is watching his diet and follows a regular exercise program    He had his Covid booster 2 weeks ago  He is having an ongoing problems with the bowel movements  There is relief with colace as needed  There are no problems  Patient Care Team:  Parminder Uriostegui DO as PCP - General (Family Medicine)  Carlos Mayes MD (Psychiatry)  Clara Jefferson MD (Cardiology)     Review of Systems:     Review of Systems   Constitutional: Negative  HENT: Negative  Eyes: Negative  Respiratory: Negative  Cardiovascular: Negative  Gastrointestinal: Negative  Endocrine: Negative  Genitourinary: Negative  Musculoskeletal: Negative  Skin: Negative  Allergic/Immunologic: Negative  Neurological: Negative  Hematological: Negative  Psychiatric/Behavioral: Negative         Problem List:     Patient Active Problem List   Diagnosis    Paroxysmal atrial fibrillation (HCC)    Benign prostatic hyperplasia    Cataract of both eyes    Hyperlipidemia    Hypertension    Obesity    Obstructive sleep apnea    Osteoarthritis    Extrapyramidal syndrome    Chronic schizophrenia (HCC)    Stasis dermatitis    Venous insufficiency    Subclinical hypothyroidism    Dyspnea      Past Medical and Surgical History:     Past Medical History:   Diagnosis Date    Atrial fibrillation ([facility] )     Hypertension     Parkinsonism ([facility] )     Schizophrenia ([facility] )      Past Surgical History:   Procedure Laterality Date    AV NODE ABLATION      JOINT REPLACEMENT      knee, hip    TOTAL HIP ARTHROPLASTY Left 08/2006    TOTAL KNEE ARTHROPLASTY Right 09/17/2011      Family History:     Family History   Problem Relation Age of Onset    Hypertension Mother     Stroke Family         syndrome     Cancer Father     Colon polyps Sister     Colon cancer Neg Hx       Social History:     Social History     Socioeconomic History    Marital status: /Civil Union     Spouse name: None    Number of children: None    Years of education: None    Highest education level: None   Occupational History    None   Tobacco Use    Smoking status: Never Smoker    Smokeless tobacco: Never Used   Vaping Use    Vaping Use: Never used   Substance and Sexual Activity    Alcohol use: No    Drug use: No    Sexual activity: None   Other Topics Concern    None   Social History Narrative    Physical disability      Social Determinants of Health     Financial Resource Strain: Not on file   Food Insecurity: Not on file   Transportation Needs: Not on file   Physical Activity: Not on file   Stress: Not on file   Social Connections: Not on file   Intimate Partner Violence: Not At Risk    Fear of Current or Ex-Partner: No    Emotionally Abused: No    Physically Abused: No    Sexually Abused: No   Housing Stability: Not on file      Medications and Allergies:     Current Outpatient Medications   Medication Sig Dispense Refill    aspirin 81 MG tablet Take 1 tablet by mouth daily      divalproex sodium (DEPAKOTE) 250 mg EC tablet Take 250 mg by mouth every other day      divalproex sodium (DEPAKOTE) 500 mg EC tablet Take 500 mg by mouth 2 (two) times a day        furosemide (LASIX) 40 mg tablet Take 40 mg by mouth daily      gabapentin (NEURONTIN) 600 MG tablet Take 1,500 mg by mouth 2 (two) times a day        metoprolol succinate (TOPROL-XL) 50 mg 24 hr tablet Take 50 mg by mouth 2 (two) times a day      OLANZapine (ZYPREXA) 10 mg tablet Take 10 mg by mouth every evening       OLANZapine (ZyPREXA) 20 MG tablet Once daily to take with a 10mg tab      ketoconazole (NIZORAL) 2 % cream Apply topically 2 (two) times a day 15 g 1    Zoster Vac Recomb Adjuvanted (Shingrix) 50 MCG/0 5ML SUSR Administer 1 dose intramuscularly now and repeat for a 2nd dose in 2-6 months 1 each 1     No current facility-administered medications for this visit  Allergies   Allergen Reactions    Haloperidol Hives and Rash     Neuroleptic Malignant Syndrome  Immunizations:     Immunization History   Administered Date(s) Administered    COVID-19 PFIZER VACCINE 0 3 ML IM 03/28/2021, 04/18/2021    INFLUENZA 11/02/2013, 11/04/2016, 09/23/2017, 10/13/2018, 08/12/2020    Influenza Quadrivalent 3 years and older 08/12/2020    Influenza Quadrivalent Preservative Free 3 years and older IM 10/04/2021    Influenza, injectable, quadrivalent, preservative free 0 5 mL 10/16/2019    Influenza, seasonal, injectable 1959, 11/10/2012, 11/04/2016, 09/23/2017    Pneumococcal Polysaccharide PPV23 11/12/2013    Td (adult), adsorbed 1959    Tdap 08/22/2019      Health Maintenance:         Topic Date Due    Colorectal Cancer Screening  05/05/2022    HIV Screening  Completed    Hepatitis C Screening  Completed         Topic Date Due    COVID-19 Vaccine (3 - Booster for Auto I.D. series) 09/18/2021      Medicare Screening Tests and Risk Assessments:     Ronnie Kiran is here for his Initial Wellness and Subsequent Wellness visit   Last Medicare Wellness visit information reviewed, patient interviewed and updates made to the record today  Health Risk Assessment:   Patient rates overall health as very good  Patient feels that their physical health rating is same  Patient is satisfied with their life  Eyesight was rated as same  Hearing was rated as slightly worse  Patient feels that their emotional and mental health rating is same  Patients states they are never, rarely angry  Patient states they are never, rarely unusually tired/fatigued  Pain experienced in the last 7 days has been none  Patient states that he has experienced no weight loss or gain in last 6 months  Occasional achiness  He is feeling well overall  He has a stationary bike and needs to exercise more  Depression Screening:   PHQ-2 Score: 0      Fall Risk Screening: In the past year, patient has experienced: no history of falling in past year      Home Safety:  Patient does not have trouble with stairs inside or outside of their home  Patient has working smoke alarms and has working carbon monoxide detector  Home safety hazards include: none  Nutrition:   Current diet is Regular, Limited junk food and No Added Salt  Medications:   Patient is not currently taking any over-the-counter supplements  Patient is able to manage medications  Activities of Daily Living (ADLs)/Instrumental Activities of Daily Living (IADLs):   Walk and transfer into and out of bed and chair?: Yes  Dress and groom yourself?: Yes    Bathe or shower yourself?: Yes    Feed yourself? Yes  Do your laundry/housekeeping?: Yes  Manage your money, pay your bills and track your expenses?: Yes  Make your own meals?: No    Do your own shopping?: No    ADL comments: His wife does the cooking        Previous Hospitalizations:   Any hospitalizations or ED visits within the last 12 months?: No      Advance Care Planning:   Living will: No    Durable POA for healthcare: No    Advanced directive: No Advanced directive counseling given: Yes    Five wishes given: No    Patient declined ACP directive: No    End of Life Decisions reviewed with patient: Yes    Provider agrees with end of life decisions: Yes      Comments: He is looking into it  PREVENTIVE SCREENINGS      Cardiovascular Screening:    General: History Lipid Disorder, Risks and Benefits Discussed and Screening Current      Diabetes Screening:     General: Screening Current and Risks and Benefits Discussed    Due for: Blood Glucose      Colorectal Cancer Screening:     General: Screening Current    Due for: Colonoscopy - Low Risk      Prostate Cancer Screening:    General: Risks and Benefits Discussed and Screening Current      Osteoporosis Screening:    General: Risks and Benefits Discussed and Screening Current      Abdominal Aortic Aneurysm (AAA) Screening:        General: Screening Not Indicated      Lung Cancer Screening:     General: Screening Not Indicated      Hepatitis C Screening:    General: Screening Current    Screening, Brief Intervention, and Referral to Treatment (SBIRT)    Screening  Typical number of drinks in a day: 0  Typical number of drinks in a week: 0  Interpretation: Low risk drinking behavior  AUDIT-C Screenin) How often did you have a drink containing alcohol in the past year? never  2) How many drinks did you have on a typical day when you were drinking in the past year? 0  3) How often did you have 6 or more drinks on one occasion in the past year? never    AUDIT-C Score: 0  Interpretation: Score 0-3 (male): Negative screen for alcohol misuse    Single Item Drug Screening:  How often have you used an illegal drug (including marijuana) or a prescription medication for non-medical reasons in the past year? never    Single Item Drug Screen Score: 0  Interpretation: Negative screen for possible drug use disorder    Brief Intervention  Alcohol & drug use screenings were reviewed   No concerns regarding substance use disorder identified  Other Counseling Topics:   Car/seat belt/driving safety, skin self-exam, sunscreen and calcium and vitamin D intake and regular weightbearing exercise  Visual Acuity Screening    Right eye Left eye Both eyes   Without correction:   20/25   With correction:      Comments: He had an eye exam 2 months ago- he had cataract surgery 6 years ago  Physical Exam:     /82 (BP Location: Left arm, Patient Position: Sitting, Cuff Size: Large)   Pulse 80   Temp 98 3 °F (36 8 °C) (Tympanic)   Resp 18   Ht 5' 8 9" (1 75 m)   Wt 101 kg (222 lb 6 4 oz)   SpO2 97%   BMI 32 94 kg/m²     Physical Exam  Vitals and nursing note reviewed  Constitutional:       Appearance: He is well-developed  HENT:      Head: Normocephalic and atraumatic  Right Ear: External ear normal       Left Ear: External ear normal       Nose: Nose normal       Mouth/Throat:      Pharynx: No oropharyngeal exudate  Eyes:      Conjunctiva/sclera: Conjunctivae normal       Pupils: Pupils are equal, round, and reactive to light  Neck:      Thyroid: No thyromegaly  Trachea: No tracheal deviation  Cardiovascular:      Rate and Rhythm: Normal rate and regular rhythm  Heart sounds: Normal heart sounds  No murmur heard  No friction rub  No gallop  Pulmonary:      Effort: Pulmonary effort is normal  No respiratory distress  Breath sounds: Normal breath sounds  No stridor  No wheezing or rales  Chest:      Chest wall: No tenderness  Abdominal:      General: Bowel sounds are normal  There is no distension  Palpations: Abdomen is soft  There is no mass  Tenderness: There is no abdominal tenderness  There is no guarding or rebound  Hernia: No hernia is present  Genitourinary:     Penis: No tenderness  Musculoskeletal:         General: No tenderness or deformity  Normal range of motion  Cervical back: Normal range of motion and neck supple     Lymphadenopathy: Cervical: No cervical adenopathy  Skin:     General: Skin is warm and dry  Coloration: Skin is not pale  Findings: No erythema or rash  Neurological:      Mental Status: He is alert and oriented to person, place, and time  Cranial Nerves: No cranial nerve deficit  Sensory: No sensory deficit  Motor: No abnormal muscle tone  Coordination: Coordination normal       Deep Tendon Reflexes: Reflexes normal    Psychiatric:         Behavior: Behavior normal          Thought Content: Thought content normal          Judgment: Judgment normal         No visits with results within 2 Month(s) from this visit     Latest known visit with results is:   Appointment on 08/13/2021   Component Date Value    Sodium 08/13/2021 140     Potassium 08/13/2021 4 3     Chloride 08/13/2021 108     CO2 08/13/2021 29     ANION GAP 08/13/2021 3*    BUN 08/13/2021 15     Creatinine 08/13/2021 1 14     Glucose, Fasting 08/13/2021 80     Calcium 08/13/2021 8 8     AST 08/13/2021 42     ALT 08/13/2021 37     Alkaline Phosphatase 08/13/2021 52     Total Protein 08/13/2021 7 6     Albumin 08/13/2021 3 7     Total Bilirubin 08/13/2021 0 73     eGFR 08/13/2021 69     LDL Direct 08/13/2021 78     Cholesterol 08/13/2021 148     Triglycerides 08/13/2021 187*    HDL, Direct 08/13/2021 38*    LDL Calculated 08/13/2021 73     Non-HDL-Chol (CHOL-HDL) 08/13/2021 110     Free T4 08/13/2021 0 93     TSH 3RD GENERATON 08/13/2021 2 950     THYROID MICROSOMAL ANTIB* 08/13/2021 [facility]

## 2022-02-11 NOTE — PATIENT INSTRUCTIONS

## 2022-02-16 ENCOUNTER — APPOINTMENT (OUTPATIENT)
Dept: LAB | Facility: CLINIC | Age: 62
End: 2022-02-16
Payer: MEDICARE

## 2022-02-16 DIAGNOSIS — F20.9 CHRONIC SCHIZOPHRENIA (HCC): ICD-10-CM

## 2022-02-16 DIAGNOSIS — N40.0 BENIGN PROSTATIC HYPERPLASIA, UNSPECIFIED WHETHER LOWER URINARY TRACT SYMPTOMS PRESENT: ICD-10-CM

## 2022-02-16 DIAGNOSIS — Z13.6 SCREENING FOR CARDIOVASCULAR CONDITION: ICD-10-CM

## 2022-02-16 DIAGNOSIS — Z12.5 ENCOUNTER FOR SCREENING FOR MALIGNANT NEOPLASM OF PROSTATE: ICD-10-CM

## 2022-02-16 DIAGNOSIS — Z13.1 SCREENING FOR DIABETES MELLITUS: ICD-10-CM

## 2022-02-16 LAB
ALBUMIN SERPL BCP-MCNC: 3.9 G/DL (ref 3.5–5)
ALP SERPL-CCNC: 51 U/L (ref 46–116)
ALT SERPL W P-5'-P-CCNC: 37 U/L (ref 12–78)
ANION GAP SERPL CALCULATED.3IONS-SCNC: 3 MMOL/L (ref 4–13)
AST SERPL W P-5'-P-CCNC: 25 U/L (ref 5–45)
BILIRUB SERPL-MCNC: 0.69 MG/DL (ref 0.2–1)
BUN SERPL-MCNC: 21 MG/DL (ref 5–25)
CALCIUM SERPL-MCNC: 9.4 MG/DL (ref 8.3–10.1)
CHLORIDE SERPL-SCNC: 107 MMOL/L (ref 100–108)
CHOLEST SERPL-MCNC: 153 MG/DL
CO2 SERPL-SCNC: 29 MMOL/L (ref 21–32)
CREAT SERPL-MCNC: 1.19 MG/DL (ref 0.6–1.3)
GFR SERPL CREATININE-BSD FRML MDRD: 65 ML/MIN/1.73SQ M
GLUCOSE P FAST SERPL-MCNC: 87 MG/DL (ref 65–99)
HDLC SERPL-MCNC: 33 MG/DL
LDLC SERPL CALC-MCNC: 78 MG/DL (ref 0–100)
POTASSIUM SERPL-SCNC: 4.2 MMOL/L (ref 3.5–5.3)
PROT SERPL-MCNC: 7.6 G/DL (ref 6.4–8.2)
PSA SERPL-MCNC: 0.9 NG/ML (ref 0–4)
SODIUM SERPL-SCNC: 139 MMOL/L (ref 136–145)
TRIGL SERPL-MCNC: 209 MG/DL
VALPROATE SERPL-MCNC: 91 UG/ML (ref 50–100)

## 2022-02-16 PROCEDURE — 80053 COMPREHEN METABOLIC PANEL: CPT

## 2022-02-16 PROCEDURE — 80164 ASSAY DIPROPYLACETIC ACD TOT: CPT

## 2022-02-16 PROCEDURE — 36415 COLL VENOUS BLD VENIPUNCTURE: CPT

## 2022-02-16 PROCEDURE — 80061 LIPID PANEL: CPT

## 2022-02-16 PROCEDURE — G0103 PSA SCREENING: HCPCS

## 2022-04-21 ENCOUNTER — TELEPHONE (OUTPATIENT)
Dept: GASTROENTEROLOGY | Facility: CLINIC | Age: 62
End: 2022-04-21

## 2022-04-29 ENCOUNTER — TELEPHONE (OUTPATIENT)
Dept: FAMILY MEDICINE CLINIC | Facility: CLINIC | Age: 62
End: 2022-04-29

## 2022-04-29 ENCOUNTER — OFFICE VISIT (OUTPATIENT)
Dept: FAMILY MEDICINE CLINIC | Facility: CLINIC | Age: 62
End: 2022-04-29
Payer: MEDICARE

## 2022-04-29 ENCOUNTER — APPOINTMENT (OUTPATIENT)
Dept: RADIOLOGY | Facility: CLINIC | Age: 62
End: 2022-04-29
Payer: MEDICARE

## 2022-04-29 VITALS
OXYGEN SATURATION: 96 % | WEIGHT: 224.6 LBS | TEMPERATURE: 98.7 F | DIASTOLIC BLOOD PRESSURE: 88 MMHG | BODY MASS INDEX: 33.27 KG/M2 | HEIGHT: 69 IN | RESPIRATION RATE: 20 BRPM | SYSTOLIC BLOOD PRESSURE: 142 MMHG | HEART RATE: 80 BPM

## 2022-04-29 DIAGNOSIS — M79.671 CHRONIC FOOT PAIN, RIGHT: ICD-10-CM

## 2022-04-29 DIAGNOSIS — R22.41 LOCALIZED SWELLING OF RIGHT FOOT: ICD-10-CM

## 2022-04-29 DIAGNOSIS — M25.471 RIGHT ANKLE SWELLING: ICD-10-CM

## 2022-04-29 DIAGNOSIS — M25.774 OSTEOPHYTE OF RIGHT FOOT: ICD-10-CM

## 2022-04-29 DIAGNOSIS — G89.29 CHRONIC FOOT PAIN, RIGHT: ICD-10-CM

## 2022-04-29 DIAGNOSIS — G89.29 CHRONIC FOOT PAIN, RIGHT: Primary | ICD-10-CM

## 2022-04-29 DIAGNOSIS — M79.671 CHRONIC FOOT PAIN, RIGHT: Primary | ICD-10-CM

## 2022-04-29 DIAGNOSIS — M19.071 PRIMARY OSTEOARTHRITIS OF RIGHT FOOT: Primary | ICD-10-CM

## 2022-04-29 PROCEDURE — 73610 X-RAY EXAM OF ANKLE: CPT

## 2022-04-29 PROCEDURE — 99214 OFFICE O/P EST MOD 30 MIN: CPT | Performed by: FAMILY MEDICINE

## 2022-04-29 PROCEDURE — 73630 X-RAY EXAM OF FOOT: CPT

## 2022-04-29 RX ORDER — METHYLPREDNISOLONE 4 MG/1
TABLET ORAL
Qty: 21 EACH | Refills: 0 | Status: SHIPPED | OUTPATIENT
Start: 2022-04-29

## 2022-04-29 NOTE — TELEPHONE ENCOUNTER
I called and spoke with the patient on 4/29/2022 and reviewed the results of his x-rays  It demonstrates severe osteoarthritis and osteophyte formation at the tarsometatarsal joint of his right foot  There arthritis changes throughout  I advised the patient to take the Medrol Dosepak as ordered to help with the inflammation and I am going to refer him to Orthopedics for further evaluation  We will see him back as scheduled

## 2022-04-29 NOTE — PROGRESS NOTES
Assessment/Plan:  Problem List Items Addressed This Visit     None      Visit Diagnoses     Chronic foot pain, right    -  Primary    Relevant Medications    methylPREDNISolone 4 MG tablet therapy pack    Other Relevant Orders    XR foot 3+ vw right (Completed)    Localized swelling of right foot        Relevant Medications    methylPREDNISolone 4 MG tablet therapy pack    Other Relevant Orders    XR foot 3+ vw right (Completed)    Right ankle swelling        Relevant Medications    methylPREDNISolone 4 MG tablet therapy pack    Other Relevant Orders    XR ankle 3+ vw right (Completed)      The patient has marked swelling and pain of his right foot  We will send him for the x-ray as indicated and follow up closely with the results  We did try him on the Medrol Dosepak to help with the inflammation  We may consider orthopedic referral     Return for Next scheduled follow up  I spent 20 minutes during the visit reviewing the history from the patient, performing the examination, discussing the findings with the patient, providing counseling and education, and making a plan  I spent 10 minutes ordering referrals and testing and documenting  Subjective:   Chief Complaint   Patient presents with    Right foot pain     Patient has right foot pain that started a few days ago  He states this is not a new problem, notes he's had a few episodes over the last few months  Notes some swelling in the foot as well  Patient ID: Trudy Burkitt is a 57 y o  male presents today for a follow up of his right foot pain  Trudy Burkitt is a 57 y o  male who presents with right foot on and off for a year  He has had episodes of pain and swelling on and off  He has had pain and swelling in the right foot for 2 days  There was no injury  There is no numbness or tingling  It is tight and swollen  There is pain in the dorsum of the foot  There is swelling in the right ankle, but no pain    There is pain with weight bearing  There is some redness  There is no swelling on the other side  He took Motrin last night- ne relief  The following portions of the patient's history were reviewed and updated as appropriate: allergies, current medications, past family history, past medical history, past social history, past surgical history and problem list   Patient Active Problem List   Diagnosis    Paroxysmal atrial fibrillation (HCC)    Benign prostatic hyperplasia    Cataract of both eyes    Hyperlipidemia    Hypertension    Obesity    Obstructive sleep apnea    Osteoarthritis    Extrapyramidal syndrome    Chronic schizophrenia ([address] )    Stasis dermatitis    Venous insufficiency    Subclinical hypothyroidism    Dyspnea    Primary osteoarthritis of right foot    Osteophyte of right foot     Past Medical History:   Diagnosis Date    [facility]     Hypertension     Parkinsonism ([facility]     Schizophrenia ([facility] )      Past Surgical History:   Procedure Laterality Date    AV NODE ABLATION      JOINT REPLACEMENT      knee, hip    TOTAL HIP ARTHROPLASTY Left 08/2006    TOTAL KNEE ARTHROPLASTY Right 09/17/2011     Allergies   Allergen Reactions    Haloperidol Hives and Rash     Neuroleptic Malignant Syndrome         Family History   Problem Relation Age of Onset    Hypertension Mother     Stroke Family         syndrome     Cancer Father     Colon polyps Sister     Colon cancer Neg Hx      Social History     Socioeconomic History    Marital status: /Civil Union     Spouse name: Not on file    Number of children: Not on file    Years of education: Not on file    Highest education level: Not on file   Occupational History    Not on file   Tobacco Use    Smoking status: Never Smoker    Smokeless tobacco: Never Used   Vaping Use    Vaping Use: Never used   Substance and Sexual Activity    Alcohol use: No    Drug use: No    Sexual activity: Not on file   Other Topics Concern    Not on file   Social History Narrative    Physical disability      Social Determinants of Health     Financial Resource Strain: Not on file   Food Insecurity: Not on file   Transportation Needs: Not on file   Physical Activity: Inactive    Days of Exercise per Week: 0 days    Minutes of Exercise per Session: 0 min   Stress: No Stress Concern Present    Feeling of Stress : Not at all   Social Connections: Not on file   Intimate Partner Violence: Not At Risk    Fear of Current or Ex-Partner: No    Emotionally Abused: No    Physically Abused: No    Sexually Abused: No   Housing Stability: Not on file     Current Outpatient Medications on File Prior to Visit   Medication Sig Dispense Refill    aspirin 81 MG tablet Take 1 tablet by mouth daily      divalproex sodium (DEPAKOTE) 250 mg EC tablet Take 250 mg by mouth every other day      divalproex sodium (DEPAKOTE) 500 mg EC tablet Take 500 mg by mouth 2 (two) times a day        furosemide (LASIX) 40 mg tablet Take 40 mg by mouth daily      gabapentin (NEURONTIN) 600 MG tablet Take 1,500 mg by mouth 2 (two) times a day        metoprolol succinate (TOPROL-XL) 50 mg 24 hr tablet Take 50 mg by mouth 2 (two) times a day      OLANZapine (ZYPREXA) 10 mg tablet Take 10 mg by mouth every evening       OLANZapine (ZyPREXA) 20 MG tablet Once daily to take with a 10mg tab       No current facility-administered medications on file prior to visit  Review of Systems   Constitutional: Negative  HENT: Negative  Eyes: Negative  Respiratory: Negative  Cardiovascular: Negative  Gastrointestinal: Negative  Endocrine: Negative  Genitourinary: Negative  Musculoskeletal: Positive for arthralgias and joint swelling  Skin: Negative  Allergic/Immunologic: Negative  Neurological: Negative  Hematological: Negative  Psychiatric/Behavioral: Negative          Objective:  Vitals:    04/29/22 1351   BP: 142/88   BP Location: Left arm   Patient Position: Sitting   Cuff Size: Standard   Pulse: 80   Resp: 20   Temp: 98 7 °F (37 1 °C)   TempSrc: Tympanic   SpO2: 96%   Weight: 102 kg (224 lb 9 6 oz)   Height: 5' 8 9" (1 75 m)     Body mass index is 33 26 kg/m²  Physical Exam  Vitals and nursing note reviewed  Constitutional:       General: He is not in acute distress  Appearance: He is well-developed  He is not diaphoretic  Eyes:      Pupils: Pupils are equal, round, and reactive to light  Neck:      Thyroid: No thyromegaly  Vascular: No JVD  Trachea: No tracheal deviation  Cardiovascular:      Rate and Rhythm: Normal rate and regular rhythm  Heart sounds: Normal heart sounds  No murmur heard  No friction rub  No gallop  Pulmonary:      Effort: Pulmonary effort is normal  No respiratory distress  Breath sounds: Normal breath sounds  No stridor  No wheezing or rales  Chest:      Chest wall: No tenderness  Abdominal:      General: Bowel sounds are normal  There is no distension  Palpations: Abdomen is soft  There is no mass  Tenderness: There is no abdominal tenderness  There is no guarding or rebound  Musculoskeletal:         General: Swelling and tenderness present  Normal range of motion  Cervical back: Normal range of motion and neck supple  Legs:    Lymphadenopathy:      Cervical: No cervical adenopathy  Skin:     General: Skin is warm and dry  Coloration: Skin is not pale  Findings: No erythema or rash  Neurological:      Mental Status: He is alert and oriented to person, place, and time  Cranial Nerves: No cranial nerve deficit  Motor: No abnormal muscle tone  Coordination: Coordination normal       Deep Tendon Reflexes: Reflexes are normal and symmetric   Reflexes normal

## 2022-05-31 ENCOUNTER — TELEPHONE (OUTPATIENT)
Dept: GASTROENTEROLOGY | Facility: CLINIC | Age: 62
End: 2022-05-31

## 2022-05-31 DIAGNOSIS — Z12.11 SCREENING FOR COLON CANCER: Primary | ICD-10-CM

## 2022-05-31 NOTE — TELEPHONE ENCOUNTER
Scheduled date of colonoscopy (as of today):6/7/22  Physician performing colonoscopy: Dr Deo Franco  Location of colonoscopy: [facility]  Bowel prep reviewed with patient: Radha  Instructions reviewed with patient by: Tj James  Clearances: none

## 2022-06-06 ENCOUNTER — ANESTHESIA (OUTPATIENT)
Dept: ANESTHESIOLOGY | Facility: AMBULATORY SURGERY CENTER | Age: 62
End: 2022-06-06

## 2022-06-06 ENCOUNTER — ANESTHESIA EVENT (OUTPATIENT)
Dept: ANESTHESIOLOGY | Facility: AMBULATORY SURGERY CENTER | Age: 62
End: 2022-06-06

## 2022-06-07 ENCOUNTER — HOSPITAL ENCOUNTER (OUTPATIENT)
Dept: GASTROENTEROLOGY | Facility: AMBULATORY SURGERY CENTER | Age: 62
Discharge: HOME | End: 2022-06-07
Payer: MEDICARE

## 2022-06-07 ENCOUNTER — ANESTHESIA (OUTPATIENT)
Dept: GASTROENTEROLOGY | Facility: AMBULATORY SURGERY CENTER | Age: 62
End: 2022-06-07

## 2022-06-07 ENCOUNTER — ANESTHESIA EVENT (OUTPATIENT)
Dept: GASTROENTEROLOGY | Facility: AMBULATORY SURGERY CENTER | Age: 62
End: 2022-06-07

## 2022-06-07 VITALS
HEIGHT: 69 IN | HEART RATE: 62 BPM | WEIGHT: 224 LBS | OXYGEN SATURATION: 96 % | SYSTOLIC BLOOD PRESSURE: 128 MMHG | DIASTOLIC BLOOD PRESSURE: 67 MMHG | BODY MASS INDEX: 33.18 KG/M2 | RESPIRATION RATE: 21 BRPM

## 2022-06-07 DIAGNOSIS — Z86.010 HISTORY OF COLON POLYPS: ICD-10-CM

## 2022-06-07 PROCEDURE — 45378 DIAGNOSTIC COLONOSCOPY: CPT | Performed by: INTERNAL MEDICINE

## 2022-06-07 RX ORDER — SODIUM CHLORIDE, SODIUM LACTATE, POTASSIUM CHLORIDE, CALCIUM CHLORIDE 600; 310; 30; 20 MG/100ML; MG/100ML; MG/100ML; MG/100ML
75 INJECTION, SOLUTION INTRAVENOUS CONTINUOUS
Status: DISCONTINUED | OUTPATIENT
Start: 2022-06-07 | End: 2022-06-11 | Stop reason: HOSPADM

## 2022-06-07 RX ORDER — SODIUM CHLORIDE, SODIUM LACTATE, POTASSIUM CHLORIDE, CALCIUM CHLORIDE 600; 310; 30; 20 MG/100ML; MG/100ML; MG/100ML; MG/100ML
INJECTION, SOLUTION INTRAVENOUS CONTINUOUS PRN
Status: DISCONTINUED | OUTPATIENT
Start: 2022-06-07 | End: 2022-06-07

## 2022-06-07 RX ORDER — PROPOFOL 10 MG/ML
INJECTION, EMULSION INTRAVENOUS AS NEEDED
Status: DISCONTINUED | OUTPATIENT
Start: 2022-06-07 | End: 2022-06-07

## 2022-06-07 RX ADMIN — SODIUM CHLORIDE, SODIUM LACTATE, POTASSIUM CHLORIDE, CALCIUM CHLORIDE 75 ML/HR: 600; 310; 30; 20 INJECTION, SOLUTION INTRAVENOUS at 12:59

## 2022-06-07 RX ADMIN — PROPOFOL 50 MG: 10 INJECTION, EMULSION INTRAVENOUS at 13:11

## 2022-06-07 RX ADMIN — PROPOFOL 100 MG: 10 INJECTION, EMULSION INTRAVENOUS at 13:03

## 2022-06-07 RX ADMIN — SODIUM CHLORIDE, SODIUM LACTATE, POTASSIUM CHLORIDE, CALCIUM CHLORIDE: 600; 310; 30; 20 INJECTION, SOLUTION INTRAVENOUS at 12:59

## 2022-06-07 RX ADMIN — PROPOFOL 50 MG: 10 INJECTION, EMULSION INTRAVENOUS at 13:05

## 2022-06-07 RX ADMIN — PROPOFOL 50 MG: 10 INJECTION, EMULSION INTRAVENOUS at 13:20

## 2022-06-07 NOTE — ANESTHESIA PREPROCEDURE EVALUATION
Procedure:  COLONOSCOPY    Relevant Problems   CARDIO   (+) Hyperlipidemia   (+) Hypertension   (+) Paroxysmal atrial fibrillation (HCC)      ENDO   (+) Subclinical hypothyroidism      /RENAL   (+) Benign prostatic hyperplasia      MUSCULOSKELETAL   (+) Osteoarthritis   (+) Primary osteoarthritis of right foot      NEURO/PSYCH   (+) Chronic schizophrenia (HCC)      PULMONARY   (+) Dyspnea   (+) Obstructive sleep apnea   Uses an oral  device for Sleep  Physical Exam    Airway    Mallampati score: II  TM Distance: >3 FB  Neck ROM: full     Dental   No notable dental hx     Cardiovascular      Pulmonary      Other Findings        Anesthesia Plan  ASA Score- 3     Anesthesia Type- IV sedation with anesthesia with ASA Monitors  Additional Monitors:   Airway Plan:           Plan Factors-Exercise tolerance (METS): >4 METS  Chart reviewed  Patient is not a current smoker  Patient not instructed to abstain from smoking on day of procedure  Patient did not smoke on day of surgery  Induction- intravenous  Postoperative Plan-     Informed Consent- Anesthetic plan and risks discussed with patient  I personally reviewed this patient with the CRNA  Discussed and agreed on the Anesthesia Plan with the CRNA  Frank Wang

## 2022-06-07 NOTE — ANESTHESIA POSTPROCEDURE EVALUATION
Post-Op Assessment Note    CV Status:  Stable    Pain management: adequate     Mental Status:  Lethargic and sleepy   Hydration Status:  Stable   PONV Controlled:  None   Airway Patency:  Patent      Post Op Vitals Reviewed: Yes      Staff: CRNA         No complications documented      BP   105/57   Temp      Pulse  58   Resp   14   SpO2   95

## 2022-06-07 NOTE — H&P
History and Physical - [facility]  Tahir Porter 57 y o  male MRN: [id number]    HPI: Tahir Porter is a 57y o  year old male who presents for colonoscopy for history of colon polyps    REVIEW OF SYSTEMS: Per the HPI, and otherwise unremarkable  Historical Information   Past Medical History:   Diagnosis Date    Atrial fibrillation ([facility] )     ablasion    Hypertension     Parkinsonism ([facility] )     Schizophrenia ([facility] )      Past Surgical History:   Procedure Laterality Date    AV NODE ABLATION      JOINT REPLACEMENT      knee, hip    TOTAL HIP ARTHROPLASTY Left 08/2006    TOTAL KNEE ARTHROPLASTY Right 09/17/2011     Social History   Social History     Substance and Sexual Activity   Alcohol Use No     Social History     Substance and Sexual Activity   Drug Use No     Social History     Tobacco Use   Smoking Status Never Smoker   Smokeless Tobacco Never Used     Family History   Problem Relation Age of Onset    Hypertension Mother     Stroke Family         syndrome     Cancer Father     Colon polyps Sister     Colon cancer Neg Hx        Meds/Allergies       Current Outpatient Medications:     aspirin 81 MG tablet    divalproex sodium (DEPAKOTE) 250 mg EC tablet    divalproex sodium (DEPAKOTE) 500 mg EC tablet    furosemide (LASIX) 40 mg tablet    gabapentin (NEURONTIN) 600 MG tablet    metoprolol succinate (TOPROL-XL) 50 mg 24 hr tablet    OLANZapine (ZyPREXA) 10 mg tablet    OLANZapine (ZyPREXA) 20 MG tablet    methylPREDNISolone 4 MG tablet therapy pack    polyethylene glycol (GOLYTELY) 4000 mL solution    Current Facility-Administered Medications:     lactated ringers infusion, 75 mL/hr, Intravenous, Continuous, 75 mL/hr at 06/07/22 1259    Allergies   Allergen Reactions    Haloperidol Hives and Rash     Neuroleptic Malignant Syndrome           Objective     /57   Pulse 58   Resp 14   Ht 5' 9" (1 753 m)   Wt 102 kg (224 lb)   SpO2 94%   BMI 33 08 kg/m² PHYSICAL EXAM    Gen: NAD AAOx3  Head: Normocephalic, Atraumatic  CV: S1S2 RRR no m/r/g  CHEST: Clear b/l no c/r/w  ABD: soft, +BS NT/ND  EXT: no edema    ASSESSMENT/PLAN:  This is a 57y o  year old male here for colonoscopy, and he is stable and optimized for his procedure

## 2022-06-07 NOTE — ANESTHESIA PREPROCEDURE EVALUATION
Procedure:  PRE-OP ONLY    Relevant Problems   CARDIO   (+) Hyperlipidemia   (+) Hypertension   (+) Paroxysmal atrial fibrillation (HCC)      ENDO   (+) Subclinical hypothyroidism      /RENAL   (+) Benign prostatic hyperplasia      MUSCULOSKELETAL   (+) Osteoarthritis   (+) Primary osteoarthritis of right foot      NEURO/PSYCH   (+) Chronic schizophrenia (HCC)      PULMONARY   (+) Dyspnea   (+) Obstructive sleep apnea             Anesthesia Plan  ASA Score- 3     Anesthesia Type- IV sedation with anesthesia with ASA Monitors  Additional Monitors:   Airway Plan:           Plan Factors-    Chart reviewed  Patient is not a current smoker  Patient not instructed to abstain from smoking on day of procedure  Patient did not smoke on day of surgery  Induction- intravenous  Postoperative Plan-     Informed Consent- Anesthetic plan and risks discussed with patient  I personally reviewed this patient with the CRNA  Discussed and agreed on the Anesthesia Plan with the CRNA  Micheal Jose

## 2022-09-27 ENCOUNTER — RA CDI HCC (OUTPATIENT)
Dept: OTHER | Facility: HOSPITAL | Age: 62
End: 2022-09-27

## 2022-09-27 NOTE — PROGRESS NOTES
[address]  coding opportunities       Chart reviewed, no opportunity found: CHART REVIEWED, NO OPPORTUNITY FOUND        Patients Insurance     Medicare Insurance: Medicare

## 2022-10-03 ENCOUNTER — OFFICE VISIT (OUTPATIENT)
Dept: FAMILY MEDICINE CLINIC | Facility: CLINIC | Age: 62
End: 2022-10-03
Payer: MEDICARE

## 2022-10-03 ENCOUNTER — APPOINTMENT (OUTPATIENT)
Dept: RADIOLOGY | Facility: CLINIC | Age: 62
End: 2022-10-03
Payer: MEDICARE

## 2022-10-03 VITALS
HEART RATE: 68 BPM | BODY MASS INDEX: 32.91 KG/M2 | TEMPERATURE: 97.8 F | HEIGHT: 69 IN | OXYGEN SATURATION: 97 % | WEIGHT: 222.2 LBS | SYSTOLIC BLOOD PRESSURE: 110 MMHG | RESPIRATION RATE: 20 BRPM | DIASTOLIC BLOOD PRESSURE: 74 MMHG

## 2022-10-03 DIAGNOSIS — I10 PRIMARY HYPERTENSION: Primary | ICD-10-CM

## 2022-10-03 DIAGNOSIS — Z23 NEED FOR INFLUENZA VACCINATION: ICD-10-CM

## 2022-10-03 DIAGNOSIS — E78.2 MIXED HYPERLIPIDEMIA: ICD-10-CM

## 2022-10-03 DIAGNOSIS — M79.632 LEFT FOREARM PAIN: ICD-10-CM

## 2022-10-03 DIAGNOSIS — E03.8 SUBCLINICAL HYPOTHYROIDISM: ICD-10-CM

## 2022-10-03 DIAGNOSIS — I48.0 PAROXYSMAL ATRIAL FIBRILLATION (HCC): ICD-10-CM

## 2022-10-03 DIAGNOSIS — F20.9 CHRONIC SCHIZOPHRENIA (HCC): ICD-10-CM

## 2022-10-03 PROCEDURE — 99214 OFFICE O/P EST MOD 30 MIN: CPT | Performed by: FAMILY MEDICINE

## 2022-10-03 PROCEDURE — 73090 X-RAY EXAM OF FOREARM: CPT

## 2022-10-03 PROCEDURE — G0008 ADMIN INFLUENZA VIRUS VAC: HCPCS

## 2022-10-03 PROCEDURE — 90682 RIV4 VACC RECOMBINANT DNA IM: CPT

## 2022-10-03 NOTE — PROGRESS NOTES
Assessment/Plan:  Problem List Items Addressed This Visit        Endocrine    Subclinical hypothyroidism     We will send the patient for the up-to-date thyroid levels as ordered  We will follow up closely with the results  Relevant Orders    T4, free    TSH, 3rd generation       Cardiovascular and Mediastinum    Hypertension - Primary     The patient's blood pressure is well controlled with his current medication  We have made no changes today  We will see him back in the office as scheduled and he will go for the lab work as ordered  Relevant Orders    T4, free    TSH, 3rd generation    Comprehensive metabolic panel    CBC and differential    Lipid panel    LDL cholesterol, direct    Paroxysmal atrial fibrillation ([facility] [address] )     The patient is currently stable and will continue to follow-up with his cardiologist as scheduled  Other    Chronic schizophrenia ([facility] [address] )     The patient continues to be stable on his current medication  He will continue to follow-up with his psychiatrist as scheduled  Relevant Orders    Valproic acid level, total    Hyperlipidemia    Relevant Orders    T4, free    TSH, 3rd generation    Comprehensive metabolic panel    CBC and differential    Lipid panel    LDL cholesterol, direct      Other Visit Diagnoses     Need for influenza vaccination        Relevant Orders    influenza vaccine, quadrivalent, recombinant, PF, 0 5 mL, for patients 18 yr+ (FLUBLOK) (Completed)    Left forearm pain        Relevant Orders    XR forearm 2 vw left      The patient will go for the testing as indicated and will follow up with the results  We will check the x-ray as a precaution for his chronic forearm pain  Return in about 6 months (around 4/3/2023) for Annual physical- AWV     I spent 20 minutes during the visit reviewing the history from the patient, performing the examination, discussing the findings with the patient, providing counseling and education, and making a plan  I spent 10 minutes ordering referrals and testing and documenting  Subjective:   Chief Complaint   Patient presents with    Follow-up     Check up 6 months hypertension        Patient ID: Chavo Argueta is a 60 y o  male presents today for a routine checkup  Chavo Argueta is a 60 y o  male presents today for follow-up of his hypertension, hyperlipidemia, and subclinical hypothyroidism  He is feeling well and doing well on his medication  There are no problems  His right foot is doing better  He is stable  He did see the [facility]  He does not need refills  The patient denies any chest pain, shortness of breath, or palpitations  There is no edema  There are no headaches or visual changes  There is no lightheadedness, dizziness, or fainting spells  There are no Gi problems  He injured his left forearm a few weeks ago when he was helping his daughter move  He has had this for over a year  He stranied the arm  It comes and goes with tension on the arm  There is no swelling  There is no change  There is no swelling and there is no limitations  There is no numbness or tingling  Hypertension  This is a chronic problem  The current episode started more than 1 year ago  The problem is unchanged  The problem is controlled  Pertinent negatives include no anxiety, blurred vision, chest pain, headaches, malaise/fatigue, neck pain, orthopnea, palpitations, peripheral edema, PND, shortness of breath or sweats  The current treatment provides significant improvement  There are no compliance problems        The following portions of the patient's history were reviewed and updated as appropriate: allergies, current medications, past family history, past medical history, past social history, past surgical history and problem list   Patient Active Problem List   Diagnosis    Paroxysmal atrial fibrillation ([address] )    Benign prostatic hyperplasia    Cataract of both eyes    Hyperlipidemia    Hypertension    Obesity    Obstructive sleep apnea    Osteoarthritis    Extrapyramidal syndrome    Chronic schizophrenia (HCC)    Stasis dermatitis    Venous insufficiency    Subclinical hypothyroidism    Dyspnea    Primary osteoarthritis of right foot    Osteophyte of right foot     Past Medical History:   Diagnosis Date    Atrial fibrillation (HCC)     ablasion    Hypertension     Parkinsonism (HCC)     Schizophrenia ([facility] )      Past Surgical History:   Procedure Laterality Date    AV NODE ABLATION      JOINT REPLACEMENT      knee, hip    TOTAL HIP ARTHROPLASTY Left 08/2006    TOTAL KNEE ARTHROPLASTY Right 09/17/2011     Allergies   Allergen Reactions    Haloperidol Hives and Rash     Neuroleptic Malignant Syndrome         Family History   Problem Relation Age of Onset    Hypertension Mother     Stroke Family         syndrome     Cancer Father     Colon polyps Sister     Colon cancer Neg Hx      Social History     Socioeconomic History    Marital status: /Civil Union     Spouse name: Not on file    Number of children: Not on file    Years of education: Not on file    Highest education level: Not on file   Occupational History    Not on file   Tobacco Use    Smoking status: Never Smoker    Smokeless tobacco: Never Used   Vaping Use    Vaping Use: Never used   Substance and Sexual Activity    Alcohol use: No    Drug use: No    Sexual activity: Not on file   Other Topics Concern    Not on file   Social History Narrative    Physical disability      Social Determinants of Health     Financial Resource Strain: Not on file   Food Insecurity: Not on file   Transportation Needs: Not on file   Physical Activity: Inactive    Days of Exercise per Week: 0 days    Minutes of Exercise per Session: 0 min   Stress: No Stress Concern Present    Feeling of Stress : Not at all   Social Connections: Not on file   Intimate Partner Violence: Not At Risk    Fear of Current or Ex-Partner: No    Emotionally Abused: No    Physically Abused: No    Sexually Abused: No   Housing Stability: Not on file     Current Outpatient Medications on File Prior to Visit   Medication Sig Dispense Refill    aspirin 81 MG tablet Take 1 tablet by mouth daily      divalproex sodium (DEPAKOTE) 250 mg EC tablet Take 250 mg by mouth every other day      divalproex sodium (DEPAKOTE) 500 mg EC tablet Take 500 mg by mouth 2 (two) times a day        furosemide (LASIX) 40 mg tablet Take 40 mg by mouth daily      gabapentin (NEURONTIN) 600 MG tablet Take 1,800 mg by mouth 2 tablets a m  1 p m       metoprolol succinate (TOPROL-XL) 50 mg 24 hr tablet Take 50 mg by mouth 2 (two) times a day      OLANZapine (ZyPREXA) 10 mg tablet Take 10 mg by mouth every evening       OLANZapine (ZyPREXA) 20 MG tablet Once daily to take with a 10mg tab      [DISCONTINUED] polyethylene glycol (GOLYTELY) 4000 mL solution Take 4,000 mL by mouth once for 1 dose 4000 mL 0     No current facility-administered medications on file prior to visit  Review of Systems   Constitutional: Negative  Negative for malaise/fatigue  HENT: Negative  Eyes: Negative  Negative for blurred vision  Respiratory: Negative  Negative for shortness of breath  Cardiovascular: Negative  Negative for chest pain, palpitations, orthopnea and PND  Gastrointestinal: Negative  Endocrine: Negative  Genitourinary: Negative  Musculoskeletal: Negative  Negative for neck pain  Skin: Negative  Allergic/Immunologic: Negative  Neurological: Negative  Negative for headaches  Hematological: Negative  Psychiatric/Behavioral: Negative          Objective:  Vitals:    10/03/22 1355 10/03/22 1440   BP: 120/80 110/74   BP Location: Left arm    Patient Position: Sitting    Cuff Size: Large    Pulse: 77 68   Resp: 20    Temp: 97 8 °F (36 6 °C)    TempSrc: Tympanic    SpO2: 97%    Weight: 101 kg (222 lb 3 2 oz)    Height: 5' 9" (1 753 m) Body mass index is 32 81 kg/m²  Physical Exam  Vitals and nursing note reviewed  Constitutional:       General: He is not in acute distress  Appearance: He is well-developed  He is not diaphoretic  Eyes:      Pupils: Pupils are equal, round, and reactive to light  Neck:      Thyroid: No thyromegaly  Vascular: No JVD  Trachea: No tracheal deviation  Cardiovascular:      Rate and Rhythm: Normal rate and regular rhythm  Heart sounds: Normal heart sounds  No murmur heard  No friction rub  No gallop  Pulmonary:      Effort: Pulmonary effort is normal  No respiratory distress  Breath sounds: Normal breath sounds  No stridor  No wheezing or rales  Chest:      Chest wall: No tenderness  Abdominal:      General: Bowel sounds are normal  There is no distension  Palpations: Abdomen is soft  There is no mass  Tenderness: There is no abdominal tenderness  There is no guarding or rebound  Musculoskeletal:         General: Normal range of motion  Cervical back: Normal range of motion and neck supple  Lymphadenopathy:      Cervical: No cervical adenopathy  Skin:     General: Skin is warm and dry  Coloration: Skin is not pale  Findings: No erythema or rash  Neurological:      Mental Status: He is alert and oriented to person, place, and time  Cranial Nerves: No cranial nerve deficit  Motor: No abnormal muscle tone  Coordination: Coordination normal       Deep Tendon Reflexes: Reflexes are normal and symmetric  Reflexes normal              Depression Screening and Follow-up Plan: Patient was screened for depression during today's encounter  They screened negative with a PHQ-2 score of 0

## 2022-10-04 ENCOUNTER — APPOINTMENT (OUTPATIENT)
Dept: LAB | Facility: CLINIC | Age: 62
End: 2022-10-04
Payer: MEDICARE

## 2022-10-04 DIAGNOSIS — E03.8 SUBCLINICAL HYPOTHYROIDISM: ICD-10-CM

## 2022-10-04 DIAGNOSIS — F20.9 CHRONIC SCHIZOPHRENIA (HCC): ICD-10-CM

## 2022-10-04 DIAGNOSIS — E78.2 MIXED HYPERLIPIDEMIA: ICD-10-CM

## 2022-10-04 DIAGNOSIS — I10 PRIMARY HYPERTENSION: ICD-10-CM

## 2022-10-04 LAB
ALBUMIN SERPL BCP-MCNC: 3.8 G/DL (ref 3.5–5)
ALP SERPL-CCNC: 58 U/L (ref 46–116)
ALT SERPL W P-5'-P-CCNC: 36 U/L (ref 12–78)
ANION GAP SERPL CALCULATED.3IONS-SCNC: 3 MMOL/L (ref 4–13)
AST SERPL W P-5'-P-CCNC: 24 U/L (ref 5–45)
BASOPHILS # BLD AUTO: 0.05 THOUSANDS/ΜL (ref 0–0.1)
BASOPHILS NFR BLD AUTO: 1 % (ref 0–1)
BILIRUB SERPL-MCNC: 0.47 MG/DL (ref 0.2–1)
BUN SERPL-MCNC: 17 MG/DL (ref 5–25)
CALCIUM SERPL-MCNC: 9 MG/DL (ref 8.3–10.1)
CHLORIDE SERPL-SCNC: 104 MMOL/L (ref 96–108)
CHOLEST SERPL-MCNC: 146 MG/DL
CO2 SERPL-SCNC: 30 MMOL/L (ref 21–32)
CREAT SERPL-MCNC: 1.3 MG/DL (ref 0.6–1.3)
EOSINOPHIL # BLD AUTO: 0.25 THOUSAND/ΜL (ref 0–0.61)
EOSINOPHIL NFR BLD AUTO: 4 % (ref 0–6)
ERYTHROCYTE [DISTWIDTH] IN BLOOD BY AUTOMATED COUNT: 13.1 % (ref 11.6–15.1)
GFR SERPL CREATININE-BSD FRML MDRD: 58 ML/MIN/1.73SQ M
GLUCOSE P FAST SERPL-MCNC: 99 MG/DL (ref 65–99)
HCT VFR BLD AUTO: 43.3 % (ref 36.5–49.3)
HDLC SERPL-MCNC: 37 MG/DL
HGB BLD-MCNC: 14.2 G/DL (ref 12–17)
IMM GRANULOCYTES # BLD AUTO: 0.03 THOUSAND/UL (ref 0–0.2)
IMM GRANULOCYTES NFR BLD AUTO: 1 % (ref 0–2)
LDLC SERPL CALC-MCNC: 68 MG/DL (ref 0–100)
LDLC SERPL DIRECT ASSAY-MCNC: 86 MG/DL (ref 0–100)
LYMPHOCYTES # BLD AUTO: 1.82 THOUSANDS/ΜL (ref 0.6–4.47)
LYMPHOCYTES NFR BLD AUTO: 32 % (ref 14–44)
MCH RBC QN AUTO: 29.5 PG (ref 26.8–34.3)
MCHC RBC AUTO-ENTMCNC: 32.8 G/DL (ref 31.4–37.4)
MCV RBC AUTO: 90 FL (ref 82–98)
MONOCYTES # BLD AUTO: 0.59 THOUSAND/ΜL (ref 0.17–1.22)
MONOCYTES NFR BLD AUTO: 10 % (ref 4–12)
NEUTROPHILS # BLD AUTO: 3 THOUSANDS/ΜL (ref 1.85–7.62)
NEUTS SEG NFR BLD AUTO: 52 % (ref 43–75)
NONHDLC SERPL-MCNC: 109 MG/DL
NRBC BLD AUTO-RTO: 0 /100 WBCS
PLATELET # BLD AUTO: 225 THOUSANDS/UL (ref 149–390)
PMV BLD AUTO: 9.4 FL (ref 8.9–12.7)
POTASSIUM SERPL-SCNC: 4.2 MMOL/L (ref 3.5–5.3)
PROT SERPL-MCNC: 7.5 G/DL (ref 6.4–8.4)
RBC # BLD AUTO: 4.81 MILLION/UL (ref 3.88–5.62)
SODIUM SERPL-SCNC: 137 MMOL/L (ref 135–147)
T4 FREE SERPL-MCNC: 0.95 NG/DL (ref 0.76–1.46)
TRIGL SERPL-MCNC: 204 MG/DL
TSH SERPL DL<=0.05 MIU/L-ACNC: 4 UIU/ML (ref 0.45–4.5)
VALPROATE SERPL-MCNC: 103 UG/ML (ref 50–100)
WBC # BLD AUTO: 5.74 THOUSAND/UL (ref 4.31–10.16)

## 2022-10-04 PROCEDURE — 85025 COMPLETE CBC W/AUTO DIFF WBC: CPT

## 2022-10-04 PROCEDURE — 84443 ASSAY THYROID STIM HORMONE: CPT

## 2022-10-04 PROCEDURE — 80164 ASSAY DIPROPYLACETIC ACD TOT: CPT

## 2022-10-04 PROCEDURE — 80053 COMPREHEN METABOLIC PANEL: CPT

## 2022-10-04 PROCEDURE — 80061 LIPID PANEL: CPT

## 2022-10-04 PROCEDURE — 84439 ASSAY OF FREE THYROXINE: CPT

## 2022-10-04 PROCEDURE — 36415 COLL VENOUS BLD VENIPUNCTURE: CPT

## 2022-10-04 PROCEDURE — 83721 ASSAY OF BLOOD LIPOPROTEIN: CPT

## 2022-10-04 NOTE — ASSESSMENT & PLAN NOTE
The patient's blood pressure is well controlled with his current medication  We have made no changes today  We will see him back in the office as scheduled and he will go for the lab work as ordered

## 2022-10-04 NOTE — ASSESSMENT & PLAN NOTE
The patient continues to be stable on his current medication  He will continue to follow-up with his psychiatrist as scheduled

## 2022-10-04 NOTE — ASSESSMENT & PLAN NOTE
We will send the patient for the up-to-date thyroid levels as ordered  We will follow up closely with the results

## 2023-05-12 ENCOUNTER — RA CDI HCC (OUTPATIENT)
Dept: OTHER | Facility: HOSPITAL | Age: 63
End: 2023-05-12

## 2023-05-24 ENCOUNTER — OFFICE VISIT (OUTPATIENT)
Dept: FAMILY MEDICINE CLINIC | Facility: CLINIC | Age: 63
End: 2023-05-24

## 2023-05-24 ENCOUNTER — HOSPITAL ENCOUNTER (OUTPATIENT)
Dept: RADIOLOGY | Facility: HOSPITAL | Age: 63
Discharge: HOME | End: 2023-05-24

## 2023-05-24 VITALS
TEMPERATURE: 99.7 F | BODY MASS INDEX: 32.17 KG/M2 | RESPIRATION RATE: 15 BRPM | OXYGEN SATURATION: 98 % | SYSTOLIC BLOOD PRESSURE: 120 MMHG | HEIGHT: 69 IN | DIASTOLIC BLOOD PRESSURE: 70 MMHG | WEIGHT: 217.2 LBS | HEART RATE: 65 BPM

## 2023-05-24 DIAGNOSIS — J01.00 ACUTE NON-RECURRENT MAXILLARY SINUSITIS: ICD-10-CM

## 2023-05-24 DIAGNOSIS — J20.9 ACUTE BRONCHITIS, UNSPECIFIED ORGANISM: ICD-10-CM

## 2023-05-24 DIAGNOSIS — J20.9 ACUTE BRONCHITIS, UNSPECIFIED ORGANISM: Primary | ICD-10-CM

## 2023-05-24 RX ORDER — BENZONATATE 100 MG/1
100 CAPSULE ORAL 3 TIMES DAILY PRN
Qty: 20 CAPSULE | Refills: 0 | Status: SHIPPED | OUTPATIENT
Start: 2023-05-24

## 2023-05-24 RX ORDER — METOPROLOL SUCCINATE 25 MG/1
TABLET, EXTENDED RELEASE ORAL
COMMUNITY
Start: 2023-05-22

## 2023-05-24 RX ORDER — DOXYCYCLINE HYCLATE 100 MG/1
100 CAPSULE ORAL EVERY 12 HOURS SCHEDULED
Qty: 20 CAPSULE | Refills: 0 | Status: SHIPPED | OUTPATIENT
Start: 2023-05-24 | End: 2023-06-03

## 2023-05-24 NOTE — PROGRESS NOTES
Assessment/Plan:  Problem List Items Addressed This Visit    None  Visit Diagnoses     Acute bronchitis, unspecified organism    -  Primary    Relevant Medications    doxycycline hyclate (VIBRAMYCIN) 100 mg capsule    benzonatate (TESSALON PERLES) 100 mg capsule    Other Relevant Orders    XR chest pa & lateral (Completed)    Acute non-recurrent maxillary sinusitis        Relevant Medications    doxycycline hyclate (VIBRAMYCIN) 100 mg capsule    benzonatate (TESSALON PERLES) 100 mg capsule    Other Relevant Orders    XR chest pa & lateral (Completed)        I suspect that the patient has acute bronchitis and sinusitis  We will treat him with the medication as ordered  He will increase his fluids and rest   We will send him for the chest x-ray as ordered  We will follow-up closely with the results when available  He will call with any worsening symptoms  Return if symptoms worsen or fail to improve  I spent 15 minutes during the visit reviewing the history from the patient, performing the examination, discussing the findings with the patient, providing counseling and education, and making a plan  I spent 15 minutes ordering referrals and testing and documenting  Subjective:   Chief Complaint   Patient presents with   • Fever   • COVID-19     Tested at home negative     • Cough   • Generalized Body Aches               Patient ID: Alessio Wells is a 60 y o  male presents today for evaluation of cold symptoms  Alessio Wells is a 60 y o  male who has been sick for 6 days  He has a deep cough and night sweats  He is running fevers  He has been running low grade fevers  There is nasal congestion and rhinorrhea  There is PND  There is no sinus pressure  There is a sore throat  He did a covid test 2 days ago - negative  There is no vomiting  There is decreased appetite  There is no diarrhea  Cough  This is a new problem  The current episode started in the past 7 days   The problem has been gradually worsening  The problem occurs constantly  The cough is productive of sputum  Associated symptoms include a fever, nasal congestion, postnasal drip, rhinorrhea and shortness of breath  Pertinent negatives include no chest pain, chills, ear congestion, ear pain, headaches, heartburn, hemoptysis, myalgias, rash, sore throat, sweats, weight loss or wheezing  The following portions of the patient's history were reviewed and updated as appropriate: allergies, current medications, past family history, past medical history, past social history, past surgical history and problem list   Patient Active Problem List   Diagnosis   • Paroxysmal atrial fibrillation (HCC)   • Benign prostatic hyperplasia   • Cataract of both eyes   • Hyperlipidemia   • Hypertension   • Obesity   • Obstructive sleep apnea   • Osteoarthritis   • Extrapyramidal syndrome   • Chronic schizophrenia (HCC)   • Stasis dermatitis   • Venous insufficiency   • Subclinical hypothyroidism   • Dyspnea   • Primary osteoarthritis of right foot   • Osteophyte of right foot     Past Medical History:   Diagnosis Date   • Atrial fibrillation ([facility] )     ablasion   • Hypertension    • Parkinsonism ([facility] )    • Schizophrenia ([facility] )      Past Surgical History:   Procedure Laterality Date   • AV NODE ABLATION     • JOINT REPLACEMENT      knee, hip   • TOTAL HIP ARTHROPLASTY Left 08/2006   • TOTAL KNEE ARTHROPLASTY Right 09/17/2011     Allergies   Allergen Reactions   • Haloperidol Hives and Rash     Neuroleptic Malignant Syndrome         Family History   Problem Relation Age of Onset   • Hypertension Mother    • Stroke Family         syndrome    • Cancer Father    • Colon polyps Sister    • Colon cancer Neg Hx      Social History     Socioeconomic History   • Marital status: /Civil Union     Spouse name: Not on file   • Number of children: Not on file   • Years of education: Not on file   • Highest education level: Not on file   Occupational History   • Not on file   Tobacco Use   • Smoking status: Never   • Smokeless tobacco: Never   Vaping Use   • Vaping Use: Never used   Substance and Sexual Activity   • Alcohol use: No   • Drug use: No   • Sexual activity: Not on file   Other Topics Concern   • Not on file   Social History Narrative    Physical disability      Social Determinants of Health     Financial Resource Strain: Not on file   Food Insecurity: Not on file   Transportation Needs: Not on file   Physical Activity: Inactive (4/29/2022)    Exercise Vital Sign    • Days of Exercise per Week: 0 days    • Minutes of Exercise per Session: 0 min   Stress: No Stress Concern Present (4/29/2022)    [address]    • Feeling of Stress : Not at all   Social Connections: Unknown (2/10/2021)    Social Connection and Isolation Panel [NHANES]    • Frequency of Communication with Friends and Family: Not on file    • Frequency of Social Gatherings with Friends and Family: Not on file    • Attends Holiness Services: Not on file    • Active Member of Clubs or Organizations: Not on file    • Attends Club or Organization Meetings: Not on file    • Marital Status:    Intimate Partner Violence: Not At Risk (10/3/2022)    Humiliation, Afraid, Rape, and Kick questionnaire    • Fear of Current or Ex-Partner: No    • Emotionally Abused: No    • Physically Abused: No    • Sexually Abused: No   Housing Stability: Not on file     Current Outpatient Medications on File Prior to Visit   Medication Sig Dispense Refill   • aspirin 81 MG tablet Take 1 tablet by mouth daily     • divalproex sodium (DEPAKOTE) 250 mg EC tablet Take 250 mg by mouth every other day     • divalproex sodium (DEPAKOTE) 500 mg EC tablet Take 500 mg by mouth 2 (two) times a day       • furosemide (LASIX) 40 mg tablet Take 40 mg by mouth daily     • gabapentin (NEURONTIN) 600 MG tablet Take 1,800 mg by mouth 2 tablets a m  1 p m      • metoprolol "succinate (TOPROL-XL) 25 mg 24 hr tablet      • metoprolol succinate (TOPROL-XL) 50 mg 24 hr tablet Take 50 mg by mouth 2 (two) times a day     • OLANZapine (ZyPREXA) 10 mg tablet Take 10 mg by mouth every evening      • OLANZapine (ZyPREXA) 20 MG tablet Once daily to take with a 10mg tab     • [DISCONTINUED] polyethylene glycol (GOLYTELY) 4000 mL solution Take 4,000 mL by mouth once for 1 dose 4000 mL 0     No current facility-administered medications on file prior to visit  Review of Systems   Constitutional: Positive for fever  Negative for chills and weight loss  HENT: Positive for postnasal drip and rhinorrhea  Negative for ear pain and sore throat  Eyes: Negative  Respiratory: Positive for cough and shortness of breath  Negative for hemoptysis and wheezing  Cardiovascular: Negative  Negative for chest pain  Gastrointestinal: Negative  Negative for heartburn  Endocrine: Negative  Genitourinary: Negative  Musculoskeletal: Negative  Negative for myalgias  Skin: Negative  Negative for rash  Allergic/Immunologic: Negative  Neurological: Negative  Negative for headaches  Hematological: Negative  Psychiatric/Behavioral: Negative  Objective:  Vitals:    05/24/23 1431   BP: 120/70   Pulse: 65   Resp: 15   Temp: 99 7 °F (37 6 °C)   TempSrc: Tympanic   SpO2: 98%   Weight: 98 5 kg (217 lb 3 2 oz)   Height: 5' 9\" (1 753 m)     Body mass index is 32 07 kg/m²  Physical Exam  Constitutional:       Appearance: He is well-developed  HENT:      Nose: Mucosal edema, congestion and rhinorrhea present  Right Sinus: Maxillary sinus tenderness present  Left Sinus: Maxillary sinus tenderness present  Eyes:      Pupils: Pupils are equal, round, and reactive to light  Cardiovascular:      Rate and Rhythm: Normal rate and regular rhythm  Heart sounds: No murmur heard  No friction rub  No gallop     Pulmonary:      Effort: Pulmonary effort is normal  No " respiratory distress  Breath sounds: Normal breath sounds  No wheezing or rales  Chest:      Chest wall: No tenderness  Abdominal:      General: Bowel sounds are normal  There is no distension  Palpations: Abdomen is soft  There is no mass  Tenderness: There is no abdominal tenderness  There is no guarding or rebound  Musculoskeletal:      Cervical back: Normal range of motion and neck supple  Lymphadenopathy:      Cervical: Cervical adenopathy present

## 2023-05-25 ENCOUNTER — TELEPHONE (OUTPATIENT)
Dept: FAMILY MEDICINE CLINIC | Facility: CLINIC | Age: 63
End: 2023-05-25

## 2023-05-25 NOTE — TELEPHONE ENCOUNTER
----- Message from Kam Locke DO sent at 5/25/2023 12:01 PM EDT -----  Please let the patient know that his chest x-ray was negative for pneumonia- he should take the antibiotics as prescribed and we will follow-up with him as needed

## 2023-06-08 ENCOUNTER — TELEPHONE (OUTPATIENT)
Dept: FAMILY MEDICINE CLINIC | Facility: CLINIC | Age: 63
End: 2023-06-08

## 2023-06-08 DIAGNOSIS — J20.9 ACUTE BRONCHITIS, UNSPECIFIED ORGANISM: ICD-10-CM

## 2023-06-08 DIAGNOSIS — J01.00 ACUTE NON-RECURRENT MAXILLARY SINUSITIS: ICD-10-CM

## 2023-06-08 RX ORDER — BENZONATATE 100 MG/1
100 CAPSULE ORAL 3 TIMES DAILY PRN
Qty: 20 CAPSULE | Refills: 0 | Status: SHIPPED | OUTPATIENT
Start: 2023-06-08

## 2023-06-08 NOTE — TELEPHONE ENCOUNTER
Margarette Decker called to see if you can refill his benzonatate (TESSALON PERLES) 100 mg capsule  He is feeling better but is out of them and still has a cough  He uses [facility] DR JIMMY MCCLAIN  Please advise  Thank you

## 2023-11-28 ENCOUNTER — TELEPHONE (OUTPATIENT)
Dept: FAMILY MEDICINE CLINIC | Facility: CLINIC | Age: 63
End: 2023-11-28

## 2023-12-18 ENCOUNTER — OFFICE VISIT (OUTPATIENT)
Dept: FAMILY MEDICINE CLINIC | Facility: CLINIC | Age: 63
End: 2023-12-18
Payer: MEDICARE

## 2023-12-18 VITALS
TEMPERATURE: 97.9 F | BODY MASS INDEX: 31.9 KG/M2 | HEIGHT: 69 IN | RESPIRATION RATE: 18 BRPM | SYSTOLIC BLOOD PRESSURE: 130 MMHG | HEART RATE: 82 BPM | OXYGEN SATURATION: 97 % | WEIGHT: 215.4 LBS | DIASTOLIC BLOOD PRESSURE: 82 MMHG

## 2023-12-18 DIAGNOSIS — F20.9 CHRONIC SCHIZOPHRENIA (HCC): Primary | ICD-10-CM

## 2023-12-18 DIAGNOSIS — Z12.5 SCREENING FOR PROSTATE CANCER: ICD-10-CM

## 2023-12-18 DIAGNOSIS — E78.2 MIXED HYPERLIPIDEMIA: ICD-10-CM

## 2023-12-18 DIAGNOSIS — I10 PRIMARY HYPERTENSION: ICD-10-CM

## 2023-12-18 DIAGNOSIS — E03.8 SUBCLINICAL HYPOTHYROIDISM: ICD-10-CM

## 2023-12-18 PROCEDURE — 99214 OFFICE O/P EST MOD 30 MIN: CPT | Performed by: FAMILY MEDICINE

## 2023-12-18 NOTE — ASSESSMENT & PLAN NOTE
The patient is stable on the current dose of his Zyprexa and Depakote.  We will continue to manage his prescriptions until he establishes this with a new psychiatrist.  He is having no problems at all.  We will continue to monitor him closely.  We will see him back as scheduled for his Medicare wellness visit and he will get the lab work done prior to that visit.

## 2023-12-18 NOTE — ASSESSMENT & PLAN NOTE
The patient will go for the up-to-date lipid panel as ordered prior to his Medicare wellness visit.  He will continue to work on his diet and exercise.  We will see him back in the office as scheduled.

## 2023-12-18 NOTE — PROGRESS NOTES
Assessment/Plan:  Problem List Items Addressed This Visit        Endocrine    Subclinical hypothyroidism     The patient is currently stable on his medication.  He will go for the up-to-date lab work as ordered prior to his next visit.  We will see him back as scheduled.         Relevant Orders    TSH, 3rd generation with Free T4 reflex       Cardiovascular and Mediastinum    Hypertension     The patient's blood pressure stable on his current medication.  We have made no changes today.  We will see him back in the office as scheduled.         Relevant Orders    CBC and differential    Comprehensive metabolic panel    LDL cholesterol, direct    Lipid panel    TSH, 3rd generation with Free T4 reflex    UA (URINE) with reflex to Scope       Other    Chronic schizophrenia (HCC) - Primary     The patient is stable on the current dose of his Zyprexa and Depakote.  We will continue to manage his prescriptions until he establishes this with a new psychiatrist.  He is having no problems at all.  We will continue to monitor him closely.  We will see him back as scheduled for his Medicare wellness visit and he will get the lab work done prior to that visit.         Relevant Orders    CBC and differential    Comprehensive metabolic panel    LDL cholesterol, direct    Lipid panel    TSH, 3rd generation with Free T4 reflex    UA (URINE) with reflex to Scope    Hyperlipidemia     The patient will go for the up-to-date lipid panel as ordered prior to his Medicare wellness visit.  He will continue to work on his diet and exercise.  We will see him back in the office as scheduled.         Relevant Orders    CBC and differential    Comprehensive metabolic panel    LDL cholesterol, direct    Lipid panel    TSH, 3rd generation with Free T4 reflex    UA (URINE) with reflex to Scope   Other Visit Diagnoses     Screening for prostate cancer        Relevant Orders    PSA, Total Screen          Return in about 3 months (around 3/18/2024) for  Annual physical-AWV, Annual physical.   I spent 15 minutes during the visit reviewing the history from the patient, performing the examination, discussing the findings with the patient, providing counseling and education, and making a plan. I spent 15 minutes ordering referrals and testing and documenting.    Subjective:   Chief Complaint   Patient presents with   • Medication Management     For Mental Health        Patient ID: Zeke Colunga is a 63 y.o. male presents today for routine checkup.  Zeke Colunga is a 63 y.o. male who presents today for follow-up of his chronic schizophrenia, hypertension, hyperlipidemia, and hypothyroidism.  His psychiatrist Dr. Beebe is retiring on 1/01/2024.  He is looking for a new mental health provider.  He is doing well on the medications. He is doing well on the Depakote and the Zyprexa.  He had his last visit with psych in November 2023 and he refilled the medication.    He gets 90 day supplies of the medication.    He is asking us to take over management.    He is looking into getting all the medications refilled in February 2023.    The patient denies any chest pain, shortness of breath, or palpitations.  There is no edema.  There are no headaches or visual changes.  There is no lightheadedness, dizziness, or fainting spells.  The patient currently denies any nausea, vomiting, or GERD symptoms.  he has normal bowel movements and normal urine output.  he has a normal appetite.  He is seeing the Cardiologist in January 2024.        Hypertension  This is a chronic problem. The current episode started more than 1 year ago. The problem is unchanged. The problem is controlled. Pertinent negatives include no anxiety, blurred vision, chest pain, headaches, malaise/fatigue, neck pain, orthopnea, palpitations, peripheral edema, PND, shortness of breath or sweats.     The following portions of the patient's history were reviewed and updated as appropriate: allergies, current  medications, past family history, past medical history, past social history, past surgical history and problem list.  Patient Active Problem List   Diagnosis   • Paroxysmal atrial fibrillation (HCC)   • Benign prostatic hyperplasia   • Cataract of both eyes   • Hyperlipidemia   • Hypertension   • Obesity   • Obstructive sleep apnea   • Osteoarthritis   • Extrapyramidal syndrome   • Chronic schizophrenia (HCC)   • Stasis dermatitis   • Venous insufficiency   • Subclinical hypothyroidism   • Dyspnea   • Primary osteoarthritis of right foot   • Osteophyte of right foot     Past Medical History:   Diagnosis Date   • Atrial fibrillation (HCC)     ablasion   • Hypertension    • Parkinsonism    • Schizophrenia (HCC)      Past Surgical History:   Procedure Laterality Date   • AV NODE ABLATION     • JOINT REPLACEMENT      knee, hip   • TOTAL HIP ARTHROPLASTY Left 08/2006   • TOTAL KNEE ARTHROPLASTY Right 09/17/2011     Allergies   Allergen Reactions   • Haloperidol Hives and Rash     Neuroleptic Malignant Syndrome.       Family History   Problem Relation Age of Onset   • Hypertension Mother    • Stroke Family         syndrome    • Cancer Father    • Colon polyps Sister    • Colon cancer Neg Hx      Social History     Socioeconomic History   • Marital status: /Civil Union     Spouse name: Not on file   • Number of children: Not on file   • Years of education: Not on file   • Highest education level: Not on file   Occupational History   • Not on file   Tobacco Use   • Smoking status: Never     Passive exposure: Never   • Smokeless tobacco: Never   Vaping Use   • Vaping status: Never Used   Substance and Sexual Activity   • Alcohol use: No   • Drug use: No   • Sexual activity: Not on file   Other Topics Concern   • Not on file   Social History Narrative    Physical disability      Social Determinants of Health     Financial Resource Strain: Not on file   Food Insecurity: Not on file   Transportation Needs: Not on file    Physical Activity: Inactive (4/29/2022)    Exercise Vital Sign    • Days of Exercise per Week: 0 days    • Minutes of Exercise per Session: 0 min   Stress: No Stress Concern Present (4/29/2022)    [employer] - Occupational Stress Questionnaire    • Feeling of Stress : Not at all   Social Connections: Unknown (2/10/2021)    Social Connection and Isolation Panel [NHANES]    • Frequency of Communication with Friends and Family: Not on file    • Frequency of Social Gatherings with Friends and Family: Not on file    • Attends Islam Services: Not on file    • Active Member of Clubs or Organizations: Not on file    • Attends Club or Organization Meetings: Not on file    • Marital Status:    Intimate Partner Violence: Not At Risk (12/18/2023)    Humiliation, Afraid, Rape, and Kick questionnaire    • Fear of Current or Ex-Partner: No    • Emotionally Abused: No    • Physically Abused: No    • Sexually Abused: No   Housing Stability: Not on file     Current Outpatient Medications on File Prior to Visit   Medication Sig Dispense Refill   • aspirin 81 MG tablet Take 1 tablet by mouth daily     • divalproex sodium (DEPAKOTE) 250 mg EC tablet Take 250 mg by mouth every other day     • divalproex sodium (DEPAKOTE) 500 mg EC tablet Take 500 mg by mouth 2 (two) times a day       • furosemide (LASIX) 40 mg tablet Take 40 mg by mouth daily     • gabapentin (NEURONTIN) 600 MG tablet Take 1,800 mg by mouth 2 tablets a.m. 1 p.m.     • metoprolol succinate (TOPROL-XL) 25 mg 24 hr tablet 25 mg 2 (two) times a day     • OLANZapine (ZyPREXA) 10 mg tablet Take 10 mg by mouth every evening      • OLANZapine (ZyPREXA) 20 MG tablet Once daily to take with a 10mg tab     • [DISCONTINUED] benzonatate (TESSALON PERLES) 100 mg capsule Take 1 capsule (100 mg total) by mouth 3 (three) times a day as needed for cough (Patient not taking: Reported on 12/18/2023) 20 capsule 0   • [DISCONTINUED] metoprolol  "succinate (TOPROL-XL) 50 mg 24 hr tablet Take 50 mg by mouth 2 (two) times a day (Patient not taking: Reported on 12/18/2023)     • [DISCONTINUED] polyethylene glycol (GOLYTELY) 4000 mL solution Take 4,000 mL by mouth once for 1 dose 4000 mL 0     No current facility-administered medications on file prior to visit.     Review of Systems   Constitutional: Negative.  Negative for malaise/fatigue.   HENT: Negative.     Eyes: Negative.  Negative for blurred vision.   Respiratory: Negative.  Negative for shortness of breath.    Cardiovascular: Negative.  Negative for chest pain, palpitations, orthopnea and PND.   Gastrointestinal: Negative.    Endocrine: Negative.    Genitourinary: Negative.    Musculoskeletal: Negative.  Negative for neck pain.   Skin: Negative.    Allergic/Immunologic: Negative.    Neurological: Negative.  Negative for headaches.   Hematological: Negative.    Psychiatric/Behavioral: Negative.         Objective:  Vitals:    12/18/23 0935   BP: 130/82   BP Location: Left arm   Patient Position: Sitting   Pulse: 82   Resp: 18   Temp: 97.9 °F (36.6 °C)   TempSrc: Tympanic   SpO2: 97%   Weight: 97.7 kg (215 lb 6.4 oz)   Height: 5' 9\" (1.753 m)     Body mass index is 31.81 kg/m².     Physical Exam  Vitals and nursing note reviewed.   Constitutional:       General: He is not in acute distress.     Appearance: Normal appearance. He is well-developed. He is not diaphoretic.   HENT:      Head: Normocephalic and atraumatic.      Right Ear: Tympanic membrane, ear canal and external ear normal.      Left Ear: Tympanic membrane, ear canal and external ear normal.      Nose: No congestion or rhinorrhea.      Mouth/Throat:      Mouth: Mucous membranes are moist.      Pharynx: Oropharynx is clear.   Eyes:      Extraocular Movements: Extraocular movements intact.      Pupils: Pupils are equal, round, and reactive to light.   Neck:      Thyroid: No thyromegaly.      Vascular: No JVD.      Trachea: No tracheal deviation. "   Cardiovascular:      Rate and Rhythm: Normal rate and regular rhythm.      Heart sounds: Normal heart sounds. No murmur heard.     No friction rub. No gallop.   Pulmonary:      Effort: Pulmonary effort is normal. No respiratory distress.      Breath sounds: Normal breath sounds. No stridor. No wheezing or rales.   Chest:      Chest wall: No tenderness.   Abdominal:      General: Bowel sounds are normal. There is no distension.      Palpations: Abdomen is soft. There is no mass.      Tenderness: There is no abdominal tenderness. There is no guarding or rebound.   Musculoskeletal:         General: Normal range of motion.      Cervical back: Normal range of motion and neck supple.   Lymphadenopathy:      Cervical: No cervical adenopathy.   Skin:     General: Skin is warm and dry.      Coloration: Skin is not pale.      Findings: No erythema or rash.   Neurological:      Mental Status: He is alert and oriented to person, place, and time.      Cranial Nerves: No cranial nerve deficit.      Motor: No abnormal muscle tone.      Coordination: Coordination normal.      Deep Tendon Reflexes: Reflexes are normal and symmetric. Reflexes normal.           BMI Counseling: Body mass index is 31.81 kg/m². The BMI is above normal. Nutrition recommendations include decreasing portion sizes, encouraging healthy choices of fruits and vegetables, decreasing fast food intake, consuming healthier snacks, limiting drinks that contain sugar, moderation in carbohydrate intake, increasing intake of lean protein, reducing intake of saturated and trans fat and reducing intake of cholesterol. Exercise recommendations include exercising 3-5 times per week. No pharmacotherapy was ordered. Patient referred to PCP. Rationale for BMI follow-up plan is due to patient being overweight or obese.     Depression Screening and Follow-up Plan: Patient was screened for depression during today's encounter. They screened negative with a PHQ-2 score of  0.

## 2023-12-18 NOTE — ASSESSMENT & PLAN NOTE
The patient is currently stable on his medication.  He will go for the up-to-date lab work as ordered prior to his next visit.  We will see him back as scheduled.

## 2023-12-18 NOTE — ASSESSMENT & PLAN NOTE
The patient's blood pressure stable on his current medication.  We have made no changes today.  We will see him back in the office as scheduled.

## 2023-12-19 ENCOUNTER — APPOINTMENT (OUTPATIENT)
Dept: LAB | Facility: CLINIC | Age: 63
End: 2023-12-19
Payer: MEDICARE

## 2023-12-19 DIAGNOSIS — I10 PRIMARY HYPERTENSION: ICD-10-CM

## 2023-12-19 DIAGNOSIS — Z12.5 SCREENING FOR PROSTATE CANCER: ICD-10-CM

## 2023-12-19 DIAGNOSIS — E03.8 SUBCLINICAL HYPOTHYROIDISM: ICD-10-CM

## 2023-12-19 DIAGNOSIS — E78.2 MIXED HYPERLIPIDEMIA: ICD-10-CM

## 2023-12-19 DIAGNOSIS — F20.9 CHRONIC SCHIZOPHRENIA (HCC): ICD-10-CM

## 2023-12-19 LAB
ALBUMIN SERPL BCP-MCNC: 4.2 G/DL (ref 3.5–5)
ALP SERPL-CCNC: 47 U/L (ref 34–104)
ALT SERPL W P-5'-P-CCNC: 21 U/L (ref 7–52)
ANION GAP SERPL CALCULATED.3IONS-SCNC: 5 MMOL/L
AST SERPL W P-5'-P-CCNC: 25 U/L (ref 13–39)
BACTERIA UR QL AUTO: NORMAL /HPF
BASOPHILS # BLD AUTO: 0.06 THOUSANDS/ÂΜL (ref 0–0.1)
BASOPHILS NFR BLD AUTO: 1 % (ref 0–1)
BILIRUB SERPL-MCNC: 0.82 MG/DL (ref 0.2–1)
BILIRUB UR QL STRIP: NEGATIVE
BUN SERPL-MCNC: 16 MG/DL (ref 5–25)
CALCIUM SERPL-MCNC: 8.9 MG/DL (ref 8.4–10.2)
CHLORIDE SERPL-SCNC: 103 MMOL/L (ref 96–108)
CHOLEST SERPL-MCNC: 145 MG/DL
CLARITY UR: CLEAR
CO2 SERPL-SCNC: 32 MMOL/L (ref 21–32)
COLOR UR: ABNORMAL
CREAT SERPL-MCNC: 1.16 MG/DL (ref 0.6–1.3)
EOSINOPHIL # BLD AUTO: 0.38 THOUSAND/ÂΜL (ref 0–0.61)
EOSINOPHIL NFR BLD AUTO: 6 % (ref 0–6)
ERYTHROCYTE [DISTWIDTH] IN BLOOD BY AUTOMATED COUNT: 12.6 % (ref 11.6–15.1)
GFR SERPL CREATININE-BSD FRML MDRD: 66 ML/MIN/1.73SQ M
GLUCOSE P FAST SERPL-MCNC: 90 MG/DL (ref 65–99)
GLUCOSE UR STRIP-MCNC: NEGATIVE MG/DL
HCT VFR BLD AUTO: 41.9 % (ref 36.5–49.3)
HDLC SERPL-MCNC: 37 MG/DL
HGB BLD-MCNC: 14.1 G/DL (ref 12–17)
HGB UR QL STRIP.AUTO: NEGATIVE
IMM GRANULOCYTES # BLD AUTO: 0.04 THOUSAND/UL (ref 0–0.2)
IMM GRANULOCYTES NFR BLD AUTO: 1 % (ref 0–2)
KETONES UR STRIP-MCNC: NEGATIVE MG/DL
LDLC SERPL CALC-MCNC: 72 MG/DL (ref 0–100)
LDLC SERPL DIRECT ASSAY-MCNC: 93 MG/DL (ref 0–100)
LEUKOCYTE ESTERASE UR QL STRIP: NEGATIVE
LYMPHOCYTES # BLD AUTO: 2.28 THOUSANDS/ÂΜL (ref 0.6–4.47)
LYMPHOCYTES NFR BLD AUTO: 36 % (ref 14–44)
MCH RBC QN AUTO: 30.4 PG (ref 26.8–34.3)
MCHC RBC AUTO-ENTMCNC: 33.7 G/DL (ref 31.4–37.4)
MCV RBC AUTO: 90 FL (ref 82–98)
MONOCYTES # BLD AUTO: 0.67 THOUSAND/ÂΜL (ref 0.17–1.22)
MONOCYTES NFR BLD AUTO: 11 % (ref 4–12)
NEUTROPHILS # BLD AUTO: 2.95 THOUSANDS/ÂΜL (ref 1.85–7.62)
NEUTS SEG NFR BLD AUTO: 45 % (ref 43–75)
NITRITE UR QL STRIP: NEGATIVE
NON-SQ EPI CELLS URNS QL MICRO: NORMAL /HPF
NONHDLC SERPL-MCNC: 108 MG/DL
NRBC BLD AUTO-RTO: 0 /100 WBCS
PH UR STRIP.AUTO: 6 [PH]
PLATELET # BLD AUTO: 244 THOUSANDS/UL (ref 149–390)
PMV BLD AUTO: 10.1 FL (ref 8.9–12.7)
POTASSIUM SERPL-SCNC: 4.3 MMOL/L (ref 3.5–5.3)
PROT SERPL-MCNC: 6.9 G/DL (ref 6.4–8.4)
PROT UR STRIP-MCNC: ABNORMAL MG/DL
PSA SERPL-MCNC: 1.68 NG/ML (ref 0–4)
RBC # BLD AUTO: 4.64 MILLION/UL (ref 3.88–5.62)
RBC #/AREA URNS AUTO: NORMAL /HPF
SODIUM SERPL-SCNC: 140 MMOL/L (ref 135–147)
SP GR UR STRIP.AUTO: 1.02 (ref 1–1.03)
TRIGL SERPL-MCNC: 180 MG/DL
TSH SERPL DL<=0.05 MIU/L-ACNC: 3.49 UIU/ML (ref 0.45–4.5)
UROBILINOGEN UR STRIP-ACNC: <2 MG/DL
WBC # BLD AUTO: 6.38 THOUSAND/UL (ref 4.31–10.16)
WBC #/AREA URNS AUTO: NORMAL /HPF

## 2023-12-19 PROCEDURE — 84443 ASSAY THYROID STIM HORMONE: CPT

## 2023-12-19 PROCEDURE — 85025 COMPLETE CBC W/AUTO DIFF WBC: CPT

## 2023-12-19 PROCEDURE — 81001 URINALYSIS AUTO W/SCOPE: CPT

## 2023-12-19 PROCEDURE — 80061 LIPID PANEL: CPT

## 2023-12-19 PROCEDURE — 80053 COMPREHEN METABOLIC PANEL: CPT

## 2023-12-19 PROCEDURE — 83721 ASSAY OF BLOOD LIPOPROTEIN: CPT

## 2023-12-19 PROCEDURE — G0103 PSA SCREENING: HCPCS

## 2023-12-19 PROCEDURE — 36415 COLL VENOUS BLD VENIPUNCTURE: CPT

## 2023-12-21 ENCOUNTER — TELEPHONE (OUTPATIENT)
Dept: FAMILY MEDICINE CLINIC | Facility: CLINIC | Age: 63
End: 2023-12-21

## 2023-12-21 DIAGNOSIS — Z51.81 MEDICATION MONITORING ENCOUNTER: ICD-10-CM

## 2023-12-21 DIAGNOSIS — F20.9 CHRONIC SCHIZOPHRENIA (HCC): Primary | ICD-10-CM

## 2023-12-21 NOTE — TELEPHONE ENCOUNTER
Patient called. Patient states they got their blood work results and realized there was no order for the valproic acid level.    Patient was wondering if you could place an order for downstairs for this so he can get it done.    Please advise. Thank you

## 2023-12-22 ENCOUNTER — APPOINTMENT (OUTPATIENT)
Dept: LAB | Facility: CLINIC | Age: 63
End: 2023-12-22
Payer: MEDICARE

## 2023-12-22 DIAGNOSIS — Z51.81 MEDICATION MONITORING ENCOUNTER: ICD-10-CM

## 2023-12-22 DIAGNOSIS — F20.9 CHRONIC SCHIZOPHRENIA (HCC): ICD-10-CM

## 2023-12-22 LAB — VALPROATE SERPL-MCNC: 98 UG/ML (ref 50–100)

## 2023-12-22 PROCEDURE — 80165 DIPROPYLACETIC ACID FREE: CPT

## 2023-12-22 PROCEDURE — 80164 ASSAY DIPROPYLACETIC ACD TOT: CPT

## 2023-12-22 PROCEDURE — 36415 COLL VENOUS BLD VENIPUNCTURE: CPT

## 2023-12-26 LAB — VALPROATE FREE SERPL-MCNC: 19.7 UG/ML (ref 6–22)

## 2024-02-08 DIAGNOSIS — F20.9 CHRONIC SCHIZOPHRENIA (HCC): ICD-10-CM

## 2024-02-08 RX ORDER — DIVALPROEX SODIUM 250 MG/1
250 TABLET, DELAYED RELEASE ORAL EVERY OTHER DAY
Qty: 45 TABLET | Refills: 0 | Status: SHIPPED | OUTPATIENT
Start: 2024-02-08 | End: 2024-02-12 | Stop reason: CLARIF

## 2024-02-08 RX ORDER — GABAPENTIN 600 MG/1
TABLET ORAL
Qty: 90 TABLET | Refills: 0 | Status: SHIPPED | OUTPATIENT
Start: 2024-02-08

## 2024-02-08 RX ORDER — OLANZAPINE 10 MG/1
10 TABLET ORAL DAILY
Qty: 90 TABLET | Refills: 0 | Status: SHIPPED | OUTPATIENT
Start: 2024-02-08

## 2024-02-08 RX ORDER — OLANZAPINE 20 MG/1
20 TABLET ORAL DAILY
Qty: 90 TABLET | Refills: 0 | Status: SHIPPED | OUTPATIENT
Start: 2024-02-08

## 2024-02-08 RX ORDER — DIVALPROEX SODIUM 500 MG/1
500 TABLET, DELAYED RELEASE ORAL 2 TIMES DAILY
Qty: 180 TABLET | Refills: 0 | Status: SHIPPED | OUTPATIENT
Start: 2024-02-08 | End: 2024-02-12 | Stop reason: CLARIF

## 2024-02-12 ENCOUNTER — TELEPHONE (OUTPATIENT)
Dept: FAMILY MEDICINE CLINIC | Facility: CLINIC | Age: 64
End: 2024-02-12

## 2024-02-12 DIAGNOSIS — F20.9 CHRONIC SCHIZOPHRENIA (HCC): Primary | ICD-10-CM

## 2024-02-12 RX ORDER — DIVALPROEX SODIUM 500 MG/1
500 TABLET, EXTENDED RELEASE ORAL 2 TIMES DAILY
Qty: 180 TABLET | Refills: 0 | Status: SHIPPED | OUTPATIENT
Start: 2024-02-12

## 2024-02-12 RX ORDER — DIVALPROEX SODIUM 250 MG/1
250 TABLET, EXTENDED RELEASE ORAL EVERY OTHER DAY
COMMUNITY
End: 2024-02-12 | Stop reason: SDUPTHER

## 2024-02-12 RX ORDER — DIVALPROEX SODIUM 500 MG/1
500 TABLET, EXTENDED RELEASE ORAL 2 TIMES DAILY
COMMUNITY
End: 2024-02-12 | Stop reason: SDUPTHER

## 2024-02-12 RX ORDER — DIVALPROEX SODIUM 250 MG/1
250 TABLET, EXTENDED RELEASE ORAL EVERY OTHER DAY
Qty: 45 TABLET | Refills: 0 | Status: SHIPPED | OUTPATIENT
Start: 2024-02-12

## 2024-02-12 NOTE — TELEPHONE ENCOUNTER
divalproex sodium (DEPAKOTE) 250 mg DR tablet     divalproex sodium (DEPAKOTE) 500 mg DR tablet   Was called in on 2/8- it is the wrong med- he says he takes the ER not the DR  Is it possible to change this?  Thank you

## 2024-03-13 DIAGNOSIS — F20.9 CHRONIC SCHIZOPHRENIA (HCC): ICD-10-CM

## 2024-03-13 RX ORDER — GABAPENTIN 600 MG/1
TABLET ORAL
Qty: 90 TABLET | Refills: 0 | Status: SHIPPED | OUTPATIENT
Start: 2024-03-13

## 2024-03-20 ENCOUNTER — RA CDI HCC (OUTPATIENT)
Dept: OTHER | Facility: HOSPITAL | Age: 64
End: 2024-03-20

## 2024-03-20 NOTE — PROGRESS NOTES
HCC coding opportunities          Chart Reviewed number of suggestions sent to Provider: 1     Patients Insurance   I50.32  Medicare Insurance: Medicare

## 2024-03-26 ENCOUNTER — OFFICE VISIT (OUTPATIENT)
Dept: FAMILY MEDICINE CLINIC | Facility: CLINIC | Age: 64
End: 2024-03-26
Payer: COMMERCIAL

## 2024-03-26 VITALS
TEMPERATURE: 97.6 F | HEIGHT: 69 IN | RESPIRATION RATE: 14 BRPM | DIASTOLIC BLOOD PRESSURE: 78 MMHG | SYSTOLIC BLOOD PRESSURE: 112 MMHG | BODY MASS INDEX: 32.38 KG/M2 | WEIGHT: 218.6 LBS | OXYGEN SATURATION: 96 % | HEART RATE: 80 BPM

## 2024-03-26 DIAGNOSIS — I10 PRIMARY HYPERTENSION: ICD-10-CM

## 2024-03-26 DIAGNOSIS — Z00.00 MEDICARE ANNUAL WELLNESS VISIT, SUBSEQUENT: Primary | ICD-10-CM

## 2024-03-26 DIAGNOSIS — I48.0 PAROXYSMAL ATRIAL FIBRILLATION (HCC): ICD-10-CM

## 2024-03-26 DIAGNOSIS — F20.9 CHRONIC SCHIZOPHRENIA (HCC): ICD-10-CM

## 2024-03-26 DIAGNOSIS — E78.2 MIXED HYPERLIPIDEMIA: ICD-10-CM

## 2024-03-26 DIAGNOSIS — E03.8 SUBCLINICAL HYPOTHYROIDISM: ICD-10-CM

## 2024-03-26 PROCEDURE — G0439 PPPS, SUBSEQ VISIT: HCPCS | Performed by: FAMILY MEDICINE

## 2024-03-26 NOTE — ASSESSMENT & PLAN NOTE
The patient is stable and will continue to follow-up with his cardiologist as scheduled.  He will continue with his current medications.

## 2024-03-26 NOTE — PATIENT INSTRUCTIONS
Medicare Preventive Visit Patient Instructions  Thank you for completing your Welcome to Medicare Visit or Medicare Annual Wellness Visit today. Your next wellness visit will be due in one year (3/27/2025).  The screening/preventive services that you may require over the next 5-10 years are detailed below. Some tests may not apply to you based off risk factors and/or age. Screening tests ordered at today's visit but not completed yet may show as past due. Also, please note that scanned in results may not display below.  Preventive Screenings:  Service Recommendations Previous Testing/Comments   Colorectal Cancer Screening  Colonoscopy    Fecal Occult Blood Test (FOBT)/Fecal Immunochemical Test (FIT)  Fecal DNA/Cologuard Test  Flexible Sigmoidoscopy Age: 45-75 years old   Colonoscopy: every 10 years (May be performed more frequently if at higher risk)  OR  FOBT/FIT: every 1 year  OR  Cologuard: every 3 years  OR  Sigmoidoscopy: every 5 years  Screening may be recommended earlier than age 45 if at higher risk for colorectal cancer. Also, an individualized decision between you and your healthcare provider will decide whether screening between the ages of 76-85 would be appropriate. Colonoscopy: 06/07/2022  FOBT/FIT: Not on file  Cologuard: Not on file  Sigmoidoscopy: Not on file          Prostate Cancer Screening Individualized decision between patient and health care provider in men between ages of 55-69   Medicare will cover every 12 months beginning on the day after your 50th birthday PSA: 1.68 ng/mL           Hepatitis C Screening Once for adults born between 1945 and 1965  More frequently in patients at high risk for Hepatitis C Hep C Antibody: 02/27/2020        Diabetes Screening 1-2 times per year if you're at risk for diabetes or have pre-diabetes Fasting glucose: 90 mg/dL (12/19/2023)  A1C: No results in last 5 years (No results in last 5 years)      Cholesterol Screening Once every 5 years if you don't have a  lipid disorder. May order more often based on risk factors. Lipid panel: 12/19/2023         Other Preventive Screenings Covered by Medicare:  Abdominal Aortic Aneurysm (AAA) Screening: covered once if your at risk. You're considered to be at risk if you have a family history of AAA or a male between the age of 65-75 who smoking at least 100 cigarettes in your lifetime.  Lung Cancer Screening: covers low dose CT scan once per year if you meet all of the following conditions: (1) Age 55-77; (2) No signs or symptoms of lung cancer; (3) Current smoker or have quit smoking within the last 15 years; (4) You have a tobacco smoking history of at least 20 pack years (packs per day x number of years you smoked); (5) You get a written order from a healthcare provider.  Glaucoma Screening: covered annually if you're considered high risk: (1) You have diabetes OR (2) Family history of glaucoma OR (3)  aged 50 and older OR (4)  American aged 65 and older  Osteoporosis Screening: covered every 2 years if you meet one of the following conditions: (1) Have a vertebral abnormality; (2) On glucocorticoid therapy for more than 3 months; (3) Have primary hyperparathyroidism; (4) On osteoporosis medications and need to assess response to drug therapy.  HIV Screening: covered annually if you're between the age of 15-65. Also covered annually if you are younger than 15 and older than 65 with risk factors for HIV infection. For pregnant patients, it is covered up to 3 times per pregnancy.    Immunizations:  Immunization Recommendations   Influenza Vaccine Annual influenza vaccination during flu season is recommended for all persons aged >= 6 months who do not have contraindications   Pneumococcal Vaccine   * Pneumococcal conjugate vaccine = PCV13 (Prevnar 13), PCV15 (Vaxneuvance), PCV20 (Prevnar 20)  * Pneumococcal polysaccharide vaccine = PPSV23 (Pneumovax) Adults 19-65 yo with certain risk factors or if 65+ yo  If  never received any pneumonia vaccine: recommend Prevnar 20 (PCV20)  Give PCV20 if previously received 1 dose of PCV13 or PPSV23   Hepatitis B Vaccine 3 dose series if at intermediate or high risk (ex: diabetes, end stage renal disease, liver disease)   Respiratory syncytial virus (RSV) Vaccine - COVERED BY MEDICARE PART D  * RSVPreF3 (Arexvy) CDC recommends that adults 60 years of age and older may receive a single dose of RSV vaccine using shared clinical decision-making (SCDM)   Tetanus (Td) Vaccine - COST NOT COVERED BY MEDICARE PART B Following completion of primary series, a booster dose should be given every 10 years to maintain immunity against tetanus. Td may also be given as tetanus wound prophylaxis.   Tdap Vaccine - COST NOT COVERED BY MEDICARE PART B Recommended at least once for all adults. For pregnant patients, recommended with each pregnancy.   Shingles Vaccine (Shingrix) - COST NOT COVERED BY MEDICARE PART B  2 shot series recommended in those 19 years and older who have or will have weakened immune systems or those 50 years and older     Health Maintenance Due:      Topic Date Due   • Colorectal Cancer Screening  06/06/2025   • HIV Screening  Completed   • Hepatitis C Screening  Completed     Immunizations Due:      Topic Date Due   • COVID-19 Vaccine (4 - 2023-24 season) 09/01/2023     Advance Directives   What are advance directives?  Advance directives are legal documents that state your wishes and plans for medical care. These plans are made ahead of time in case you lose your ability to make decisions for yourself. Advance directives can apply to any medical decision, such as the treatments you want, and if you want to donate organs.   What are the types of advance directives?  There are many types of advance directives, and each state has rules about how to use them. You may choose a combination of any of the following:  Living will:  This is a written record of the treatment you want. You  can also choose which treatments you do not want, which to limit, and which to stop at a certain time. This includes surgery, medicine, IV fluid, and tube feedings.   Durable power of  for healthcare (DPAHC):  This is a written record that states who you want to make healthcare choices for you when you are unable to make them for yourself. This person, called a proxy, is usually a family member or a friend. You may choose more than 1 proxy.  Do not resuscitate (DNR) order:  A DNR order is used in case your heart stops beating or you stop breathing. It is a request not to have certain forms of treatment, such as CPR. A DNR order may be included in other types of advance directives.  Medical directive:  This covers the care that you want if you are in a coma, near death, or unable to make decisions for yourself. You can list the treatments you want for each condition. Treatment may include pain medicine, surgery, blood transfusions, dialysis, IV or tube feedings, and a ventilator (breathing machine).  Values history:  This document has questions about your views, beliefs, and how you feel and think about life. This information can help others choose the care that you would choose.  Why are advance directives important?  An advance directive helps you control your care. Although spoken wishes may be used, it is better to have your wishes written down. Spoken wishes can be misunderstood, or not followed. Treatments may be given even if you do not want them. An advance directive may make it easier for your family to make difficult choices about your care.   Weight Management   Why it is important to manage your weight:  Being overweight increases your risk of health conditions such as heart disease, high blood pressure, type 2 diabetes, and certain types of cancer. It can also increase your risk for osteoarthritis, sleep apnea, and other respiratory problems. Aim for a slow, steady weight loss. Even a small  amount of weight loss can lower your risk of health problems.  How to lose weight safely:  A safe and healthy way to lose weight is to eat fewer calories and get regular exercise. You can lose up about 1 pound a week by decreasing the number of calories you eat by 500 calories each day.   Healthy meal plan for weight management:  A healthy meal plan includes a variety of foods, contains fewer calories, and helps you stay healthy. A healthy meal plan includes the following:  Eat whole-grain foods more often.  A healthy meal plan should contain fiber. Fiber is the part of grains, fruits, and vegetables that is not broken down by your body. Whole-grain foods are healthy and provide extra fiber in your diet. Some examples of whole-grain foods are whole-wheat breads and pastas, oatmeal, brown rice, and bulgur.  Eat a variety of vegetables every day.  Include dark, leafy greens such as spinach, kale, yessi greens, and mustard greens. Eat yellow and orange vegetables such as carrots, sweet potatoes, and winter squash.   Eat a variety of fruits every day.  Choose fresh or canned fruit (canned in its own juice or light syrup) instead of juice. Fruit juice has very little or no fiber.  Eat low-fat dairy foods.  Drink fat-free (skim) milk or 1% milk. Eat fat-free yogurt and low-fat cottage cheese. Try low-fat cheeses such as mozzarella and other reduced-fat cheeses.  Choose meat and other protein foods that are low in fat.  Choose beans or other legumes such as split peas or lentils. Choose fish, skinless poultry (chicken or turkey), or lean cuts of red meat (beef or pork). Before you cook meat or poultry, cut off any visible fat.   Use less fat and oil.  Try baking foods instead of frying them. Add less fat, such as margarine, sour cream, regular salad dressing and mayonnaise to foods. Eat fewer high-fat foods. Some examples of high-fat foods include french fries, doughnuts, ice cream, and cakes.  Eat fewer sweets.  Limit  foods and drinks that are high in sugar. This includes candy, cookies, regular soda, and sweetened drinks.  Exercise:  Exercise at least 30 minutes per day on most days of the week. Some examples of exercise include walking, biking, dancing, and swimming. You can also fit in more physical activity by taking the stairs instead of the elevator or parking farther away from stores. Ask your healthcare provider about the best exercise plan for you.      © Copyright [employer] 2018 Information is for End User's use only and may not be sold, redistributed or otherwise used for commercial purposes. All illustrations and images included in CareNotes® are the copyrighted property of [employer] or [employer]

## 2024-03-26 NOTE — PROGRESS NOTES
Assessment and Plan:     Problem List Items Addressed This Visit        Cardiovascular and Mediastinum    Hypertension    Relevant Orders    Comprehensive metabolic panel    Lipid Panel with Direct LDL reflex    T4, free    TSH, 3rd generation    CBC and differential    Paroxysmal atrial fibrillation (HCC)     The patient is stable and will continue to follow-up with his cardiologist as scheduled.  He will continue with his current medications.            Endocrine    Subclinical hypothyroidism    Relevant Orders    T4, free    TSH, 3rd generation       Behavioral Health    Chronic schizophrenia (HCC)     The patient remains stable on his medication.  We have made no changes today.  We will see him back as scheduled.         Relevant Orders    Valproic acid level, free    CBC and differential       Other    Hyperlipidemia    Relevant Orders    Comprehensive metabolic panel    Lipid Panel with Direct LDL reflex   Other Visit Diagnoses     Medicare annual wellness visit, subsequent    -  Primary      The patient had a normal exam today in the office.  He will continue with his current medications.  He will continue to work on his diet and exercise.  We will see him back in the office as scheduled.  He will get his lab work done prior to that visit.      Depression Screening and Follow-up Plan: Patient was screened for depression during today's encounter. They screened negative with a PHQ-2 score of 0.      Preventive health issues were discussed with patient, and age appropriate screening tests were ordered as noted in patient's After Visit Summary.  Personalized health advice and appropriate referrals for health education or preventive services given if needed, as noted in patient's After Visit Summary.  Chief Complaint   Patient presents with   • Medicare Wellness Visit        History of Present Illness:     Patient presents for a Medicare Wellness Visit    Zeke Colunga is a 63 y.o. male who presents today for  subsequent Medicare wellness visit.  He is doing well on his medications. He is trying to walk more and he is eating a healthy diet.  He sees the cardiologist.  The patient denies any chest pain, shortness of breath, or palpitations.  There is no edema.  There are no headaches or visual changes.  There is no lightheadedness, dizziness, or fainting spells.  The patient currently denies any nausea, vomiting, or GERD symptoms.  he has normal bowel movements and normal urine output.  he has a normal appetite.  There are no issues.    He has been stable on his medications for the schizophrenia and has had no breakthrough symptoms.         Patient Care Team:  Cynthia Giraldo DO as PCP - General (Family Medicine)  Kitty Nicholas MD (Cardiology)     Review of Systems:     Review of Systems   Constitutional: Negative.    HENT: Negative.     Eyes: Negative.    Respiratory: Negative.     Cardiovascular: Negative.    Gastrointestinal: Negative.    Endocrine: Negative.    Genitourinary: Negative.    Musculoskeletal: Negative.    Skin: Negative.    Allergic/Immunologic: Negative.    Neurological: Negative.    Hematological: Negative.    Psychiatric/Behavioral: Negative.          Problem List:     Patient Active Problem List   Diagnosis   • Paroxysmal atrial fibrillation (HCC)   • Benign prostatic hyperplasia   • Cataract of both eyes   • Hyperlipidemia   • Hypertension   • Obesity   • Obstructive sleep apnea   • Osteoarthritis   • Extrapyramidal syndrome   • Chronic schizophrenia (HCC)   • Stasis dermatitis   • Venous insufficiency   • Subclinical hypothyroidism   • Dyspnea   • Primary osteoarthritis of right foot   • Osteophyte of right foot      Past Medical and Surgical History:     Past Medical History:   Diagnosis Date   • Atrial fibrillation (HCC)     ablasion   • Hypertension    • Parkinsonism    • Schizophrenia (HCC)      Past Surgical History:   Procedure Laterality Date   • AV NODE ABLATION     • JOINT REPLACEMENT       knee, hip   • TOTAL HIP ARTHROPLASTY Left 08/2006   • TOTAL KNEE ARTHROPLASTY Right 09/17/2011      Family History:     Family History   Problem Relation Age of Onset   • Hypertension Mother    • Stroke Family         syndrome    • Cancer Father    • Colon polyps Sister    • Colon cancer Neg Hx       Social History:     Social History     Socioeconomic History   • Marital status: /Civil Union     Spouse name: None   • Number of children: None   • Years of education: None   • Highest education level: None   Occupational History   • None   Tobacco Use   • Smoking status: Never     Passive exposure: Never   • Smokeless tobacco: Never   Vaping Use   • Vaping status: Never Used   Substance and Sexual Activity   • Alcohol use: No   • Drug use: No   • Sexual activity: None   Other Topics Concern   • None   Social History Narrative    Physical disability      Social Determinants of Health     Financial Resource Strain: Low Risk  (3/26/2024)    Overall Financial Resource Strain (CARDIA)    • Difficulty of Paying Living Expenses: Not hard at all   Food Insecurity: No Food Insecurity (3/26/2024)    Hunger Vital Sign    • Worried About Running Out of Food in the Last Year: Never true    • Ran Out of Food in the Last Year: Never true   Transportation Needs: No Transportation Needs (3/26/2024)    PRAPARE - Transportation    • Lack of Transportation (Medical): No    • Lack of Transportation (Non-Medical): No   Physical Activity: Inactive (4/29/2022)    Exercise Vital Sign    • Days of Exercise per Week: 0 days    • Minutes of Exercise per Session: 0 min   Stress: No Stress Concern Present (4/29/2022)    [employer] [place] [employer] - Occupational Stress Questionnaire    • Feeling of Stress : Not at all   Social Connections: Unknown (2/10/2021)    Social Connection and Isolation Panel [NHANES]    • Frequency of Communication with Friends and Family: Not on file    • Frequency of Social Gatherings with Friends  and Family: Not on file    • Attends Hindu Services: Not on file    • Active Member of Clubs or Organizations: Not on file    • Attends Club or Organization Meetings: Not on file    • Marital Status:    Intimate Partner Violence: Not At Risk (3/26/2024)    Humiliation, Afraid, Rape, and Kick questionnaire    • Fear of Current or Ex-Partner: No    • Emotionally Abused: No    • Physically Abused: No    • Sexually Abused: No   Housing Stability: Low Risk  (3/26/2024)    Housing Stability Vital Sign    • Unable to Pay for Housing in the Last Year: No    • Number of Places Lived in the Last Year: 1    • Unstable Housing in the Last Year: No      Medications and Allergies:     Current Outpatient Medications   Medication Sig Dispense Refill   • aspirin 81 MG tablet Take 1 tablet by mouth daily     • divalproex sodium (DEPAKOTE ER) 250 mg 24 hr tablet Take 1 tablet (250 mg total) by mouth every other day 45 tablet 0   • divalproex sodium (DEPAKOTE ER) 500 mg 24 hr tablet Take 1 tablet (500 mg total) by mouth 2 (two) times a day 180 tablet 0   • furosemide (LASIX) 40 mg tablet Take 40 mg by mouth daily     • gabapentin (NEURONTIN) 600 MG tablet Take 2 tablets QAM and 1 tablet QPM 90 tablet 0   • metoprolol succinate (TOPROL-XL) 25 mg 24 hr tablet 25 mg 2 (two) times a day     • OLANZapine (ZyPREXA) 10 mg tablet Take 1 tablet (10 mg total) by mouth daily 90 tablet 0   • OLANZapine (ZyPREXA) 20 MG tablet Take 1 tablet (20 mg total) by mouth daily Once daily to take with a 10mg tab 90 tablet 0     No current facility-administered medications for this visit.     Allergies   Allergen Reactions   • Haloperidol Hives and Rash     Neuroleptic Malignant Syndrome.        Immunizations:     Immunization History   Administered Date(s) Administered   • COVID-19 MODERNA VACC 0.5 ML IM 01/11/2022   • COVID-19 PFIZER VACCINE 0.3 ML IM 03/28/2021, 04/18/2021   • INFLUENZA 11/02/2013, 11/04/2016, 09/23/2017, 10/13/2018, 08/12/2020,  09/29/2023   • Influenza Quadrivalent 3 years and older 08/12/2020   • Influenza Quadrivalent Preservative Free 3 years and older IM 10/04/2021   • Influenza, injectable, quadrivalent, preservative free 0.5 mL 10/16/2019   • Influenza, recombinant, quadrivalent,injectable, preservative free 10/03/2022   • Influenza, seasonal, injectable 1959, 11/10/2012, 11/04/2016, 09/23/2017   • Pneumococcal Polysaccharide PPV23 11/12/2013   • Td (adult), adsorbed 1959   • Tdap 08/22/2019   • Zoster Vaccine Recombinant 03/01/2022, 07/06/2022      Health Maintenance:         Topic Date Due   • Colorectal Cancer Screening  06/06/2025   • HIV Screening  Completed   • Hepatitis C Screening  Completed         Topic Date Due   • COVID-19 Vaccine (4 - 2023-24 season) 09/01/2023      Medicare Screening Tests and Risk Assessments:     Zeke is here for his Subsequent Wellness visit. Last Medicare Wellness visit information reviewed, patient interviewed and updates made to the record today.      Health Risk Assessment:   Patient rates overall health as good. Patient feels that their physical health rating is same. Patient is satisfied with their life. Eyesight was rated as same. Hearing was rated as same. Patient feels that their emotional and mental health rating is same. Patients states they are never, rarely angry. Patient states they are never, rarely unusually tired/fatigued. Pain experienced in the last 7 days has been none. Patient states that he has experienced no weight loss or gain in last 6 months.     Depression Screening:   PHQ-2 Score: 0      Fall Risk Screening:   In the past year, patient has experienced: no history of falling in past year      Home Safety:  Patient does not have trouble with stairs inside or outside of their home. Patient has working smoke alarms and has working carbon monoxide detector. Home safety hazards include: none.     Nutrition:   Current diet is Regular.     Medications:   Patient is  not currently taking any over-the-counter supplements. Patient is able to manage medications.     Activities of Daily Living (ADLs)/Instrumental Activities of Daily Living (IADLs):   Walk and transfer into and out of bed and chair?: Yes  Dress and groom yourself?: Yes    Bathe or shower yourself?: Yes    Feed yourself? Yes  Do your laundry/housekeeping?: Yes  Manage your money, pay your bills and track your expenses?: Yes  Make your own meals?: Yes    Do your own shopping?: Yes    Previous Hospitalizations:   Any hospitalizations or ED visits within the last 12 months?: No      Advance Care Planning:   Living will: Yes    Durable POA for healthcare: Yes    Advanced directive: Yes    Advanced directive counseling given: Yes    Five wishes given: No    Patient declined ACP directive: No    End of Life Decisions reviewed with patient: Yes    Provider agrees with end of life decisions: Yes      Comments: He took care of this the beginning of this year.      Cognitive Screening:   Provider or family/friend/caregiver concerned regarding cognition?: No    PREVENTIVE SCREENINGS      Cardiovascular Screening:    General: Screening Not Indicated, History Lipid Disorder, Risks and Benefits Discussed and Screening Current      Diabetes Screening:     General: Screening Current and Risks and Benefits Discussed      Colorectal Cancer Screening:     General: Screening Current    Due for: Colonoscopy - Low Risk      Prostate Cancer Screening:    General: Screening Current      Osteoporosis Screening:    General: Screening Not Indicated      Abdominal Aortic Aneurysm (AAA) Screening:        General: Screening Not Indicated      Lung Cancer Screening:     General: Screening Not Indicated      Hepatitis C Screening:    General: Screening Current    Screening, Brief Intervention, and Referral to Treatment (SBIRT)    Screening  Typical number of drinks in a day: 0  Typical number of drinks in a week: 0  Interpretation: Low risk drinking  "behavior.    AUDIT-C Screenin) How often did you have a drink containing alcohol in the past year? never  2) How many drinks did you have on a typical day when you were drinking in the past year? 0  3) How often did you have 6 or more drinks on one occasion in the past year? never    AUDIT-C Score: 0  Interpretation: Score 0-3 (male): Negative screen for alcohol misuse    Single Item Drug Screening:  How often have you used an illegal drug (including marijuana) or a prescription medication for non-medical reasons in the past year? never    Single Item Drug Screen Score: 0  Interpretation: Negative screen for possible drug use disorder    Brief Intervention  Alcohol & drug use screenings were reviewed. No concerns regarding substance use disorder identified.     Other Counseling Topics:   Car/seat belt/driving safety, skin self-exam, sunscreen and calcium and vitamin D intake and regular weightbearing exercise.     Vision Screening    Right eye Left eye Both eyes   Without correction   20/20   With correction      Comments: He saw the eye doctor 6 months ago.  He has reading glasses.       Physical Exam:     /78 (BP Location: Right arm, Patient Position: Sitting, Cuff Size: Large)   Pulse 80   Temp 97.6 °F (36.4 °C)   Resp 14   Ht 5' 9\" (1.753 m)   Wt 99.2 kg (218 lb 9.6 oz)   SpO2 96%   BMI 32.28 kg/m²     Physical Exam  Vitals and nursing note reviewed.   Constitutional:       Appearance: Normal appearance. He is well-developed.   HENT:      Head: Normocephalic and atraumatic.      Right Ear: External ear normal.      Left Ear: External ear normal.      Nose: Nose normal.      Mouth/Throat:      Mouth: Mucous membranes are moist.      Pharynx: Oropharynx is clear. No oropharyngeal exudate.   Eyes:      Extraocular Movements: Extraocular movements intact.      Conjunctiva/sclera: Conjunctivae normal.      Pupils: Pupils are equal, round, and reactive to light.   Neck:      Thyroid: No thyromegaly. "      Trachea: No tracheal deviation.   Cardiovascular:      Rate and Rhythm: Normal rate and regular rhythm.      Pulses: Normal pulses.      Heart sounds: Normal heart sounds. No murmur heard.     No friction rub. No gallop.   Pulmonary:      Effort: Pulmonary effort is normal. No respiratory distress.      Breath sounds: Normal breath sounds. No stridor. No wheezing or rales.   Chest:      Chest wall: No tenderness.   Abdominal:      General: Bowel sounds are normal. There is no distension.      Palpations: Abdomen is soft. There is no mass.      Tenderness: There is no abdominal tenderness. There is no guarding or rebound.      Hernia: No hernia is present.   Genitourinary:     Penis: No tenderness.    Musculoskeletal:         General: No tenderness or deformity. Normal range of motion.      Cervical back: Normal range of motion and neck supple.   Lymphadenopathy:      Cervical: No cervical adenopathy.   Skin:     General: Skin is warm and dry.      Coloration: Skin is not pale.      Findings: No erythema or rash.   Neurological:      Mental Status: He is alert and oriented to person, place, and time.      Cranial Nerves: No cranial nerve deficit.      Sensory: No sensory deficit.      Motor: No abnormal muscle tone.      Coordination: Coordination normal.      Deep Tendon Reflexes: Reflexes normal.   Psychiatric:         Mood and Affect: Mood normal.         Behavior: Behavior normal.         Thought Content: Thought content normal.         Judgment: Judgment normal.          Cynthia Giraldo DO

## 2024-03-26 NOTE — ASSESSMENT & PLAN NOTE
The patient remains stable on his medication.  We have made no changes today.  We will see him back as scheduled.

## 2024-04-15 DIAGNOSIS — F20.9 CHRONIC SCHIZOPHRENIA (HCC): ICD-10-CM

## 2024-04-15 RX ORDER — GABAPENTIN 600 MG/1
TABLET ORAL
Qty: 90 TABLET | Refills: 0 | Status: SHIPPED | OUTPATIENT
Start: 2024-04-15

## 2024-05-13 DIAGNOSIS — F20.9 CHRONIC SCHIZOPHRENIA (HCC): ICD-10-CM

## 2024-05-13 DIAGNOSIS — I10 PRIMARY HYPERTENSION: Primary | ICD-10-CM

## 2024-05-13 NOTE — TELEPHONE ENCOUNTER
Medication: OLANZapine (ZyPREXA) 10 mg tablet     Dose/Frequency:     Take 1 tablet (10 mg total) by mouth daily       Quantity: 90    Pharmacy: [address]     Office:   [x] PCP/Hazel Grialdo   [] Speciality/Provider -     Does the patient have enough for 3 days?   [] Yes   [x] No - Send as HP to POD      Medication: furosemide (LASIX) 40 mg tablet     Dose/Frequency: Take 40 mg by mouth daily     Quantity: 90    Pharmacy: [address]     Office:   [x] PCP/Hazel - Enzo   [] Speciality/Provider -     Does the patient have enough for 3 days?   [] Yes   [x] No - Send as HP to POD    Medication: divalproex sodium (DEPAKOTE ER) 250 mg 24 hr tablet     Dose/Frequency: Take 1 tablet (250 mg total) by mouth every other day     Quantity: 90    Pharmacy: [address]     Office:   [x] PCP/Hazel Giraldo  [] Speciality/Provider -     Does the patient have enough for 3 days?   [] Yes   [x] No - Send as HP to POD      Medication: gabapentin (NEURONTIN) 600 MG tablet     Dose/Frequency: Take 2 tablets QAM and 1 tablet QPM     Quantity: 90    Pharmacy: [address]     Office:   [x] PCP/Hazel Giraldo  [] Speciality/Provider -     Does the patient have enough for 3 days?   [] Yes   [x] No - Send as HP to POD

## 2024-05-15 RX ORDER — OLANZAPINE 10 MG/1
10 TABLET ORAL DAILY
Qty: 90 TABLET | Refills: 1 | Status: SHIPPED | OUTPATIENT
Start: 2024-05-15

## 2024-05-15 RX ORDER — DIVALPROEX SODIUM 250 MG/1
250 TABLET, EXTENDED RELEASE ORAL EVERY OTHER DAY
Qty: 45 TABLET | Refills: 1 | Status: SHIPPED | OUTPATIENT
Start: 2024-05-15

## 2024-05-15 RX ORDER — GABAPENTIN 600 MG/1
TABLET ORAL
Qty: 90 TABLET | Refills: 5 | Status: SHIPPED | OUTPATIENT
Start: 2024-05-15

## 2024-05-15 RX ORDER — FUROSEMIDE 40 MG/1
40 TABLET ORAL DAILY
Qty: 90 TABLET | Refills: 1 | Status: SHIPPED | OUTPATIENT
Start: 2024-05-15

## 2024-06-13 ENCOUNTER — TELEPHONE (OUTPATIENT)
Age: 64
End: 2024-06-13

## 2024-06-13 NOTE — TELEPHONE ENCOUNTER
Patient called office to schedule TCM appointment with Dr. Giraldo from   6/7-6/10.  Tried to call office to schedule TCM.  I told patient we will reach back out to him to follow up from hospital stay.    Thank you

## 2024-06-19 ENCOUNTER — TELEPHONE (OUTPATIENT)
Dept: ADMINISTRATIVE | Facility: OTHER | Age: 64
End: 2024-06-19

## 2024-06-19 DIAGNOSIS — F20.9 CHRONIC SCHIZOPHRENIA (HCC): ICD-10-CM

## 2024-06-19 RX ORDER — OLANZAPINE 20 MG/1
20 TABLET ORAL DAILY
Qty: 90 TABLET | Refills: 0 | Status: SHIPPED | OUTPATIENT
Start: 2024-06-19

## 2024-06-19 RX ORDER — DIVALPROEX SODIUM 500 MG/1
500 TABLET, EXTENDED RELEASE ORAL 2 TIMES DAILY
Qty: 60 TABLET | Refills: 0 | Status: SHIPPED | OUTPATIENT
Start: 2024-06-19

## 2024-06-19 NOTE — TELEPHONE ENCOUNTER
06/19/24 2:12 PM    Patient contacted to bring Advance Directive, POLST, or Living Will document to next scheduled pcp visit.[facility] Department left message.    Thank you.  Shaka Guy  [facility]

## 2024-06-19 NOTE — TELEPHONE ENCOUNTER
Medication: Depakote ER    Dose/Frequency: 500mg 1 tab twice day    Quantity: 180    Pharmacy: [facility] [address]    Office:   [x] PCP/Provider -   [] Speciality/Provider -     Does the patient have enough for 3 days?   [x] Yes   [] No - Send as HP to POD     Medication: Zxpreza     Dose/Frequency: 20mg 1 tab daily    Quantity: 90    Pharmacy: [facility] [address] [place]    Office:   [x] PCP/Provider -   [] Speciality/Provider -     Does the patient have enough for 3 days?   [x] Yes   [] No - Send as HP to POD     Patient would like 90 day supply of each with 1 refill if possible

## 2024-06-20 ENCOUNTER — OFFICE VISIT (OUTPATIENT)
Dept: FAMILY MEDICINE CLINIC | Facility: CLINIC | Age: 64
End: 2024-06-20
Payer: COMMERCIAL

## 2024-06-20 VITALS
HEIGHT: 69 IN | BODY MASS INDEX: 31.55 KG/M2 | DIASTOLIC BLOOD PRESSURE: 62 MMHG | SYSTOLIC BLOOD PRESSURE: 110 MMHG | HEART RATE: 80 BPM | OXYGEN SATURATION: 96 % | WEIGHT: 213 LBS | RESPIRATION RATE: 17 BRPM | TEMPERATURE: 98.7 F

## 2024-06-20 DIAGNOSIS — I50.89 OTHER HEART FAILURE (HCC): ICD-10-CM

## 2024-06-20 DIAGNOSIS — R74.8 ELEVATED CREATINE KINASE LEVEL: ICD-10-CM

## 2024-06-20 DIAGNOSIS — I48.0 PAROXYSMAL ATRIAL FIBRILLATION (HCC): ICD-10-CM

## 2024-06-20 DIAGNOSIS — U07.1 COVID-19: ICD-10-CM

## 2024-06-20 DIAGNOSIS — I10 PRIMARY HYPERTENSION: ICD-10-CM

## 2024-06-20 DIAGNOSIS — F20.9 CHRONIC SCHIZOPHRENIA (HCC): ICD-10-CM

## 2024-06-20 DIAGNOSIS — R29.90 STROKE-LIKE SYMPTOM: ICD-10-CM

## 2024-06-20 DIAGNOSIS — Z09 ENCOUNTER FOR FOLLOW-UP: Primary | ICD-10-CM

## 2024-06-20 PROCEDURE — G2211 COMPLEX E/M VISIT ADD ON: HCPCS

## 2024-06-20 PROCEDURE — 99214 OFFICE O/P EST MOD 30 MIN: CPT

## 2024-06-20 RX ORDER — CLOPIDOGREL BISULFATE 75 MG/1
75 TABLET ORAL DAILY
COMMUNITY
Start: 2024-06-11 | End: 2025-06-11

## 2024-06-20 RX ORDER — ATORVASTATIN CALCIUM 40 MG/1
40 TABLET, FILM COATED ORAL DAILY
COMMUNITY
Start: 2024-06-10 | End: 2025-06-10

## 2024-06-20 NOTE — ASSESSMENT & PLAN NOTE
Previous labs with downward trend. Labs ordered today to determine continuation of resolution. The patient will be notified of results when available.  Last value:  Component  Ref Range & Units 6/10/24  3:31 AM   CK, Total  30 - 223 U/L 3,741 High

## 2024-06-20 NOTE — ASSESSMENT & PLAN NOTE
The patient has participated in a CTE study for the past 15 years. A referral to neurology was placed today. The patient will obtain the lab work ordered today prior to the next scheduled appointment. The patient will be notified of the results when available.    6/8/2024 MRI brain wo contrast  IMPRESSION: No acute hemorrhage, mass or ischemia identified.    6/7/2024 CT head wo contrast:  IMPRESSION:  1. There is left hypodense MCA sign . There are bilateral basal ganglia low-attenuation lesions measuring 0.4 x 0.4 cm on the right and 0.8 x 0.4 cm on the left representing age-indeterminate infarcts. There are no findings of intra-or extra-axial hemorrhage, midline shift or herniation.

## 2024-06-20 NOTE — PROGRESS NOTES
Ambulatory Visit  Name: Zeke Colunga      : 1960      MRN: [id number]  Encounter Provider: ANNA Marie  Encounter Date: 2024   Encounter department: [facility]    Assessment & Plan   1. Encounter for follow-up  2. Stroke-like symptom  Assessment & Plan:  The patient has participated in a CTE study for the past 15 years. A referral to neurology was placed today. The patient will obtain the lab work ordered today prior to the next scheduled appointment. The patient will be notified of the results when available.    2024 MRI brain wo contrast  IMPRESSION: No acute hemorrhage, mass or ischemia identified.    2024 CT head wo contrast:  IMPRESSION:  1. There is left hypodense MCA sign . There are bilateral basal ganglia low-attenuation lesions measuring 0.4 x 0.4 cm on the right and 0.8 x 0.4 cm on the left representing age-indeterminate infarcts. There are no findings of intra-or extra-axial hemorrhage, midline shift or herniation.   Orders:  -     Ambulatory Referral to Neurology; Future  -     CBC and differential; Future  -     Basic metabolic panel; Future  -     CK; Future  -     D-dimer, quantitative; Future  3. COVID-19  4. Elevated creatine kinase level  Assessment & Plan:  Previous labs with downward trend. Labs ordered today to determine continuation of resolution. The patient will be notified of results when available.  Last value:  Component  Ref Range & Units 6/10/24  3:31 AM   CK, Total  30 - 223 U/L 3,741 High      Orders:  -     CK; Future  5. Other heart failure (HCC)  Assessment & Plan:  Stable.  6. Paroxysmal atrial fibrillation (HCC)  Assessment & Plan:  The patient has a history of cardiac ablation. Episodes of a-fib noted during his recent hospital stay. The patient is asymptomatic. The patient follows with a cardiologist and has an upcoming appointment on 7/10/24. Continue current dual therapy with ASA 81 mg and clopidogrel 75 mg  until 7/1 followed by monotherapy with clopidogrel 75 mg.  7. Primary hypertension  Assessment & Plan:  The patient's blood pressure stable on his current medication. We have made no changes today. We will see him back in the office as scheduled.   8. Chronic schizophrenia (HCC)  Assessment & Plan:  The patient remains stable on his medication. We have made no changes today. We will see him back as scheduled.        History of Present Illness     Harvey Colunga is a 63 year old male who presents today for a follow up from an emergency department visit. He is accompanied by his wife today. He reports he is feeling much better and is getting his strength back. He has retested for Covid and was negative. He reports occasional SOB with climbing stairs. They report he has been having a more difficult time lifting his left foot when going up stairs and has occasionally tripped/stumbled. They report no falls. He reports he has an upcoming appointment with his cardiologist on July 10th. He denies current stroke symptoms.        Review of Systems   Constitutional: Negative.  Negative for chills, fatigue and fever.   HENT: Negative.  Negative for congestion, ear pain, rhinorrhea and sore throat.    Eyes: Negative.  Negative for pain and visual disturbance.   Respiratory: Negative.  Negative for cough and shortness of breath.    Cardiovascular: Negative.  Negative for chest pain, palpitations and leg swelling.   Gastrointestinal:  Negative for abdominal pain, constipation, diarrhea, nausea and vomiting.   Endocrine: Negative.    Genitourinary: Negative.  Negative for dysuria, frequency and urgency.   Musculoskeletal:  Positive for gait problem. Negative for back pain and myalgias.   Skin: Negative.  Negative for rash.   Allergic/Immunologic: Negative.    Neurological:  Negative for dizziness, tremors, seizures, syncope, facial asymmetry, speech difficulty, weakness, light-headedness, numbness and headaches.   Hematological:  "Negative.    Psychiatric/Behavioral: Negative.         Objective     /62 (BP Location: Left arm, Patient Position: Sitting, Cuff Size: Large)   Pulse 80   Temp 98.7 °F (37.1 °C) (Tympanic)   Resp 17   Ht 5' 9\" (1.753 m)   Wt 96.6 kg (213 lb)   SpO2 96%   BMI 31.45 kg/m²     Physical Exam  Vitals and nursing note reviewed.   Constitutional:       General: He is not in acute distress.     Appearance: Normal appearance. He is not ill-appearing.   HENT:      Head: Normocephalic and atraumatic.   Eyes:      Extraocular Movements: Extraocular movements intact.      Conjunctiva/sclera: Conjunctivae normal.      Pupils: Pupils are equal, round, and reactive to light.   Cardiovascular:      Rate and Rhythm: Normal rate and regular rhythm.      Heart sounds: Normal heart sounds. No murmur heard.  Pulmonary:      Effort: Pulmonary effort is normal. No respiratory distress.      Breath sounds: Normal breath sounds. No wheezing.   Musculoskeletal:         General: Normal range of motion.      Cervical back: Normal range of motion.      Right lower leg: No edema.      Left lower leg: No edema.   Skin:     General: Skin is warm and dry.      Capillary Refill: Capillary refill takes less than 2 seconds.   Neurological:      General: No focal deficit present.      Mental Status: He is alert and oriented to person, place, and time. Mental status is at baseline.      Sensory: No sensory deficit.      Motor: Weakness present. No tremor, atrophy or abnormal muscle tone.      Gait: Gait is intact.      Comments: Strength decreased on left lower extremity with flexion of the hip joint with resistance on knee   Psychiatric:         Mood and Affect: Mood normal.         Behavior: Behavior normal.       Administrative Statements     "

## 2024-06-20 NOTE — ASSESSMENT & PLAN NOTE
The patient has a history of cardiac ablation. Episodes of a-fib noted during his recent hospital stay. The patient is asymptomatic. The patient follows with a cardiologist and has an upcoming appointment on 7/10/24. Continue current dual therapy with ASA 81 mg and clopidogrel 75 mg until 7/1 followed by monotherapy with clopidogrel 75 mg.

## 2024-06-21 ENCOUNTER — APPOINTMENT (OUTPATIENT)
Dept: LAB | Facility: CLINIC | Age: 64
End: 2024-06-21
Payer: COMMERCIAL

## 2024-06-21 DIAGNOSIS — R29.90 STROKE-LIKE SYMPTOM: ICD-10-CM

## 2024-06-21 DIAGNOSIS — R74.8 ELEVATED CREATINE KINASE LEVEL: ICD-10-CM

## 2024-06-21 LAB
ANION GAP SERPL CALCULATED.3IONS-SCNC: 12 MMOL/L (ref 4–13)
BASOPHILS # BLD AUTO: 0.04 THOUSANDS/ÂΜL (ref 0–0.1)
BASOPHILS NFR BLD AUTO: 1 % (ref 0–1)
BUN SERPL-MCNC: 11 MG/DL (ref 5–25)
CALCIUM SERPL-MCNC: 9 MG/DL (ref 8.4–10.2)
CHLORIDE SERPL-SCNC: 104 MMOL/L (ref 96–108)
CK SERPL-CCNC: 440 U/L (ref 39–308)
CO2 SERPL-SCNC: 26 MMOL/L (ref 21–32)
CREAT SERPL-MCNC: 1.19 MG/DL (ref 0.6–1.3)
D DIMER PPP FEU-MCNC: 0.49 UG/ML FEU
EOSINOPHIL # BLD AUTO: 0.11 THOUSAND/ÂΜL (ref 0–0.61)
EOSINOPHIL NFR BLD AUTO: 1 % (ref 0–6)
ERYTHROCYTE [DISTWIDTH] IN BLOOD BY AUTOMATED COUNT: 12.8 % (ref 11.6–15.1)
GFR SERPL CREATININE-BSD FRML MDRD: 64 ML/MIN/1.73SQ M
GLUCOSE P FAST SERPL-MCNC: 94 MG/DL (ref 65–99)
HCT VFR BLD AUTO: 40.1 % (ref 36.5–49.3)
HGB BLD-MCNC: 13.2 G/DL (ref 12–17)
IMM GRANULOCYTES # BLD AUTO: 0.05 THOUSAND/UL (ref 0–0.2)
IMM GRANULOCYTES NFR BLD AUTO: 1 % (ref 0–2)
LYMPHOCYTES # BLD AUTO: 1.66 THOUSANDS/ÂΜL (ref 0.6–4.47)
LYMPHOCYTES NFR BLD AUTO: 21 % (ref 14–44)
MCH RBC QN AUTO: 29.5 PG (ref 26.8–34.3)
MCHC RBC AUTO-ENTMCNC: 32.9 G/DL (ref 31.4–37.4)
MCV RBC AUTO: 90 FL (ref 82–98)
MONOCYTES # BLD AUTO: 0.86 THOUSAND/ÂΜL (ref 0.17–1.22)
MONOCYTES NFR BLD AUTO: 11 % (ref 4–12)
NEUTROPHILS # BLD AUTO: 5.13 THOUSANDS/ÂΜL (ref 1.85–7.62)
NEUTS SEG NFR BLD AUTO: 65 % (ref 43–75)
NRBC BLD AUTO-RTO: 0 /100 WBCS
PLATELET # BLD AUTO: 278 THOUSANDS/UL (ref 149–390)
PMV BLD AUTO: 10.3 FL (ref 8.9–12.7)
POTASSIUM SERPL-SCNC: 4.1 MMOL/L (ref 3.5–5.3)
RBC # BLD AUTO: 4.47 MILLION/UL (ref 3.88–5.62)
SODIUM SERPL-SCNC: 142 MMOL/L (ref 135–147)
WBC # BLD AUTO: 7.85 THOUSAND/UL (ref 4.31–10.16)

## 2024-06-21 PROCEDURE — 85379 FIBRIN DEGRADATION QUANT: CPT

## 2024-06-21 PROCEDURE — 36415 COLL VENOUS BLD VENIPUNCTURE: CPT

## 2024-06-21 PROCEDURE — 82550 ASSAY OF CK (CPK): CPT

## 2024-06-21 PROCEDURE — 80048 BASIC METABOLIC PNL TOTAL CA: CPT

## 2024-06-21 PROCEDURE — 85025 COMPLETE CBC W/AUTO DIFF WBC: CPT

## 2024-06-27 ENCOUNTER — TELEPHONE (OUTPATIENT)
Dept: FAMILY MEDICINE CLINIC | Facility: CLINIC | Age: 64
End: 2024-06-27

## 2024-06-27 NOTE — TELEPHONE ENCOUNTER
----- Message from ANNA Dobbins sent at 6/26/2024  9:14 PM EDT -----  Please let Harvey know that his labs are almost all back to normal. His CK is down from 3,741 to 440. His D-dimer, which looks for blood clots, is also back to normal. At this point I do not think we need to recheck the labs unless you are having any more symptoms. We'll see you at your next scheduled appointment. Take care.

## 2024-07-25 ENCOUNTER — TELEPHONE (OUTPATIENT)
Age: 64
End: 2024-07-25

## 2024-07-25 NOTE — TELEPHONE ENCOUNTER
PT CALLED IN STATING SERVICES WILL NO LONGER BE NEEDED BY [facility]. PT IS EST. WITH ANOTHER  NEUROLOGY GROUP.     EXPLAINED TO PT HE IS WELCOMED ANYTIME TO CALL [facility].

## 2024-07-29 DIAGNOSIS — F20.9 CHRONIC SCHIZOPHRENIA (HCC): ICD-10-CM

## 2024-07-29 RX ORDER — DIVALPROEX SODIUM 500 MG/1
500 TABLET, EXTENDED RELEASE ORAL 2 TIMES DAILY
Qty: 200 TABLET | Refills: 1 | Status: SHIPPED | OUTPATIENT
Start: 2024-07-29

## 2024-07-29 NOTE — TELEPHONE ENCOUNTER
Pt requesting 90 day supply.    Reason for call:   [x] Refill   [] Prior Auth  [] Other:     Office:   [x] PCP/Provider - [facility]   [] Specialty/Provider -     Medication:   divalproex sodium (DEPAKOTE ER) 500 mg 24 hr tablet - Take 1 tablet (500 mg total) by mouth 2 (two) times a day     Pharmacy:   [place] [id number] [address]     Does the patient have enough for 3 days?   [] Yes   [x] No - Send as HP to POD

## 2024-07-31 ENCOUNTER — HOSPITAL ENCOUNTER (OUTPATIENT)
Dept: HOSPITAL 99 - RCS | Age: 64
End: 2024-07-31
Payer: COMMERCIAL

## 2024-07-31 DIAGNOSIS — R06.02: Primary | ICD-10-CM

## 2024-07-31 PROCEDURE — A9500 TC99M SESTAMIBI: HCPCS

## 2024-07-31 RX ADMIN — Medication 1 FLUSH: at 08:23

## 2024-07-31 RX ADMIN — REGADENOSON 0.4 MG: 0.08 INJECTION, SOLUTION INTRAVENOUS at 08:22

## 2024-09-16 ENCOUNTER — APPOINTMENT (OUTPATIENT)
Dept: LAB | Facility: CLINIC | Age: 64
End: 2024-09-16
Payer: COMMERCIAL

## 2024-09-16 DIAGNOSIS — I10 PRIMARY HYPERTENSION: ICD-10-CM

## 2024-09-16 DIAGNOSIS — E78.2 MIXED HYPERLIPIDEMIA: ICD-10-CM

## 2024-09-16 DIAGNOSIS — F20.9 CHRONIC SCHIZOPHRENIA (HCC): ICD-10-CM

## 2024-09-16 DIAGNOSIS — E03.8 SUBCLINICAL HYPOTHYROIDISM: ICD-10-CM

## 2024-09-16 LAB
ALBUMIN SERPL BCG-MCNC: 4.5 G/DL (ref 3.5–5)
ALP SERPL-CCNC: 56 U/L (ref 34–104)
ALT SERPL W P-5'-P-CCNC: 20 U/L (ref 7–52)
ANION GAP SERPL CALCULATED.3IONS-SCNC: 13 MMOL/L (ref 4–13)
AST SERPL W P-5'-P-CCNC: 23 U/L (ref 13–39)
BASOPHILS # BLD AUTO: 0.06 THOUSANDS/ΜL (ref 0–0.1)
BASOPHILS NFR BLD AUTO: 1 % (ref 0–1)
BILIRUB DIRECT SERPL-MCNC: 0.22 MG/DL (ref 0–0.2)
BILIRUB SERPL-MCNC: 0.81 MG/DL (ref 0.2–1)
BUN SERPL-MCNC: 17 MG/DL (ref 5–25)
CALCIUM SERPL-MCNC: 9.1 MG/DL (ref 8.4–10.2)
CHLORIDE SERPL-SCNC: 102 MMOL/L (ref 96–108)
CHOLEST SERPL-MCNC: 102 MG/DL
CO2 SERPL-SCNC: 26 MMOL/L (ref 21–32)
CREAT SERPL-MCNC: 1.1 MG/DL (ref 0.6–1.3)
EOSINOPHIL # BLD AUTO: 0.14 THOUSAND/ΜL (ref 0–0.61)
EOSINOPHIL NFR BLD AUTO: 2 % (ref 0–6)
ERYTHROCYTE [DISTWIDTH] IN BLOOD BY AUTOMATED COUNT: 12.8 % (ref 11.6–15.1)
GFR SERPL CREATININE-BSD FRML MDRD: 70 ML/MIN/1.73SQ M
GLUCOSE SERPL-MCNC: 95 MG/DL (ref 65–140)
HCT VFR BLD AUTO: 43 % (ref 36.5–49.3)
HDLC SERPL-MCNC: 38 MG/DL
HGB BLD-MCNC: 14 G/DL (ref 12–17)
IMM GRANULOCYTES # BLD AUTO: 0.03 THOUSAND/UL (ref 0–0.2)
IMM GRANULOCYTES NFR BLD AUTO: 0 % (ref 0–2)
LDLC SERPL CALC-MCNC: 34 MG/DL (ref 0–100)
LYMPHOCYTES # BLD AUTO: 1.7 THOUSANDS/ΜL (ref 0.6–4.47)
LYMPHOCYTES NFR BLD AUTO: 25 % (ref 14–44)
MCH RBC QN AUTO: 29 PG (ref 26.8–34.3)
MCHC RBC AUTO-ENTMCNC: 32.6 G/DL (ref 31.4–37.4)
MCV RBC AUTO: 89 FL (ref 82–98)
MONOCYTES # BLD AUTO: 0.69 THOUSAND/ΜL (ref 0.17–1.22)
MONOCYTES NFR BLD AUTO: 10 % (ref 4–12)
NEUTROPHILS # BLD AUTO: 4.14 THOUSANDS/ΜL (ref 1.85–7.62)
NEUTS SEG NFR BLD AUTO: 62 % (ref 43–75)
NRBC BLD AUTO-RTO: 0 /100 WBCS
PLATELET # BLD AUTO: 228 THOUSANDS/UL (ref 149–390)
PMV BLD AUTO: 9.7 FL (ref 8.9–12.7)
POTASSIUM SERPL-SCNC: 4.1 MMOL/L (ref 3.5–5.3)
PROT SERPL-MCNC: 7.3 G/DL (ref 6.4–8.4)
RBC # BLD AUTO: 4.82 MILLION/UL (ref 3.88–5.62)
SODIUM SERPL-SCNC: 141 MMOL/L (ref 135–147)
T4 FREE SERPL-MCNC: 0.77 NG/DL (ref 0.61–1.12)
TRIGL SERPL-MCNC: 151 MG/DL
TSH SERPL DL<=0.05 MIU/L-ACNC: 5.66 UIU/ML (ref 0.45–4.5)
WBC # BLD AUTO: 6.76 THOUSAND/UL (ref 4.31–10.16)

## 2024-09-16 PROCEDURE — 85025 COMPLETE CBC W/AUTO DIFF WBC: CPT

## 2024-09-16 PROCEDURE — 80053 COMPREHEN METABOLIC PANEL: CPT

## 2024-09-16 PROCEDURE — 80061 LIPID PANEL: CPT

## 2024-09-16 PROCEDURE — 80165 DIPROPYLACETIC ACID FREE: CPT

## 2024-09-16 PROCEDURE — 82248 BILIRUBIN DIRECT: CPT

## 2024-09-16 PROCEDURE — 36415 COLL VENOUS BLD VENIPUNCTURE: CPT

## 2024-09-16 PROCEDURE — 84443 ASSAY THYROID STIM HORMONE: CPT

## 2024-09-16 PROCEDURE — 84439 ASSAY OF FREE THYROXINE: CPT

## 2024-09-17 DIAGNOSIS — F20.9 CHRONIC SCHIZOPHRENIA (HCC): ICD-10-CM

## 2024-09-17 RX ORDER — OLANZAPINE 20 MG/1
20 TABLET ORAL DAILY
Qty: 90 TABLET | Refills: 1 | Status: SHIPPED | OUTPATIENT
Start: 2024-09-17

## 2024-09-17 NOTE — TELEPHONE ENCOUNTER
Reason for call:   [x] Refill   [] Prior Auth  [] Other:     Office:   [x] PCP/Provider -   Cynthia Giraldo, DO  PCP - General  [] Specialty/Provider -     Medication:     OLANZapine (ZyPREXA) 20 MG tablet 20 mg, Daily # 90         Pharmacy: [place] [id number] - [place], [place] - [facility] [address]      Does the patient have enough for 3 days?   [] Yes   [x] No - Send as HP to POD

## 2024-09-19 LAB — VALPROATE FREE SERPL-MCNC: 17.1 UG/ML (ref 6–22)

## 2024-10-04 ENCOUNTER — RA CDI HCC (OUTPATIENT)
Dept: OTHER | Facility: HOSPITAL | Age: 64
End: 2024-10-04

## 2024-10-09 ENCOUNTER — TELEPHONE (OUTPATIENT)
Dept: ADMINISTRATIVE | Facility: OTHER | Age: 64
End: 2024-10-09

## 2024-10-09 NOTE — TELEPHONE ENCOUNTER
10/09/24 9:25 AM    Patient contacted to bring Advance Directive, POLST, or Living Will document to next scheduled pcp visit.[facility] Department left message.    Thank you.  Ange Baca MA  [facility]

## 2024-10-10 ENCOUNTER — OFFICE VISIT (OUTPATIENT)
Dept: FAMILY MEDICINE CLINIC | Facility: CLINIC | Age: 64
End: 2024-10-10
Payer: COMMERCIAL

## 2024-10-10 VITALS
OXYGEN SATURATION: 98 % | RESPIRATION RATE: 17 BRPM | HEART RATE: 68 BPM | WEIGHT: 218.8 LBS | DIASTOLIC BLOOD PRESSURE: 80 MMHG | SYSTOLIC BLOOD PRESSURE: 122 MMHG | HEIGHT: 69 IN | BODY MASS INDEX: 32.41 KG/M2 | TEMPERATURE: 97.6 F

## 2024-10-10 DIAGNOSIS — Z23 ENCOUNTER FOR IMMUNIZATION: ICD-10-CM

## 2024-10-10 DIAGNOSIS — Z23 NEED FOR INFLUENZA VACCINATION: ICD-10-CM

## 2024-10-10 DIAGNOSIS — G45.9 TIA (TRANSIENT ISCHEMIC ATTACK): ICD-10-CM

## 2024-10-10 DIAGNOSIS — I48.0 PAROXYSMAL ATRIAL FIBRILLATION (HCC): ICD-10-CM

## 2024-10-10 DIAGNOSIS — F20.9 CHRONIC SCHIZOPHRENIA (HCC): ICD-10-CM

## 2024-10-10 DIAGNOSIS — E78.2 MIXED HYPERLIPIDEMIA: ICD-10-CM

## 2024-10-10 DIAGNOSIS — E03.8 SUBCLINICAL HYPOTHYROIDISM: ICD-10-CM

## 2024-10-10 DIAGNOSIS — I10 PRIMARY HYPERTENSION: Primary | ICD-10-CM

## 2024-10-10 PROCEDURE — 90677 PCV20 VACCINE IM: CPT

## 2024-10-10 PROCEDURE — 90662 IIV NO PRSV INCREASED AG IM: CPT

## 2024-10-10 PROCEDURE — 99214 OFFICE O/P EST MOD 30 MIN: CPT | Performed by: FAMILY MEDICINE

## 2024-10-10 PROCEDURE — G0008 ADMIN INFLUENZA VIRUS VAC: HCPCS

## 2024-10-10 PROCEDURE — G0009 ADMIN PNEUMOCOCCAL VACCINE: HCPCS

## 2024-10-10 NOTE — ASSESSMENT & PLAN NOTE
The patient's lipids are well-controlled on the current dose of his atorvastatin.  He will maintain on this for stroke protection as well.

## 2024-10-10 NOTE — ASSESSMENT & PLAN NOTE
The patient has had no further episodes.  She will follow-up with cardiology as scheduled for the loop recorder placement.

## 2024-10-10 NOTE — PROGRESS NOTES
Ambulatory Visit  Name: Zeke Colunga      : 1960      MRN: [id number]  Encounter Provider: Cynthia Giraldo DO  Encounter Date: 10/10/2024   Encounter department: [facility]    Assessment & Plan  Primary hypertension  The patient's blood pressure is stable on his current medication.  We have made no changes today.  The patient will continue with his healthy diet and exercise.  We will see him back as scheduled.       Paroxysmal atrial fibrillation (HCC)  The patient will follow-up with cardiology as scheduled for his loop recorder placement.       TIA (transient ischemic attack)  The patient has had no further episodes.  She will follow-up with cardiology as scheduled for the loop recorder placement.       Subclinical hypothyroidism  The patient still has a mildly elevated TSH but normal T4 free consistent with subclinical hypothyroidism.  We will continue to monitor.  He does not require treatment at this time.  We will see him back as scheduled.       Chronic schizophrenia (HCC)  The patient continues to remain stable on his medication.  We have made no changes today.       Mixed hyperlipidemia  The patient's lipids are well-controlled on the current dose of his atorvastatin.  He will maintain on this for stroke protection as well.       Need for influenza vaccination    Orders:    influenza vaccine, high-dose, PF 0.5 mL (Fluzone High Dose)    Encounter for immunization    Orders:    Pneumococcal Conjugate Vaccine 20-valent (Pcv20)       Chief Complaint   Patient presents with    Follow-up     6 month follow up. Saw [facility] neurology. Will have Loop recorder placed through [facility], recommended by neurology and cardiology.        History of Present Illness     Zeke Colunga is a 64 y.o. male who presents today for follow-up of his hypertension, paroxysmal atrial fibrillation, subclinical hypothyroidism, schizophrenia, and hyperlipidemia.  The patient denies any  chest pain, shortness of breath, or palpitations.  There is no ALLEN, PND, or orthopnea.  There is no edema.  There are no headaches or visual changes.  There is no lightheadedness, dizziness, or fainting spells.  The patient currently denies any nausea, vomiting, or GERD symptoms.  he has normal bowel movements and normal urine output.  he has a normal appetite.  He is feeling short of breath at times.    He had a TIA in June 2024.  He is following with neurology for this.  He has had no further episodes since then.  He is currently taking his Plavix and the atorvastatin.  He saw Cardiology recently- he is having a Loop Recorder placed on 10/23/2024.      Hypertension  This is a chronic problem. The current episode started more than 1 year ago. The problem is unchanged. The problem is controlled. Pertinent negatives include no anxiety, blurred vision, chest pain, headaches, malaise/fatigue, neck pain, orthopnea, palpitations, peripheral edema, PND, shortness of breath or sweats.       History obtained from : patient  Review of Systems   Constitutional: Negative.  Negative for malaise/fatigue.   HENT: Negative.     Eyes: Negative.  Negative for blurred vision.   Respiratory: Negative.  Negative for shortness of breath.    Cardiovascular: Negative.  Negative for chest pain, palpitations, orthopnea and PND.   Gastrointestinal: Negative.    Endocrine: Negative.    Genitourinary: Negative.    Musculoskeletal: Negative.  Negative for neck pain.   Skin: Negative.    Allergic/Immunologic: Negative.    Neurological: Negative.  Negative for headaches.   Hematological: Negative.    Psychiatric/Behavioral: Negative.       Medical History Reviewed by provider this encounter:  Allergies  Meds       Current Outpatient Medications on File Prior to Visit   Medication Sig Dispense Refill    atorvastatin (LIPITOR) 40 mg tablet Take 40 mg by mouth daily      clopidogrel (PLAVIX) 75 mg tablet Take 75 mg by mouth daily      divalproex  "sodium (DEPAKOTE ER) 250 mg 24 hr tablet Take 1 tablet (250 mg total) by mouth every other day 45 tablet 1    divalproex sodium (DEPAKOTE ER) 500 mg 24 hr tablet Take 1 tablet (500 mg total) by mouth 2 (two) times a day 200 tablet 1    furosemide (LASIX) 40 mg tablet Take 1 tablet (40 mg total) by mouth daily 90 tablet 1    gabapentin (NEURONTIN) 600 MG tablet Take 2 tablets QAM and 1 tablet QPM 90 tablet 5    metoprolol succinate (TOPROL-XL) 25 mg 24 hr tablet 25 mg 2 (two) times a day      OLANZapine (ZyPREXA) 10 mg tablet Take 1 tablet (10 mg total) by mouth daily 90 tablet 1    [DISCONTINUED] aspirin 81 MG tablet Take 1 tablet by mouth daily      OLANZapine (ZyPREXA) 20 MG tablet Take 1 tablet (20 mg total) by mouth daily Once daily to take with a 10mg tab 90 tablet 1    [DISCONTINUED] polyethylene glycol (GOLYTELY) 4000 mL solution Take 4,000 mL by mouth once for 1 dose 4000 mL 0     No current facility-administered medications on file prior to visit.      Social History     Tobacco Use    Smoking status: Never     Passive exposure: Never    Smokeless tobacco: Never   Vaping Use    Vaping status: Never Used   Substance and Sexual Activity    Alcohol use: No    Drug use: No    Sexual activity: Not on file         Objective     /80   Pulse 68   Temp 97.6 °F (36.4 °C) (Tympanic)   Resp 17   Ht 5' 9\" (1.753 m)   Wt 99.2 kg (218 lb 12.8 oz)   SpO2 98%   BMI 32.31 kg/m²     Physical Exam  Vitals and nursing note reviewed.   Constitutional:       General: He is not in acute distress.     Appearance: Normal appearance. He is well-developed. He is not diaphoretic.   HENT:      Head: Normocephalic and atraumatic.      Right Ear: Tympanic membrane, ear canal and external ear normal.      Left Ear: Tympanic membrane, ear canal and external ear normal.      Nose: No congestion or rhinorrhea.      Mouth/Throat:      Mouth: Mucous membranes are moist.      Pharynx: Oropharynx is clear.   Eyes:      Extraocular " Movements: Extraocular movements intact.      Pupils: Pupils are equal, round, and reactive to light.   Neck:      Thyroid: No thyromegaly.      Vascular: No JVD.      Trachea: No tracheal deviation.   Cardiovascular:      Rate and Rhythm: Normal rate and regular rhythm.      Heart sounds: Normal heart sounds. No murmur heard.     No friction rub. No gallop.   Pulmonary:      Effort: Pulmonary effort is normal. No respiratory distress.      Breath sounds: Normal breath sounds. No stridor. No wheezing or rales.   Chest:      Chest wall: No tenderness.   Abdominal:      General: Bowel sounds are normal. There is no distension.      Palpations: Abdomen is soft. There is no mass.      Tenderness: There is no abdominal tenderness. There is no guarding or rebound.   Musculoskeletal:         General: Normal range of motion.      Cervical back: Normal range of motion and neck supple.   Lymphadenopathy:      Cervical: No cervical adenopathy.   Skin:     General: Skin is warm and dry.      Coloration: Skin is not pale.      Findings: No erythema or rash.   Neurological:      Mental Status: He is alert and oriented to person, place, and time.      Cranial Nerves: No cranial nerve deficit.      Motor: No abnormal muscle tone.      Coordination: Coordination normal.      Deep Tendon Reflexes: Reflexes are normal and symmetric. Reflexes normal.       Administrative Statements   I have spent a total time of 20 minutes in caring for this patient on the day of the visit/encounter including Diagnostic results, Prognosis, Risks and benefits of tx options, Instructions for management, Patient and family education, Importance of tx compliance, Risk factor reductions, Impressions, Counseling / Coordination of care, Documenting in the medical record, Reviewing / ordering tests, medicine, procedures  , and Obtaining or reviewing history  .

## 2024-10-10 NOTE — ASSESSMENT & PLAN NOTE
The patient still has a mildly elevated TSH but normal T4 free consistent with subclinical hypothyroidism.  We will continue to monitor.  He does not require treatment at this time.  We will see him back as scheduled.

## 2024-10-10 NOTE — ASSESSMENT & PLAN NOTE
The patient's blood pressure is stable on his current medication.  We have made no changes today.  The patient will continue with his healthy diet and exercise.  We will see him back as scheduled.

## 2024-10-23 ENCOUNTER — HOSPITAL ENCOUNTER (OUTPATIENT)
Dept: HOSPITAL 99 - CATH | Age: 64
Discharge: HOME | End: 2024-10-23
Payer: COMMERCIAL

## 2024-10-23 DIAGNOSIS — Z09: Primary | ICD-10-CM

## 2024-10-23 DIAGNOSIS — Z86.73: ICD-10-CM

## 2024-10-23 PROCEDURE — C1764 EVENT RECORDER, CARDIAC: HCPCS

## 2024-10-23 PROCEDURE — 33285 INSJ SUBQ CAR RHYTHM MNTR: CPT

## 2024-11-11 DIAGNOSIS — F20.9 CHRONIC SCHIZOPHRENIA (HCC): ICD-10-CM

## 2024-11-11 NOTE — TELEPHONE ENCOUNTER
Reason for call:   [x] Refill   [] Prior Auth  [] Other:     Office:   [x] PCP/Provider -  Cynthia Giraldo DO   [] Specialty/Provider -     Medication:  gabapentin (NEURONTIN) 600 MG tablet    Dose/Frequency:  Take 2 tablets QAM and 1 tablet QPM     Quantity: 90    Pharmacy:   [place] [id number] [address]        Does the patient have enough for 3 days?   [x] Yes   [] No - Send as HP to POD    Reason for call:   [x] Refill   [] Prior Auth  [] Other:     Office:   [x] PCP/Provider -  Cynthia Giraldo DO   [] Specialty/Provider -     Medication: divalproex sodium (DEPAKOTE ER) 250 mg 24 hr tablet     Dose/Frequency: Take 1 tablet (250 mg total) by mouth every other day,     Quantity: 45    Pharmacy:   [facility] [address] [place] [address] [address]        Does the patient have enough for 3 days?   [] Yes   [] No - Send as HP to POD

## 2024-11-12 RX ORDER — GABAPENTIN 600 MG/1
TABLET ORAL
Qty: 90 TABLET | Refills: 1 | Status: SHIPPED | OUTPATIENT
Start: 2024-11-12

## 2024-11-12 RX ORDER — DIVALPROEX SODIUM 250 MG/1
250 TABLET, FILM COATED, EXTENDED RELEASE ORAL EVERY OTHER DAY
Qty: 45 TABLET | Refills: 1 | Status: SHIPPED | OUTPATIENT
Start: 2024-11-12

## 2024-11-25 DIAGNOSIS — F20.9 CHRONIC SCHIZOPHRENIA (HCC): ICD-10-CM

## 2024-11-25 NOTE — TELEPHONE ENCOUNTER
Reason for call:   [x] Refill   [] Prior Auth  [] Other:     Office:   [x] PCP/Provider -  Cynthia Giraldo, DO   [] Specialty/Provider -     Medication:  OLANZapine (ZyPREXA) 10 mg tablet    Dose/Frequency:  Take 1 tablet (10 mg total) by mouth daily     Quantity: 90    Pharmacy: [facility] - [place], [place] - [facility] [address]      Does the patient have enough for 3 days?   [x] Yes   [] No - Send as HP to POD

## 2024-11-26 RX ORDER — OLANZAPINE 10 MG/1
10 TABLET ORAL DAILY
Qty: 90 TABLET | Refills: 1 | Status: SHIPPED | OUTPATIENT
Start: 2024-11-26

## 2024-12-16 DIAGNOSIS — F20.9 CHRONIC SCHIZOPHRENIA (HCC): ICD-10-CM

## 2024-12-16 RX ORDER — GABAPENTIN 600 MG/1
TABLET ORAL
Qty: 270 TABLET | Refills: 1 | Status: SHIPPED | OUTPATIENT
Start: 2024-12-16

## 2024-12-16 NOTE — TELEPHONE ENCOUNTER
Patient is requesting a 90 day supply if possible. Quantity #270    Reason for call:   [x] Refill   [] Prior Auth  [] Other:     Office:   [x] PCP/Provider - Cynthia Giraldo,    [] Specialty/Provider -     Medication: gabapentin (NEURONTIN) 600 MG tablet     Dose/Frequency:  Take 2 tablets QAM and 1 tablet QPM     Quantity: 90    Pharmacy: [place] [id number] [address]      Does the patient have enough for 3 days?   [] Yes   [x] No - Send as HP to POD

## 2025-02-10 NOTE — TELEPHONE ENCOUNTER
Patient called  He was concerned about his thyroid level and it being elevated  He wanted to discuss this  [phone]  Thank you  97.3

## 2025-02-13 DIAGNOSIS — F20.9 CHRONIC SCHIZOPHRENIA (HCC): ICD-10-CM

## 2025-02-13 RX ORDER — DIVALPROEX SODIUM 500 MG/1
500 TABLET, FILM COATED, EXTENDED RELEASE ORAL 2 TIMES DAILY
Qty: 200 TABLET | Refills: 1 | Status: SHIPPED | OUTPATIENT
Start: 2025-02-13

## 2025-02-13 NOTE — TELEPHONE ENCOUNTER
Reason for call:   [x] Refill   [] Prior Auth  [] Other:     Office:   [x] PCP/Provider -   [] Specialty/Provider -     Medication: divalproex sodium (DEPAKOTE ER) 500 mg     Dose/Frequency: Take 1 tablet (500 mg total) by mouth 2 (two) times a day     Quantity: 200    Pharmacy: [facility] [address] [place] [address] - [facility] [address]     Does the patient have enough for 3 days?   [x] Yes   [] No - Send as HP to POD

## 2025-03-17 DIAGNOSIS — F20.9 CHRONIC SCHIZOPHRENIA (HCC): ICD-10-CM

## 2025-03-17 RX ORDER — OLANZAPINE 20 MG/1
20 TABLET ORAL DAILY
Qty: 90 TABLET | Refills: 0 | Status: SHIPPED | OUTPATIENT
Start: 2025-03-17

## 2025-03-17 NOTE — TELEPHONE ENCOUNTER
Reason for call:   [x] Refill   [] Prior Auth  [] Other:     Office:   [x] PCP/Provider -   [] Specialty/Provider -     Medication: Olanzapine     Dose/Frequency: 20 mg tablet taken by mouth once daily. Once daily with a 10 mg tab     Quantity: 90    Pharmacy: [place] [address] [address] [phone]     Local Pharmacy   Does the patient have enough for 3 days?   [] Yes   [x] No - Send as HP to POD    Mail Away Pharmacy   Does the patient have enough for 10 days?   [] Yes   [] No - Send as HP to POD

## 2025-03-27 ENCOUNTER — RA CDI HCC (OUTPATIENT)
Dept: OTHER | Facility: HOSPITAL | Age: 65
End: 2025-03-27

## 2025-04-02 ENCOUNTER — OFFICE VISIT (OUTPATIENT)
Dept: FAMILY MEDICINE CLINIC | Facility: CLINIC | Age: 65
End: 2025-04-02
Payer: COMMERCIAL

## 2025-04-02 VITALS
WEIGHT: 215 LBS | HEIGHT: 69 IN | TEMPERATURE: 97.7 F | BODY MASS INDEX: 31.84 KG/M2 | OXYGEN SATURATION: 98 % | DIASTOLIC BLOOD PRESSURE: 78 MMHG | HEART RATE: 79 BPM | RESPIRATION RATE: 16 BRPM | SYSTOLIC BLOOD PRESSURE: 128 MMHG

## 2025-04-02 DIAGNOSIS — Z00.00 MEDICARE ANNUAL WELLNESS VISIT, SUBSEQUENT: Primary | ICD-10-CM

## 2025-04-02 DIAGNOSIS — E78.2 MIXED HYPERLIPIDEMIA: ICD-10-CM

## 2025-04-02 DIAGNOSIS — I48.0 PAROXYSMAL ATRIAL FIBRILLATION (HCC): ICD-10-CM

## 2025-04-02 DIAGNOSIS — F20.9 CHRONIC SCHIZOPHRENIA (HCC): ICD-10-CM

## 2025-04-02 DIAGNOSIS — I10 PRIMARY HYPERTENSION: ICD-10-CM

## 2025-04-02 PROBLEM — I50.89 OTHER HEART FAILURE (HCC): Status: RESOLVED | Noted: 2024-06-20 | Resolved: 2025-04-02

## 2025-04-02 PROCEDURE — 99214 OFFICE O/P EST MOD 30 MIN: CPT | Performed by: FAMILY MEDICINE

## 2025-04-02 PROCEDURE — G0439 PPPS, SUBSEQ VISIT: HCPCS | Performed by: FAMILY MEDICINE

## 2025-04-02 NOTE — ASSESSMENT & PLAN NOTE
The patient does have a Linq monitor in place and he will continue to follow-up with cardiology as scheduled.  He will continue with his current medications.  Orders:  •  Comprehensive metabolic panel; Future  •  CBC and differential; Future  •  Lipid panel; Future

## 2025-04-02 NOTE — ASSESSMENT & PLAN NOTE
The patient's blood pressure stable on his current medication.  We have made no changes today.  Orders:  •  Comprehensive metabolic panel; Future  •  CBC and differential; Future  •  Lipid panel; Future

## 2025-04-02 NOTE — ASSESSMENT & PLAN NOTE
The patient is stable on his current medications.  We have made no changes today.  He will go for the up-to-date Depakote level as ordered.  Orders:  •  Valproic acid level, total; Future

## 2025-04-02 NOTE — PATIENT INSTRUCTIONS
Medicare Preventive Visit Patient Instructions  Thank you for completing your Welcome to Medicare Visit or Medicare Annual Wellness Visit today. Your next wellness visit will be due in one year (4/3/2026).  The screening/preventive services that you may require over the next 5-10 years are detailed below. Some tests may not apply to you based off risk factors and/or age. Screening tests ordered at today's visit but not completed yet may show as past due. Also, please note that scanned in results may not display below.  Preventive Screenings:  Service Recommendations Previous Testing/Comments   Colorectal Cancer Screening  Colonoscopy    Fecal Occult Blood Test (FOBT)/Fecal Immunochemical Test (FIT)  Fecal DNA/Cologuard Test  Flexible Sigmoidoscopy Age: 45-75 years old   Colonoscopy: every 10 years (May be performed more frequently if at higher risk)  OR  FOBT/FIT: every 1 year  OR  Cologuard: every 3 years  OR  Sigmoidoscopy: every 5 years  Screening may be recommended earlier than age 45 if at higher risk for colorectal cancer. Also, an individualized decision between you and your healthcare provider will decide whether screening between the ages of 76-85 would be appropriate. Colonoscopy: 06/07/2022  FOBT/FIT: Not on file  Cologuard: Not on file  Sigmoidoscopy: Not on file    Screening Current     Prostate Cancer Screening Individualized decision between patient and health care provider in men between ages of 55-69   Medicare will cover every 12 months beginning on the day after your 50th birthday PSA: 1.68 ng/mL           Hepatitis C Screening Once for adults born between 1945 and 1965  More frequently in patients at high risk for Hepatitis C Hep C Antibody: 02/27/2020    Screening Current   Diabetes Screening 1-2 times per year if you're at risk for diabetes or have pre-diabetes Fasting glucose: 94 mg/dL (6/21/2024)  A1C: 5.5 % (6/7/2024)  Screening Current   Cholesterol Screening Once every 5 years if you don't  have a lipid disorder. May order more often based on risk factors. Lipid panel: 09/16/2024  Screening Not Indicated  History Lipid Disorder      Other Preventive Screenings Covered by Medicare:  Abdominal Aortic Aneurysm (AAA) Screening: covered once if your at risk. You're considered to be at risk if you have a family history of AAA or a male between the age of 65-75 who smoking at least 100 cigarettes in your lifetime.  Lung Cancer Screening: covers low dose CT scan once per year if you meet all of the following conditions: (1) Age 55-77; (2) No signs or symptoms of lung cancer; (3) Current smoker or have quit smoking within the last 15 years; (4) You have a tobacco smoking history of at least 20 pack years (packs per day x number of years you smoked); (5) You get a written order from a healthcare provider.  Glaucoma Screening: covered annually if you're considered high risk: (1) You have diabetes OR (2) Family history of glaucoma OR (3)  aged 50 and older OR (4)  American aged 65 and older  Osteoporosis Screening: covered every 2 years if you meet one of the following conditions: (1) Have a vertebral abnormality; (2) On glucocorticoid therapy for more than 3 months; (3) Have primary hyperparathyroidism; (4) On osteoporosis medications and need to assess response to drug therapy.  HIV Screening: covered annually if you're between the age of 15-65. Also covered annually if you are younger than 15 and older than 65 with risk factors for HIV infection. For pregnant patients, it is covered up to 3 times per pregnancy.    Immunizations:  Immunization Recommendations   Influenza Vaccine Annual influenza vaccination during flu season is recommended for all persons aged >= 6 months who do not have contraindications   Pneumococcal Vaccine   * Pneumococcal conjugate vaccine = PCV13 (Prevnar 13), PCV15 (Vaxneuvance), PCV20 (Prevnar 20)  * Pneumococcal polysaccharide vaccine = PPSV23 (Pneumovax) Adults  19-63 yo with certain risk factors or if 65+ yo  If never received any pneumonia vaccine: recommend Prevnar 20 (PCV20)  Give PCV20 if previously received 1 dose of PCV13 or PPSV23   Hepatitis B Vaccine 3 dose series if at intermediate or high risk (ex: diabetes, end stage renal disease, liver disease)   Respiratory syncytial virus (RSV) Vaccine - COVERED BY MEDICARE PART D  * RSVPreF3 (Arexvy) CDC recommends that adults 60 years of age and older may receive a single dose of RSV vaccine using shared clinical decision-making (SCDM)   Tetanus (Td) Vaccine - COST NOT COVERED BY MEDICARE PART B Following completion of primary series, a booster dose should be given every 10 years to maintain immunity against tetanus. Td may also be given as tetanus wound prophylaxis.   Tdap Vaccine - COST NOT COVERED BY MEDICARE PART B Recommended at least once for all adults. For pregnant patients, recommended with each pregnancy.   Shingles Vaccine (Shingrix) - COST NOT COVERED BY MEDICARE PART B  2 shot series recommended in those 19 years and older who have or will have weakened immune systems or those 50 years and older     Health Maintenance Due:      Topic Date Due   • Colorectal Cancer Screening  06/06/2025   • HIV Screening  Completed   • Hepatitis C Screening  Completed     Immunizations Due:      Topic Date Due   • COVID-19 Vaccine (4 - 2024-25 season) 09/01/2024     Advance Directives   What are advance directives?  Advance directives are legal documents that state your wishes and plans for medical care. These plans are made ahead of time in case you lose your ability to make decisions for yourself. Advance directives can apply to any medical decision, such as the treatments you want, and if you want to donate organs.   What are the types of advance directives?  There are many types of advance directives, and each state has rules about how to use them. You may choose a combination of any of the following:  Living will:  This  is a written record of the treatment you want. You can also choose which treatments you do not want, which to limit, and which to stop at a certain time. This includes surgery, medicine, IV fluid, and tube feedings.   Durable power of  for healthcare (DPAHC):  This is a written record that states who you want to make healthcare choices for you when you are unable to make them for yourself. This person, called a proxy, is usually a family member or a friend. You may choose more than 1 proxy.  Do not resuscitate (DNR) order:  A DNR order is used in case your heart stops beating or you stop breathing. It is a request not to have certain forms of treatment, such as CPR. A DNR order may be included in other types of advance directives.  Medical directive:  This covers the care that you want if you are in a coma, near death, or unable to make decisions for yourself. You can list the treatments you want for each condition. Treatment may include pain medicine, surgery, blood transfusions, dialysis, IV or tube feedings, and a ventilator (breathing machine).  Values history:  This document has questions about your views, beliefs, and how you feel and think about life. This information can help others choose the care that you would choose.  Why are advance directives important?  An advance directive helps you control your care. Although spoken wishes may be used, it is better to have your wishes written down. Spoken wishes can be misunderstood, or not followed. Treatments may be given even if you do not want them. An advance directive may make it easier for your family to make difficult choices about your care.   Fall Prevention    Fall prevention  includes ways to make your home and other areas safer. It also includes ways you can move more carefully to prevent a fall. Health conditions that cause changes in your blood pressure, vision, or muscle strength and coordination may increase your risk for falls. Medicines  may also increase your risk for falls if they make you dizzy, weak, or sleepy.   Fall prevention tips:   Stand or sit up slowly.    Use assistive devices as directed.    Wear shoes that fit well and have soles that .    Wear a personal alarm.    Stay active.    Manage your medical conditions.    Home Safety Tips:  Add items to prevent falls in the bathroom.    Keep paths clear.    Install bright lights in your home.    Keep items you use often on shelves within reach.    Paint or place reflective tape on the edges of your stairs.    Weight Management   Why it is important to manage your weight:  Being overweight increases your risk of health conditions such as heart disease, high blood pressure, type 2 diabetes, and certain types of cancer. It can also increase your risk for osteoarthritis, sleep apnea, and other respiratory problems. Aim for a slow, steady weight loss. Even a small amount of weight loss can lower your risk of health problems.  How to lose weight safely:  A safe and healthy way to lose weight is to eat fewer calories and get regular exercise. You can lose up about 1 pound a week by decreasing the number of calories you eat by 500 calories each day.   Healthy meal plan for weight management:  A healthy meal plan includes a variety of foods, contains fewer calories, and helps you stay healthy. A healthy meal plan includes the following:  Eat whole-grain foods more often.  A healthy meal plan should contain fiber. Fiber is the part of grains, fruits, and vegetables that is not broken down by your body. Whole-grain foods are healthy and provide extra fiber in your diet. Some examples of whole-grain foods are whole-wheat breads and pastas, oatmeal, brown rice, and bulgur.  Eat a variety of vegetables every day.  Include dark, leafy greens such as spinach, kale, yessi greens, and mustard greens. Eat yellow and orange vegetables such as carrots, sweet potatoes, and winter squash.   Eat a variety of  fruits every day.  Choose fresh or canned fruit (canned in its own juice or light syrup) instead of juice. Fruit juice has very little or no fiber.  Eat low-fat dairy foods.  Drink fat-free (skim) milk or 1% milk. Eat fat-free yogurt and low-fat cottage cheese. Try low-fat cheeses such as mozzarella and other reduced-fat cheeses.  Choose meat and other protein foods that are low in fat.  Choose beans or other legumes such as split peas or lentils. Choose fish, skinless poultry (chicken or turkey), or lean cuts of red meat (beef or pork). Before you cook meat or poultry, cut off any visible fat.   Use less fat and oil.  Try baking foods instead of frying them. Add less fat, such as margarine, sour cream, regular salad dressing and mayonnaise to foods. Eat fewer high-fat foods. Some examples of high-fat foods include french fries, doughnuts, ice cream, and cakes.  Eat fewer sweets.  Limit foods and drinks that are high in sugar. This includes candy, cookies, regular soda, and sweetened drinks.  Exercise:  Exercise at least 30 minutes per day on most days of the week. Some examples of exercise include walking, biking, dancing, and swimming. You can also fit in more physical activity by taking the stairs instead of the elevator or parking farther away from stores. Ask your healthcare provider about the best exercise plan for you.      © Copyright [employer] 2018 Information is for End User's use only and may not be sold, redistributed or otherwise used for commercial purposes. All illustrations and images included in CareNotes® are the copyrighted property of [employer].D.A[employer]M., Inc. or [employer]

## 2025-04-02 NOTE — ASSESSMENT & PLAN NOTE
The patient will continue on the atorvastatin as ordered.  He will go for the follow-up lipid panel as indicated and we will follow-up with results.  We will see him back as scheduled.    Orders:  •  Comprehensive metabolic panel; Future  •  CBC and differential; Future  •  Lipid panel; Future

## 2025-04-02 NOTE — PROGRESS NOTES
Name: Zeke Colunga      : 1960      MRN: [id number]  Encounter Provider: Cynthia Giraldo DO  Encounter Date: 2025   Encounter department: [facility]    Assessment & Plan  Medicare annual wellness visit, subsequent  The patient had an unremarkable exam in the office today.  He is doing very well overall and has no new complaints.  He will go for the up-to-date lab work as ordered and we will follow-up closely with the results when available.  We will see him back as scheduled.       Chronic schizophrenia (HCC)  The patient is stable on his current medications.  We have made no changes today.  He will go for the up-to-date Depakote level as ordered.  Orders:  •  Valproic acid level, total; Future    Paroxysmal atrial fibrillation (HCC)  The patient does have a Linq monitor in place and he will continue to follow-up with cardiology as scheduled.  He will continue with his current medications.  Orders:  •  Comprehensive metabolic panel; Future  •  CBC and differential; Future  •  Lipid panel; Future    Primary hypertension  The patient's blood pressure stable on his current medication.  We have made no changes today.  Orders:  •  Comprehensive metabolic panel; Future  •  CBC and differential; Future  •  Lipid panel; Future    Mixed hyperlipidemia  The patient will continue on the atorvastatin as ordered.  He will go for the follow-up lipid panel as indicated and we will follow-up with results.  We will see him back as scheduled.    Orders:  •  Comprehensive metabolic panel; Future  •  CBC and differential; Future  •  Lipid panel; Future      Depression Screening and Follow-up Plan: Patient was screened for depression during today's encounter. They screened negative with a PHQ-2 score of 0.        Preventive health issues were discussed with patient, and age appropriate screening tests were ordered as noted in patient's After Visit Summary. Personalized health advice and  appropriate referrals for health education or preventive services given if needed, as noted in patient's After Visit Summary.      Chief Complaint   Patient presents with   • Medicare Wellness Visit     subsequent       History of Present Illness     Zeke Colunga is a 64 y.o. male who presents today for Subsequent Medicare wellness visit.  The patient is feeling well and there are no sicknesses,  He sees the Cardiologist recently.  He had a Linq monitor placed in the fall- which he has in place.  The patient denies any chest pain, shortness of breath, or palpitations.  There is no ALLEN, PND, or orthopnea.  There is no edema.  There are no headaches or visual changes.  There is no lightheadedness, dizziness, or fainting spells.  The patient currently denies any nausea, vomiting, or GERD symptoms.  he has normal bowel movements and normal urine output.  he has a normal appetite.  There are no GI issues.    He is still looking for a new psychiatrist- stable on his meds.    He has been walking 1.5 miles daily.    He is going to be due for a colonoscopy in June and will schedule.        Hypertension  This is a chronic problem. The current episode started more than 1 year ago. The problem has been waxing and waning since onset. The problem is controlled. Pertinent negatives include no anxiety, blurred vision, chest pain, headaches, malaise/fatigue, neck pain, orthopnea, palpitations, peripheral edema, PND, shortness of breath or sweats.      Patient Care Team:  Cynthia Giraldo DO as PCP - General (Family Medicine)  Kitty Nicholas MD (Cardiology)    Review of Systems   Constitutional: Negative.  Negative for malaise/fatigue.   HENT: Negative.     Eyes: Negative.  Negative for blurred vision.   Respiratory: Negative.  Negative for shortness of breath.    Cardiovascular: Negative.  Negative for chest pain, palpitations, orthopnea and PND.   Gastrointestinal: Negative.    Endocrine: Negative.    Genitourinary:  Negative.    Musculoskeletal: Negative.  Negative for neck pain.   Skin: Negative.    Allergic/Immunologic: Negative.    Neurological: Negative.  Negative for headaches.   Hematological: Negative.    Psychiatric/Behavioral: Negative.       Medical History Reviewed by provider this encounter:  Tobacco  Allergies  Meds  Problems  Med Hx  Surg Hx  Fam Hx       Annual Wellness Visit Questionnaire   Zeke is here for his Subsequent Wellness visit. Last Medicare Wellness visit information reviewed, patient interviewed and updates made to the record today.      Health Risk Assessment:   Patient rates overall health as good. Patient feels that their physical health rating is much better. Patient is very satisfied with their life. Eyesight was rated as slightly better. Hearing was rated as slightly better. Patient feels that their emotional and mental health rating is slightly better. Patients states they are never, rarely angry. Patient states they are sometimes unusually tired/fatigued. Pain experienced in the last 7 days has been some. Patient's pain rating has been 7/10. Patient states that he has experienced no weight loss or gain in last 6 months. Left heel bruised from shoes patient wore Saturday ,pain level 7- he hurt his left foot from the dress shoes he was wearing- it is sore.      Depression Screening:   PHQ-2 Score: 0      Fall Risk Screening:   In the past year, patient has experienced: history of falling in past year    Number of falls: 1  Injured during fall?: No    Feels unsteady when standing or walking?: Yes    Worried about falling?: No      Home Safety:  Patient does not have trouble with stairs inside or outside of their home. Patient has working smoke alarms and has working carbon monoxide detector. Home safety hazards include: not having non-slip bath and/or shower mats.     Nutrition:   Current diet is Regular. Low salt    Medications:   Patient is not currently taking any over-the-counter  supplements. Patient is able to manage medications.     Activities of Daily Living (ADLs)/Instrumental Activities of Daily Living (IADLs):   Walk and transfer into and out of bed and chair?: Yes  Dress and groom yourself?: Yes    Bathe or shower yourself?: Yes    Feed yourself? Yes  Do your laundry/housekeeping?: Yes  Manage your money, pay your bills and track your expenses?: Yes  Make your own meals?: Yes    Do your own shopping?: Yes    Previous Hospitalizations:   Any hospitalizations or ED visits within the last 12 months?: Yes      Hospitalization Comments:  mini stroke in  [facility] 3 days fell out of bed  on the way to the bathroom    Advance Care Planning:   Living will: Yes    Durable POA for healthcare: Yes    Advanced directive: Yes    Advanced directive counseling given: Yes    Five wishes given: No    Patient declined ACP directive: No    End of Life Decisions reviewed with patient: Yes    Provider agrees with end of life decisions: Yes      PREVENTIVE SCREENINGS      Cardiovascular Screening:    General: Screening Not Indicated, History Lipid Disorder, Risks and Benefits Discussed and Screening Current      Diabetes Screening:     General: Screening Current and Risks and Benefits Discussed      Colorectal Cancer Screening:     General: Screening Current      Prostate Cancer Screening:    General: Risks and Benefits Discussed    Due for: PSA      Osteoporosis Screening:    General: Screening Not Indicated      Abdominal Aortic Aneurysm (AAA) Screening:    Risk factors include: age between 65-74 yo        General: Screening Not Indicated      Lung Cancer Screening:     General: Screening Not Indicated      Hepatitis C Screening:    General: Screening Current    Screening, Brief Intervention, and Referral to Treatment (SBIRT)     Screening  Typical number of drinks in a day: 0  Typical number of drinks in a week: 0  Interpretation: Low risk drinking behavior.    AUDIT-C Screenin) How often did you  have a drink containing alcohol in the past year? never  2) How many drinks did you have on a typical day when you were drinking in the past year? 0  3) How often did you have 6 or more drinks on one occasion in the past year? never    AUDIT-C Score: 0  Interpretation: Score 0-3 (male): Negative screen for alcohol misuse    Single Item Drug Screening:  How often have you used an illegal drug (including marijuana) or a prescription medication for non-medical reasons in the past year? never    Single Item Drug Screen Score: 0  Interpretation: Negative screen for possible drug use disorder    Brief Intervention  Alcohol & drug use screenings were reviewed. No concerns regarding substance use disorder identified.     Other Counseling Topics:   Car/seat belt/driving safety, skin self-exam, sunscreen and calcium and vitamin D intake and regular weightbearing exercise.     Social Drivers of Health     Financial Resource Strain: Low Risk  (6/7/2024)    Received from [facility]    Overall Financial Resource Strain (CARDIA)    • Difficulty of Paying Living Expenses: Not hard at all   Food Insecurity: No Food Insecurity (4/2/2025)    Hunger Vital Sign    • Worried About Running Out of Food in the Last Year: Never true    • Ran Out of Food in the Last Year: Never true   Transportation Needs: No Transportation Needs (4/2/2025)    PRAPARE - Transportation    • Lack of Transportation (Medical): No    • Lack of Transportation (Non-Medical): No   Housing Stability: Low Risk  (4/2/2025)    Housing Stability Vital Sign    • Unable to Pay for Housing in the Last Year: No    • Number of Times Moved in the Last Year: 0    • Homeless in the Last Year: No   Utilities: Not At Risk (4/2/2025)    [facility] Utilities    • Threatened with loss of utilities: No     Vision Screening    Right eye Left eye Both eyes   Without correction   20/30   With correction      Comments: He sees the eye doctor and will schedule.      Objective  "  /78 (BP Location: Left arm, Patient Position: Sitting, Cuff Size: Large)   Pulse 79   Temp 97.7 °F (36.5 °C) (Tympanic)   Resp 16   Ht 5' 9\" (1.753 m)   Wt 97.5 kg (215 lb)   SpO2 98%   BMI 31.75 kg/m²     Physical Exam  Vitals and nursing note reviewed.   Constitutional:       General: He is not in acute distress.     Appearance: Normal appearance. He is well-developed. He is not diaphoretic.   HENT:      Head: Normocephalic and atraumatic.      Right Ear: Tympanic membrane, ear canal and external ear normal.      Left Ear: Tympanic membrane, ear canal and external ear normal.      Nose: No congestion or rhinorrhea.      Mouth/Throat:      Mouth: Mucous membranes are moist.      Pharynx: Oropharynx is clear.   Eyes:      Extraocular Movements: Extraocular movements intact.      Pupils: Pupils are equal, round, and reactive to light.   Neck:      Thyroid: No thyromegaly.      Vascular: No JVD.      Trachea: No tracheal deviation.   Cardiovascular:      Rate and Rhythm: Normal rate and regular rhythm.      Heart sounds: Normal heart sounds. No murmur heard.     No friction rub. No gallop.   Pulmonary:      Effort: Pulmonary effort is normal. No respiratory distress.      Breath sounds: Normal breath sounds. No stridor. No wheezing or rales.   Chest:      Chest wall: No tenderness.   Abdominal:      General: Bowel sounds are normal. There is no distension.      Palpations: Abdomen is soft. There is no mass.      Tenderness: There is no abdominal tenderness. There is no guarding or rebound.   Musculoskeletal:         General: Normal range of motion.      Cervical back: Normal range of motion and neck supple.   Lymphadenopathy:      Cervical: No cervical adenopathy.   Skin:     General: Skin is warm and dry.      Coloration: Skin is not pale.      Findings: No erythema or rash.   Neurological:      Mental Status: He is alert and oriented to person, place, and time.      Cranial Nerves: No cranial nerve " deficit.      Motor: No abnormal muscle tone.      Coordination: Coordination normal.      Deep Tendon Reflexes: Reflexes are normal and symmetric. Reflexes normal.       Administrative Statements   I have spent a total time of 40 minutes in caring for this patient on the day of the visit/encounter including Prognosis, Risks and benefits of tx options, Risk factor reductions, Impressions, Counseling / Coordination of care, Documenting in the medical record, Reviewing/placing orders in the medical record (including tests, medications, and/or procedures), and Obtaining or reviewing history  .

## 2025-04-24 ENCOUNTER — APPOINTMENT (OUTPATIENT)
Dept: LAB | Facility: CLINIC | Age: 65
End: 2025-04-24
Attending: FAMILY MEDICINE
Payer: COMMERCIAL

## 2025-04-24 DIAGNOSIS — F20.9 CHRONIC SCHIZOPHRENIA (HCC): ICD-10-CM

## 2025-04-24 DIAGNOSIS — E78.2 MIXED HYPERLIPIDEMIA: ICD-10-CM

## 2025-04-24 DIAGNOSIS — I10 PRIMARY HYPERTENSION: ICD-10-CM

## 2025-04-24 DIAGNOSIS — I48.0 PAROXYSMAL ATRIAL FIBRILLATION (HCC): ICD-10-CM

## 2025-04-24 LAB
ALBUMIN SERPL BCG-MCNC: 4.4 G/DL (ref 3.5–5)
ALP SERPL-CCNC: 70 U/L (ref 34–104)
ALT SERPL W P-5'-P-CCNC: 19 U/L (ref 7–52)
ANION GAP SERPL CALCULATED.3IONS-SCNC: 11 MMOL/L (ref 4–13)
AST SERPL W P-5'-P-CCNC: 23 U/L (ref 13–39)
BASOPHILS # BLD AUTO: 0.08 THOUSANDS/ÂΜL (ref 0–0.1)
BASOPHILS NFR BLD AUTO: 1 % (ref 0–1)
BILIRUB SERPL-MCNC: 0.59 MG/DL (ref 0.2–1)
BUN SERPL-MCNC: 13 MG/DL (ref 5–25)
CALCIUM SERPL-MCNC: 9.4 MG/DL (ref 8.4–10.2)
CHLORIDE SERPL-SCNC: 102 MMOL/L (ref 96–108)
CHOLEST SERPL-MCNC: 94 MG/DL (ref ?–200)
CO2 SERPL-SCNC: 29 MMOL/L (ref 21–32)
CREAT SERPL-MCNC: 1.05 MG/DL (ref 0.6–1.3)
EOSINOPHIL # BLD AUTO: 0.22 THOUSAND/ÂΜL (ref 0–0.61)
EOSINOPHIL NFR BLD AUTO: 3 % (ref 0–6)
ERYTHROCYTE [DISTWIDTH] IN BLOOD BY AUTOMATED COUNT: 12.9 % (ref 11.6–15.1)
GFR SERPL CREATININE-BSD FRML MDRD: 74 ML/MIN/1.73SQ M
GLUCOSE P FAST SERPL-MCNC: 84 MG/DL (ref 65–99)
HCT VFR BLD AUTO: 43.2 % (ref 36.5–49.3)
HDLC SERPL-MCNC: 41 MG/DL
HGB BLD-MCNC: 14.1 G/DL (ref 12–17)
IMM GRANULOCYTES # BLD AUTO: 0.04 THOUSAND/UL (ref 0–0.2)
IMM GRANULOCYTES NFR BLD AUTO: 1 % (ref 0–2)
LDLC SERPL CALC-MCNC: 31 MG/DL (ref 0–100)
LYMPHOCYTES # BLD AUTO: 1.95 THOUSANDS/ÂΜL (ref 0.6–4.47)
LYMPHOCYTES NFR BLD AUTO: 27 % (ref 14–44)
MCH RBC QN AUTO: 29.4 PG (ref 26.8–34.3)
MCHC RBC AUTO-ENTMCNC: 32.6 G/DL (ref 31.4–37.4)
MCV RBC AUTO: 90 FL (ref 82–98)
MONOCYTES # BLD AUTO: 0.76 THOUSAND/ÂΜL (ref 0.17–1.22)
MONOCYTES NFR BLD AUTO: 11 % (ref 4–12)
NEUTROPHILS # BLD AUTO: 4.13 THOUSANDS/ÂΜL (ref 1.85–7.62)
NEUTS SEG NFR BLD AUTO: 57 % (ref 43–75)
NONHDLC SERPL-MCNC: 53 MG/DL
NRBC BLD AUTO-RTO: 0 /100 WBCS
PLATELET # BLD AUTO: 256 THOUSANDS/UL (ref 149–390)
PMV BLD AUTO: 9.9 FL (ref 8.9–12.7)
POTASSIUM SERPL-SCNC: 4.6 MMOL/L (ref 3.5–5.3)
PROT SERPL-MCNC: 7.6 G/DL (ref 6.4–8.4)
RBC # BLD AUTO: 4.79 MILLION/UL (ref 3.88–5.62)
SODIUM SERPL-SCNC: 142 MMOL/L (ref 135–147)
TRIGL SERPL-MCNC: 110 MG/DL (ref ?–150)
VALPROATE SERPL-MCNC: 113 UG/ML (ref 50–125)
WBC # BLD AUTO: 7.18 THOUSAND/UL (ref 4.31–10.16)

## 2025-04-24 PROCEDURE — 36415 COLL VENOUS BLD VENIPUNCTURE: CPT

## 2025-04-24 PROCEDURE — 80053 COMPREHEN METABOLIC PANEL: CPT

## 2025-04-24 PROCEDURE — 85025 COMPLETE CBC W/AUTO DIFF WBC: CPT

## 2025-04-24 PROCEDURE — 80164 ASSAY DIPROPYLACETIC ACD TOT: CPT

## 2025-04-24 PROCEDURE — 80061 LIPID PANEL: CPT

## 2025-04-25 ENCOUNTER — RESULTS FOLLOW-UP (OUTPATIENT)
Dept: FAMILY MEDICINE CLINIC | Facility: CLINIC | Age: 65
End: 2025-04-25

## 2025-05-07 DIAGNOSIS — F20.9 CHRONIC SCHIZOPHRENIA (HCC): ICD-10-CM

## 2025-05-07 NOTE — TELEPHONE ENCOUNTER
Reason for call:   [x] Refill   [] Prior Auth  [] Other:     Office:   [x] PCP/Provider -   [] Specialty/Provider -     Medication: divalproex sodium (DEPAKOTE ER) 250 mg     Dose/Frequency: Take 1 tablet (250 mg total) by mouth every other day     Quantity: 45    Pharmacy: [facility] [address] [place] [address]   Does the patient have enough for 3 days?   [x] Yes   [] No - Send as HP to POD    [facility]   Does the patient have enough for 10 days?   [] Yes   [] No - Send as HP to POD

## 2025-05-08 RX ORDER — DIVALPROEX SODIUM 250 MG/1
250 TABLET, FILM COATED, EXTENDED RELEASE ORAL EVERY OTHER DAY
Qty: 45 TABLET | Refills: 1 | Status: SHIPPED | OUTPATIENT
Start: 2025-05-08

## 2025-05-19 DIAGNOSIS — F20.9 CHRONIC SCHIZOPHRENIA (HCC): ICD-10-CM

## 2025-05-19 RX ORDER — OLANZAPINE 10 MG/1
10 TABLET, FILM COATED ORAL DAILY
Qty: 90 TABLET | Refills: 1 | Status: SHIPPED | OUTPATIENT
Start: 2025-05-19

## 2025-05-19 NOTE — TELEPHONE ENCOUNTER
Reason for call:   [x] Refill-patient is currently out of town but will be back for 1 day before leaving again on the 22nd. Requesting HP refill   [] Prior Auth  [] Other:     Office:   [x] PCP/Provider - LEANN CARTER  Authorized By: Cynthia Giraldo DO  [] Specialty/Provider -     Medication: OLANZapine (ZyPREXA) 10 mg tablet     Dose/Frequency: Take 1 tablet (10 mg total) by mouth daily     Quantity: 90    Pharmacy: [address] [facility]   Does the patient have enough for 3 days?   [] Yes   [x] No - Send as HP to POD    [facility]   Does the patient have enough for 10 days?   [] Yes   [] No - Send as HP to POD

## 2025-06-05 DIAGNOSIS — F20.9 CHRONIC SCHIZOPHRENIA (HCC): ICD-10-CM

## 2025-06-05 RX ORDER — GABAPENTIN 600 MG/1
TABLET ORAL
Qty: 270 TABLET | Refills: 1 | Status: SHIPPED | OUTPATIENT
Start: 2025-06-05

## 2025-06-05 NOTE — TELEPHONE ENCOUNTER
Reason for call:   [x] Refill   [] Prior Auth  [] Other:     Office:   [x] PCP/Provider - Cynthia Giraldo   [] Specialty/Provider -     Medication: gabapentin (NEURONTIN) 600 MG tablet     Dose/Frequency: Take 2 tablets QAM and 1 tablet QPM     Quantity: 270    Pharmacy: [facility]   Does the patient have enough for 3 days?   [] Yes   [x] No - Send as HP to POD

## 2025-06-19 DIAGNOSIS — F20.9 CHRONIC SCHIZOPHRENIA (HCC): ICD-10-CM

## 2025-06-19 RX ORDER — OLANZAPINE 20 MG/1
20 TABLET, FILM COATED ORAL DAILY
Qty: 90 TABLET | Refills: 0 | Status: SHIPPED | OUTPATIENT
Start: 2025-06-19

## 2025-06-19 NOTE — TELEPHONE ENCOUNTER
Reason for call:   [x] Refill   [] Prior Auth  [] Other:     Office:   [x] PCP/Provider -   [] Specialty/Provider -     Medication: Olanzapine    Dose/Frequency: 20 mg    Quantity: 90 tablets    Pharmacy:   [place] [address] [address] [phone]     Local Pharmacy   Does the patient have enough for 3 days?   [] Yes   [x] No - Send as HP to POD    [facility]   Does the patient have enough for 10 days?   [] Yes   [] No - Send as HP to POD